# Patient Record
Sex: MALE | Race: WHITE | Employment: PART TIME | URBAN - NONMETROPOLITAN AREA
[De-identification: names, ages, dates, MRNs, and addresses within clinical notes are randomized per-mention and may not be internally consistent; named-entity substitution may affect disease eponyms.]

---

## 2017-06-28 ENCOUNTER — OFFICE VISIT (OUTPATIENT)
Dept: FAMILY MEDICINE CLINIC | Age: 52
End: 2017-06-28

## 2017-06-28 VITALS
BODY MASS INDEX: 37.76 KG/M2 | HEART RATE: 80 BPM | RESPIRATION RATE: 12 BRPM | HEIGHT: 67 IN | WEIGHT: 240.6 LBS | DIASTOLIC BLOOD PRESSURE: 81 MMHG | TEMPERATURE: 98.4 F | SYSTOLIC BLOOD PRESSURE: 127 MMHG

## 2017-06-28 DIAGNOSIS — F31.62 BIPOLAR 1 DISORDER, MIXED, MODERATE (HCC): Primary | ICD-10-CM

## 2017-06-28 DIAGNOSIS — I10 ESSENTIAL HYPERTENSION: ICD-10-CM

## 2017-06-28 DIAGNOSIS — E55.9 VITAMIN D DEFICIENCY: ICD-10-CM

## 2017-06-28 DIAGNOSIS — G89.29 CHRONIC PAIN OF RIGHT KNEE: ICD-10-CM

## 2017-06-28 DIAGNOSIS — M25.561 CHRONIC PAIN OF RIGHT KNEE: ICD-10-CM

## 2017-06-28 PROCEDURE — 99204 OFFICE O/P NEW MOD 45 MIN: CPT | Performed by: FAMILY MEDICINE

## 2017-06-28 RX ORDER — ZOLPIDEM TARTRATE 10 MG/1
TABLET ORAL NIGHTLY PRN
COMMUNITY
End: 2018-08-31 | Stop reason: SDUPTHER

## 2017-06-28 RX ORDER — RISPERIDONE 2 MG/1
2 TABLET, FILM COATED ORAL EVERY EVENING
COMMUNITY
End: 2018-04-04 | Stop reason: SDUPTHER

## 2017-06-28 ASSESSMENT — ENCOUNTER SYMPTOMS
DIARRHEA: 0
VOMITING: 0
NAUSEA: 0
SHORTNESS OF BREATH: 0
BACK PAIN: 1
COUGH: 0
BLOOD IN STOOL: 0
HEARTBURN: 0
DOUBLE VISION: 0
EYE PAIN: 0
EYE REDNESS: 0
HEMOPTYSIS: 0
ORTHOPNEA: 0
EYE DISCHARGE: 0
BLURRED VISION: 0
CONSTIPATION: 0
WHEEZING: 0
SORE THROAT: 0

## 2017-06-28 ASSESSMENT — PATIENT HEALTH QUESTIONNAIRE - PHQ9
2. FEELING DOWN, DEPRESSED OR HOPELESS: 0
1. LITTLE INTEREST OR PLEASURE IN DOING THINGS: 0
SUM OF ALL RESPONSES TO PHQ QUESTIONS 1-9: 0
SUM OF ALL RESPONSES TO PHQ9 QUESTIONS 1 & 2: 0

## 2017-07-19 ENCOUNTER — TELEPHONE (OUTPATIENT)
Dept: FAMILY MEDICINE CLINIC | Age: 52
End: 2017-07-19

## 2017-07-19 DIAGNOSIS — Z12.11 COLON CANCER SCREENING: Primary | ICD-10-CM

## 2017-07-20 ENCOUNTER — TELEPHONE (OUTPATIENT)
Dept: FAMILY MEDICINE CLINIC | Age: 52
End: 2017-07-20

## 2017-08-10 ENCOUNTER — HOSPITAL ENCOUNTER (OUTPATIENT)
Age: 52
Discharge: HOME | End: 2017-08-10
Payer: MEDICARE

## 2017-08-10 ENCOUNTER — TELEPHONE (OUTPATIENT)
Dept: FAMILY MEDICINE CLINIC | Age: 52
End: 2017-08-10

## 2017-08-10 LAB
HCT VFR BLD CALC: 42 % (ref 42–52)
HEMOGLOBIN: 13.9 GM/DL (ref 14–18)
MCH RBC QN AUTO: 30.5 PG (ref 27–31)
MCHC RBC AUTO-ENTMCNC: 33 GM/DL (ref 33–37)
MCV RBC AUTO: 92.7 FL (ref 80–94)
PDW BLD-RTO: 12.9 % (ref 11.5–14.5)
PLATELET # BLD: 199 THOU/MM3 (ref 130–400)
PMV BLD AUTO: 10.3 MCM (ref 7.4–10.4)
RBC # BLD: 4.54 MILL/MM3 (ref 4.7–6.1)
WBC # BLD: 10.3 THOU/MM3 (ref 4.8–10.8)

## 2017-08-10 PROCEDURE — 85027 COMPLETE CBC AUTOMATED: CPT

## 2017-08-10 PROCEDURE — 36415 COLL VENOUS BLD VENIPUNCTURE: CPT

## 2017-08-22 ENCOUNTER — TELEPHONE (OUTPATIENT)
Dept: FAMILY MEDICINE CLINIC | Age: 52
End: 2017-08-22

## 2017-09-27 RX ORDER — LISINOPRIL 20 MG/1
20 TABLET ORAL DAILY
Qty: 30 TABLET | Refills: 5 | Status: SHIPPED | OUTPATIENT
Start: 2017-09-27 | End: 2018-04-04 | Stop reason: SDUPTHER

## 2017-10-31 ENCOUNTER — OFFICE VISIT (OUTPATIENT)
Dept: FAMILY MEDICINE CLINIC | Age: 52
End: 2017-10-31
Payer: MEDICARE

## 2017-10-31 VITALS
HEIGHT: 67 IN | WEIGHT: 251 LBS | RESPIRATION RATE: 14 BRPM | DIASTOLIC BLOOD PRESSURE: 74 MMHG | TEMPERATURE: 97.7 F | BODY MASS INDEX: 39.39 KG/M2 | HEART RATE: 88 BPM | SYSTOLIC BLOOD PRESSURE: 128 MMHG

## 2017-10-31 DIAGNOSIS — I10 ESSENTIAL HYPERTENSION: ICD-10-CM

## 2017-10-31 DIAGNOSIS — G89.29 CHRONIC PAIN OF RIGHT KNEE: ICD-10-CM

## 2017-10-31 DIAGNOSIS — F31.62 BIPOLAR 1 DISORDER, MIXED, MODERATE (HCC): Primary | ICD-10-CM

## 2017-10-31 DIAGNOSIS — M25.561 CHRONIC PAIN OF RIGHT KNEE: ICD-10-CM

## 2017-10-31 PROCEDURE — 99214 OFFICE O/P EST MOD 30 MIN: CPT | Performed by: FAMILY MEDICINE

## 2017-10-31 RX ORDER — ACETAMINOPHEN 325 MG/1
650 TABLET ORAL EVERY 6 HOURS PRN
Status: ON HOLD | COMMUNITY
End: 2021-01-12 | Stop reason: HOSPADM

## 2017-10-31 NOTE — PROGRESS NOTES
Dionne Painter is a 47 y.o. male that presents for Other (referral  to psych ) and Discuss Medications (mobic  only takes sometimes )        HPI:    Would like new referral to psychiatry. Previously saw Dr Felix Homans. Reports that he is compliant with his current regimen. Bipolar Disorder    HPI:  Mood has been doing ok per patient. Increased Distractibility? No  Indiscretion behaviors (gambling, sexual activity)? No  Grandiosity? No  Flight of ideas? No  Increased goal directed activity? No   Decreased need for sleep? No  Talkativeness or pressured speech? Yes     Substance abuse - No   Suicidal/Homicidal Ideation? No    Compliant with meds: Yes  Med side effects: No     Sees therapist?:  Yes    States that the mobic seems to be helping. He is alternating this with ASA and Tylenol. HTN    Does patient check BP regularly at home? - No  Current Medication regimen - Lisinopril  Tolerating medications well? - yes    Shortness of breath or chest pain? No  Headache or visual complaints? No  Neurologic changes like confusion? No  Extremity edema? No    BP Readings from Last 3 Encounters:   10/31/17 128/74   06/28/17 127/81   08/09/15 123/68         Health Maintenance   Topic Date Due    Hepatitis C screen  1965    HIV screen  05/17/1979    DTaP/Tdap/Td vaccine (1 - Tdap) 05/17/1983    Lipid screen  05/17/2004    Diabetes screen  05/17/2004    Flu vaccine (1) 09/01/2017    Colon cancer screen colonoscopy  10/13/2027       Past Medical History:   Diagnosis Date    Arthritis     Bilateral posterior subcapsular polar cataract     Hypertension        Past Surgical History:   Procedure Laterality Date    HERNIA REPAIR      SALIVARY GLAND SURGERY Left 2000    TOE SURGERY         Social History   Substance Use Topics    Smoking status: Former Smoker     Quit date: 6/28/2014    Smokeless tobacco: Never Used    Alcohol use No       History reviewed. No pertinent family history.       I have reviewed prescribed medications. Barriers to medication compliance addressed. Patient given educational materials - see patient instructions. All patient questions answered. Patient voiced understanding.

## 2017-10-31 NOTE — PROGRESS NOTES
Have you changed or started any medications since your last visit including any over-the-counter medicines, vitamins, or herbal medicines? no   Are you having any side effects from any of your medications? -  no  Have you stopped taking any of your medications? Is so, why? -  no    Have you seen any other physician or provider since your last visit? No  Have you had any other diagnostic tests since your last visit? No  Have you been seen in the emergency room and/or had an admission to a hospital since we last saw you? No  Have you had your routine dental cleaning in the past 6 months? no    Have you activated your [employer] account? If not, what are your barriers? No: declined      Patient Care Team:  Nova Joyce, DO as PCP - General (Family Medicine)    Medical History Review  Past Medical, Family, and Social History     Defer to provider.

## 2018-01-16 DIAGNOSIS — F31.62 BIPOLAR 1 DISORDER, MIXED, MODERATE (HCC): Primary | ICD-10-CM

## 2018-01-16 RX ORDER — DIVALPROEX SODIUM 500 MG/1
500 TABLET, EXTENDED RELEASE ORAL 2 TIMES DAILY
Qty: 30 TABLET | OUTPATIENT
Start: 2018-01-16

## 2018-01-16 NOTE — TELEPHONE ENCOUNTER
Pt requesting a refill on Depakote, pt states he only has 7 days of medication left and he doesn't see Dr Nell Borges his new dr until 2/18/18. Pharmacy verified.

## 2018-01-22 RX ORDER — DIVALPROEX SODIUM 500 MG/1
500 TABLET, EXTENDED RELEASE ORAL 2 TIMES DAILY
Qty: 60 TABLET | Refills: 1 | Status: SHIPPED | OUTPATIENT
Start: 2018-01-22 | End: 2018-02-19

## 2018-02-19 ENCOUNTER — OFFICE VISIT (OUTPATIENT)
Dept: PSYCHIATRY | Age: 53
End: 2018-02-19
Payer: MEDICAID

## 2018-02-19 VITALS — WEIGHT: 245 LBS | BODY MASS INDEX: 38.09 KG/M2

## 2018-02-19 DIAGNOSIS — F29 PSYCHOSIS, UNSPECIFIED PSYCHOSIS TYPE (HCC): Primary | ICD-10-CM

## 2018-02-19 PROCEDURE — 90792 PSYCH DIAG EVAL W/MED SRVCS: CPT | Performed by: PSYCHIATRY & NEUROLOGY

## 2018-02-19 RX ORDER — DIVALPROEX SODIUM 500 MG/1
500 TABLET, EXTENDED RELEASE ORAL NIGHTLY
Qty: 30 TABLET | Refills: 3 | Status: SHIPPED | OUTPATIENT
Start: 2018-02-19 | End: 2018-04-04

## 2018-03-09 NOTE — TELEPHONE ENCOUNTER
Jie Vazquez called requesting a refill on the following medications:  Requested Prescriptions     Pending Prescriptions Disp Refills    meloxicam (MOBIC) 7.5 MG tablet 30 tablet      Sig: Take 2 tablets by mouth daily 1/2 to 1 daily as needed     Pharmacy verified:  [facility]      Date of last visit: 10/31/17  Date of next visit (if applicable): 8/28/0133

## 2018-03-10 RX ORDER — MELOXICAM 7.5 MG/1
15 TABLET ORAL DAILY
Qty: 30 TABLET | Refills: 5 | Status: SHIPPED | OUTPATIENT
Start: 2018-03-10 | End: 2018-03-12

## 2018-03-12 ENCOUNTER — OFFICE VISIT (OUTPATIENT)
Dept: FAMILY MEDICINE CLINIC | Age: 53
End: 2018-03-12
Payer: MEDICAID

## 2018-03-12 ENCOUNTER — HOSPITAL ENCOUNTER (OUTPATIENT)
Dept: GENERAL RADIOLOGY | Age: 53
Discharge: HOME | End: 2018-03-12
Payer: MEDICAID

## 2018-03-12 ENCOUNTER — HOSPITAL ENCOUNTER (OUTPATIENT)
Age: 53
Discharge: HOME | End: 2018-03-12
Payer: MEDICAID

## 2018-03-12 VITALS
TEMPERATURE: 98.3 F | BODY MASS INDEX: 38.61 KG/M2 | DIASTOLIC BLOOD PRESSURE: 84 MMHG | HEIGHT: 67 IN | HEART RATE: 84 BPM | WEIGHT: 246 LBS | SYSTOLIC BLOOD PRESSURE: 132 MMHG | RESPIRATION RATE: 12 BRPM

## 2018-03-12 DIAGNOSIS — M25.511 ACUTE PAIN OF RIGHT SHOULDER: ICD-10-CM

## 2018-03-12 DIAGNOSIS — M25.561 CHRONIC PAIN OF RIGHT KNEE: ICD-10-CM

## 2018-03-12 DIAGNOSIS — M25.511 ACUTE PAIN OF RIGHT SHOULDER: Primary | ICD-10-CM

## 2018-03-12 DIAGNOSIS — G89.29 CHRONIC PAIN OF RIGHT KNEE: ICD-10-CM

## 2018-03-12 PROCEDURE — 99214 OFFICE O/P EST MOD 30 MIN: CPT | Performed by: FAMILY MEDICINE

## 2018-03-12 PROCEDURE — 73030 X-RAY EXAM OF SHOULDER: CPT

## 2018-03-12 NOTE — PROGRESS NOTES
Opal Rivers is a 47 y.o. male that presents for Follow-up (R knee pain, pt states that sxs are worse since last visit, sharp pains when walking/weight bearing ) and Shoulder Injury (R shoulder pain x 1 month, injured during fall, constant achey pain with occassional sharpness, pain waking him in the middle of the night )        HPI:    Right Shoulder Pain    HPI:    Symptoms have been present for 1 month(s) in the right shoulder. The pain is constant. The patient describes the pain as aching. Inciting injury or history of trauma? Yes - patient fell backwards on an outstretched arm, pain started immediately  Pain is relieved by - unknown  Pain is aggravated by - laying on that side  Radiation of the pain? No  Paresthesias of the extremities? No  Decreased ROM? No  Treatments tried - mobic    Notes that mobic does not seem to be helping as much for his right knee pain. Health Maintenance   Topic Date Due    Potassium monitoring  1964    Creatinine monitoring  1964    Hepatitis C screen  1965    HIV screen  05/17/1979    DTaP/Tdap/Td vaccine (1 - Tdap) 05/17/1983    Lipid screen  05/17/2004    Diabetes screen  05/17/2004    Shingles Vaccine (1 of 2 - 2 Dose Series) 05/17/2014    Colon cancer screen colonoscopy  10/13/2027    Flu vaccine  Completed       Past Medical History:   Diagnosis Date    Arthritis     Bilateral posterior subcapsular polar cataract     Hypertension        Past Surgical History:   Procedure Laterality Date    HERNIA REPAIR      SALIVARY GLAND SURGERY Left 2000    TOE SURGERY         Social History   Substance Use Topics    Smoking status: Former Smoker     Quit date: 6/28/2014    Smokeless tobacco: Never Used    Alcohol use No       No family history on file.       I have reviewed the patient's past medical history, past surgical history, allergies, medications, social and family history and I have made updates where appropriate. ROS        PHYSICAL EXAM:  /84   Pulse 84   Temp 98.3 °F (36.8 °C) (Oral)   Resp 12   Ht 5' 7.24\" (1.708 m)   Wt 246 lb (111.6 kg)   BMI 38.25 kg/m²     General Appearance: well developed and well- nourished, in no acute distress  Head: normocephalic and atraumatic  ENT: external ear and ear canal normal bilaterally, nose without deformity, nasal mucosa and turbinates normal without polyps, oropharynx normal, dentition is normal for age, no lip or gum lesions noted  Neck: supple and non-tender without mass, no thyromegaly or thyroid nodules, no cervical lymphadenopathy  Pulmonary/Chest: clear to auscultation bilaterally- no wheezes, rales or rhonchi, normal air movement, no respiratory distress or retractions  Cardiovascular: normal rate, regular rhythm, normal S1 and S2, no murmurs, rubs, clicks, or gallops, distal pulses intact  Abdomen: soft, non-tender, non-distended, no rebound or guarding, no masses or hernias noted, no hepatospleenomegaly  Extremities: no cyanosis, clubbing or edema of the lower extremities  Psych:  Normal affect without evidence of depression or anxiety, insight and judgement are appropriate, memory appears intact  Skin: warm and dry, no rash or erythema    5/5 strength in the UEs  ROM is normal bilaterally. Palpitation of the clavicle, AC joint and shoulder joint revealed AC joint tenderness  The patient has a positive standard lift off test.  Neer's test is negative  Empty Can test is negative  Felix Test is negative        ASSESSMENT & PLAN  Estevan Luo was seen today for follow-up and shoulder injury. Diagnoses and all orders for this visit:    Acute pain of right shoulder  -     Ambulatory referral to Occupational Therapy  -     XR SHOULDER RIGHT (MIN 2 VIEWS); Future    Chronic pain of right knee  -     diclofenac (VOLTAREN) 50 MG EC tablet; Take 1 tablet by mouth 3 times daily as needed for Pain    -RC tendonitis vs Labrum injury as cause of shoulder pain. Will start pt. If sx persist, will refer to ortho  -Will stop mobic and try voltaren for knee and shoulder pain.  -Patient advised to call immediately or go to ER if any worsening of symptoms      Return if symptoms worsen or fail to improve. Controlled Substances Monitoring:                     Ashlie Arias received counseling on the following healthy behaviors: medication adherence  Reviewed prior labs and health maintenance. Continue current medications, diet and exercise. Discussed use, benefit, and side effects of prescribed medications. Barriers to medication compliance addressed. Patient given educational materials - see patient instructions. All patient questions answered. Patient voiced understanding.

## 2018-03-13 ENCOUNTER — TELEPHONE (OUTPATIENT)
Dept: FAMILY MEDICINE CLINIC | Age: 53
End: 2018-03-13

## 2018-04-04 ENCOUNTER — OFFICE VISIT (OUTPATIENT)
Dept: PSYCHIATRY | Age: 53
End: 2018-04-04
Payer: MEDICAID

## 2018-04-04 DIAGNOSIS — F29 PSYCHOSIS, UNSPECIFIED PSYCHOSIS TYPE (HCC): Primary | ICD-10-CM

## 2018-04-04 PROCEDURE — G8417 CALC BMI ABV UP PARAM F/U: HCPCS | Performed by: PSYCHIATRY & NEUROLOGY

## 2018-04-04 PROCEDURE — 99213 OFFICE O/P EST LOW 20 MIN: CPT | Performed by: PSYCHIATRY & NEUROLOGY

## 2018-04-04 PROCEDURE — G8428 CUR MEDS NOT DOCUMENT: HCPCS | Performed by: PSYCHIATRY & NEUROLOGY

## 2018-04-04 PROCEDURE — 1036F TOBACCO NON-USER: CPT | Performed by: PSYCHIATRY & NEUROLOGY

## 2018-04-04 PROCEDURE — 3017F COLORECTAL CA SCREEN DOC REV: CPT | Performed by: PSYCHIATRY & NEUROLOGY

## 2018-04-04 RX ORDER — LAMOTRIGINE 25 MG/1
TABLET ORAL
Qty: 42 TABLET | Refills: 0 | Status: SHIPPED | OUTPATIENT
Start: 2018-04-04 | End: 2018-10-25

## 2018-04-04 RX ORDER — RISPERIDONE 2 MG/1
2 TABLET, FILM COATED ORAL NIGHTLY
Qty: 30 TABLET | Refills: 3 | Status: SHIPPED | OUTPATIENT
Start: 2018-04-04 | End: 2018-07-25 | Stop reason: SDUPTHER

## 2018-04-04 RX ORDER — LISINOPRIL 20 MG/1
20 TABLET ORAL DAILY
Qty: 30 TABLET | Refills: 5 | Status: SHIPPED | OUTPATIENT
Start: 2018-04-04 | End: 2018-08-02 | Stop reason: SDUPTHER

## 2018-04-24 ENCOUNTER — TELEPHONE (OUTPATIENT)
Dept: FAMILY MEDICINE CLINIC | Age: 53
End: 2018-04-24

## 2018-05-04 ENCOUNTER — OFFICE VISIT (OUTPATIENT)
Dept: PSYCHIATRY | Age: 53
End: 2018-05-04
Payer: MEDICAID

## 2018-05-04 DIAGNOSIS — F29 PSYCHOSIS, UNSPECIFIED PSYCHOSIS TYPE (HCC): Primary | ICD-10-CM

## 2018-05-04 PROCEDURE — 3017F COLORECTAL CA SCREEN DOC REV: CPT | Performed by: PSYCHIATRY & NEUROLOGY

## 2018-05-04 PROCEDURE — 1036F TOBACCO NON-USER: CPT | Performed by: PSYCHIATRY & NEUROLOGY

## 2018-05-04 PROCEDURE — G8428 CUR MEDS NOT DOCUMENT: HCPCS | Performed by: PSYCHIATRY & NEUROLOGY

## 2018-05-04 PROCEDURE — G8417 CALC BMI ABV UP PARAM F/U: HCPCS | Performed by: PSYCHIATRY & NEUROLOGY

## 2018-05-04 PROCEDURE — 99213 OFFICE O/P EST LOW 20 MIN: CPT | Performed by: PSYCHIATRY & NEUROLOGY

## 2018-05-04 RX ORDER — LAMOTRIGINE 200 MG/1
TABLET ORAL
Qty: 30 TABLET | Refills: 5 | Status: SHIPPED | OUTPATIENT
Start: 2018-05-04 | End: 2018-10-25 | Stop reason: SDUPTHER

## 2018-05-09 ENCOUNTER — TELEPHONE (OUTPATIENT)
Dept: FAMILY MEDICINE CLINIC | Age: 53
End: 2018-05-09

## 2018-05-14 ENCOUNTER — TELEPHONE (OUTPATIENT)
Dept: FAMILY MEDICINE CLINIC | Age: 53
End: 2018-05-14

## 2018-05-14 ENCOUNTER — HOSPITAL ENCOUNTER (OUTPATIENT)
Age: 53
Discharge: HOME | End: 2018-05-14
Payer: MEDICAID

## 2018-05-14 DIAGNOSIS — I10 ESSENTIAL HYPERTENSION: ICD-10-CM

## 2018-05-14 DIAGNOSIS — E55.9 VITAMIN D DEFICIENCY: ICD-10-CM

## 2018-05-14 DIAGNOSIS — E55.9 VITAMIN D DEFICIENCY: Primary | ICD-10-CM

## 2018-05-14 LAB
ANION GAP SERPL CALCULATED.3IONS-SCNC: 15 MEQ/L (ref 8–16)
BUN BLDV-MCNC: 11 MG/DL (ref 7–22)
CALCIUM SERPL-MCNC: 9.6 MG/DL (ref 8.5–10.5)
CHLORIDE BLD-SCNC: 100 MEQ/L (ref 98–111)
CHOLESTEROL, TOTAL: 205 MG/DL (ref 100–199)
CO2: 25 MEQ/L (ref 23–33)
CREAT SERPL-MCNC: 0.7 MG/DL (ref 0.4–1.2)
GFR SERPL CREATININE-BSD FRML MDRD: > 90 ML/MIN/1.73M2
GLUCOSE BLD-MCNC: 100 MG/DL (ref 70–108)
HDLC SERPL-MCNC: 36 MG/DL
LDL CHOLESTEROL CALCULATED: 124 MG/DL
POTASSIUM SERPL-SCNC: 4.5 MEQ/L (ref 3.5–5.2)
SODIUM BLD-SCNC: 140 MEQ/L (ref 135–145)
TRIGL SERPL-MCNC: 227 MG/DL (ref 0–199)
VITAMIN D 25-HYDROXY: 27 NG/ML (ref 30–100)

## 2018-05-14 PROCEDURE — 82306 VITAMIN D 25 HYDROXY: CPT

## 2018-05-14 PROCEDURE — 80048 BASIC METABOLIC PNL TOTAL CA: CPT

## 2018-05-14 PROCEDURE — 36415 COLL VENOUS BLD VENIPUNCTURE: CPT

## 2018-05-14 PROCEDURE — 80061 LIPID PANEL: CPT

## 2018-05-24 ENCOUNTER — TELEPHONE (OUTPATIENT)
Dept: PSYCHIATRY | Age: 53
End: 2018-05-24

## 2018-06-06 ENCOUNTER — OFFICE VISIT (OUTPATIENT)
Dept: PSYCHIATRY | Age: 53
End: 2018-06-06
Payer: MEDICAID

## 2018-06-06 DIAGNOSIS — F29 PSYCHOSIS, UNSPECIFIED PSYCHOSIS TYPE (HCC): Primary | ICD-10-CM

## 2018-06-06 PROCEDURE — G8417 CALC BMI ABV UP PARAM F/U: HCPCS | Performed by: PSYCHIATRY & NEUROLOGY

## 2018-06-06 PROCEDURE — G8428 CUR MEDS NOT DOCUMENT: HCPCS | Performed by: PSYCHIATRY & NEUROLOGY

## 2018-06-06 PROCEDURE — 3017F COLORECTAL CA SCREEN DOC REV: CPT | Performed by: PSYCHIATRY & NEUROLOGY

## 2018-06-06 PROCEDURE — 1036F TOBACCO NON-USER: CPT | Performed by: PSYCHIATRY & NEUROLOGY

## 2018-06-06 PROCEDURE — 99212 OFFICE O/P EST SF 10 MIN: CPT | Performed by: PSYCHIATRY & NEUROLOGY

## 2018-07-17 ENCOUNTER — HOSPITAL ENCOUNTER (OUTPATIENT)
Age: 53
Discharge: HOME | End: 2018-07-17
Payer: MEDICAID

## 2018-07-17 DIAGNOSIS — E55.9 VITAMIN D DEFICIENCY: ICD-10-CM

## 2018-07-17 LAB — VITAMIN D 25-HYDROXY: 58 NG/ML (ref 30–100)

## 2018-07-17 PROCEDURE — 82306 VITAMIN D 25 HYDROXY: CPT

## 2018-07-17 PROCEDURE — 36415 COLL VENOUS BLD VENIPUNCTURE: CPT

## 2018-07-18 ENCOUNTER — TELEPHONE (OUTPATIENT)
Dept: FAMILY MEDICINE CLINIC | Age: 53
End: 2018-07-18

## 2018-07-18 NOTE — TELEPHONE ENCOUNTER
----- Message from Cam Felton DO sent at 7/17/2018  6:55 PM EDT -----  Vitamin D level is good. Recommend he take a supplement with 1200 Units of vitamin D per day.

## 2018-07-25 ENCOUNTER — OFFICE VISIT (OUTPATIENT)
Dept: PSYCHIATRY | Age: 53
End: 2018-07-25
Payer: MEDICAID

## 2018-07-25 DIAGNOSIS — F29 PSYCHOSIS, UNSPECIFIED PSYCHOSIS TYPE (HCC): Primary | ICD-10-CM

## 2018-07-25 PROCEDURE — 99212 OFFICE O/P EST SF 10 MIN: CPT | Performed by: PSYCHIATRY & NEUROLOGY

## 2018-07-25 PROCEDURE — G8428 CUR MEDS NOT DOCUMENT: HCPCS | Performed by: PSYCHIATRY & NEUROLOGY

## 2018-07-25 PROCEDURE — 3017F COLORECTAL CA SCREEN DOC REV: CPT | Performed by: PSYCHIATRY & NEUROLOGY

## 2018-07-25 PROCEDURE — 1036F TOBACCO NON-USER: CPT | Performed by: PSYCHIATRY & NEUROLOGY

## 2018-07-25 PROCEDURE — G8417 CALC BMI ABV UP PARAM F/U: HCPCS | Performed by: PSYCHIATRY & NEUROLOGY

## 2018-07-25 RX ORDER — RISPERIDONE 2 MG/1
2 TABLET, FILM COATED ORAL NIGHTLY
Qty: 30 TABLET | Refills: 5 | Status: SHIPPED | OUTPATIENT
Start: 2018-07-25 | End: 2018-10-25 | Stop reason: SDUPTHER

## 2018-07-25 NOTE — PROGRESS NOTES
Chief Complaint   Patient presents with    Follow-up     6 wks psychosis r/o bipolar     And returned after six weeks to follow-up on psychosis, rule out bipolar disorder. Once again, he denies any and all mood symptoms area did his mother was bipolar and he has had some distortions like paranoia and hallucinations, but he really has not ever and mood symptoms that I can find. That leaves me in a diagnostic quandary. I think I'm going to have to continue to diagnose an unspecified psychosis which is in remission. He did need a refill of the risperidone 2 mg. He takes one at bedtime daily. This was provided. He still has ample refills of lamotrigine. He was personable, has talked some about his experiences at work, and really is not the a functional difficulties that I can tell. Mental Status Examination    Level of consciousness:  within normal limits  Appearance:  well-appearing, street clothes, in chair, good grooming and good hygiene  Behavior/Motor:  no abnormalities noted  Attitude toward examiner:  cooperative, attentive and good eye contact  Speech:  spontaneous, normal rate, normal volume and well articulated  Mood:  euthymic  Affect:  mood congruent  Thought processes:  linear, goal directed and coherent  Thought content:  Homocidal ideation: none  Suicidal Ideation:  denies suicidal ideation  Delusions:  no evidence of delusions  Perceptual Disturbance:  denies any perceptual disturbance  Cognition:  oriented to person, place, and time  Concentration succeeded  Memory intact  Fund of knowledge:  good  Abstract thinking:  fair  Insight:  good  Judgment:  good    DIAGNOSTIC IMPRESSION  Pschosis unspecified, in remission    Plan  No changes  Current Outpatient Prescriptions   Medication Sig Dispense Refill    risperiDONE (RISPERDAL) 2 MG tablet Take 1 tablet by mouth nightly 30 tablet 5    Cholecalciferol (VITAMIN D3) 41672 units TABS Take 1 tab PO q weekly for 8 weeks.  8 tablet 0    lamoTRIgine (LAMICTAL) 200 MG tablet After completing the 25s, take 1/2 tablet daily for one week, then increase to the whole tablet daily and maintain that level 30 tablet 5    lisinopril (PRINIVIL;ZESTRIL) 20 MG tablet Take 1 tablet by mouth daily 30 tablet 5    lamoTRIgine (LAMICTAL) 25 MG tablet After two weeks off the valproate, take 1 tablet daily for two weeks, then increase to 2 tablets daily 42 tablet 0    diclofenac (VOLTAREN) 50 MG EC tablet Take 1 tablet by mouth 3 times daily as needed for Pain 60 tablet 3    acetaminophen (TYLENOL) 325 MG tablet Take 650 mg by mouth every 6 hours as needed for Pain      zolpidem (AMBIEN) 10 MG tablet Take by mouth nightly as needed for Sleep      aspirin 81 MG tablet Take 81 mg by mouth daily       No current facility-administered medications for this visit.

## 2018-08-02 ENCOUNTER — OFFICE VISIT (OUTPATIENT)
Dept: FAMILY MEDICINE CLINIC | Age: 53
End: 2018-08-02
Payer: MEDICAID

## 2018-08-02 VITALS
WEIGHT: 246 LBS | HEIGHT: 67 IN | DIASTOLIC BLOOD PRESSURE: 82 MMHG | RESPIRATION RATE: 14 BRPM | BODY MASS INDEX: 38.61 KG/M2 | HEART RATE: 88 BPM | SYSTOLIC BLOOD PRESSURE: 118 MMHG | TEMPERATURE: 99.1 F

## 2018-08-02 DIAGNOSIS — I10 ESSENTIAL HYPERTENSION: Primary | ICD-10-CM

## 2018-08-02 DIAGNOSIS — F39 MOOD DISORDER (HCC): ICD-10-CM

## 2018-08-02 DIAGNOSIS — Z11.59 NEED FOR HEPATITIS C SCREENING TEST: ICD-10-CM

## 2018-08-02 DIAGNOSIS — G89.29 CHRONIC PAIN OF RIGHT KNEE: ICD-10-CM

## 2018-08-02 DIAGNOSIS — M25.561 CHRONIC PAIN OF RIGHT KNEE: ICD-10-CM

## 2018-08-02 PROCEDURE — 3017F COLORECTAL CA SCREEN DOC REV: CPT | Performed by: FAMILY MEDICINE

## 2018-08-02 PROCEDURE — G8427 DOCREV CUR MEDS BY ELIG CLIN: HCPCS | Performed by: FAMILY MEDICINE

## 2018-08-02 PROCEDURE — 99214 OFFICE O/P EST MOD 30 MIN: CPT | Performed by: FAMILY MEDICINE

## 2018-08-02 PROCEDURE — 1036F TOBACCO NON-USER: CPT | Performed by: FAMILY MEDICINE

## 2018-08-02 PROCEDURE — G8417 CALC BMI ABV UP PARAM F/U: HCPCS | Performed by: FAMILY MEDICINE

## 2018-08-02 RX ORDER — LISINOPRIL 20 MG/1
20 TABLET ORAL DAILY
Qty: 90 TABLET | Refills: 3 | Status: SHIPPED | OUTPATIENT
Start: 2018-08-02 | End: 2019-07-22 | Stop reason: SDUPTHER

## 2018-08-02 NOTE — PROGRESS NOTES
Visit Information    Have you changed or started any medications since your last visit including any over-the-counter medicines, vitamins, or herbal medicines? no   Are you having any side effects from any of your medications? -  no  Have you stopped taking any of your medications? Is so, why? -  no    Have you seen any other physician or provider since your last visit? Yes - Records Obtained  Have you had any other diagnostic tests since your last visit? Yes - Records Obtained  Have you been seen in the emergency room and/or had an admission to a hospital since we last saw you? No  Have you had your routine dental cleaning in the past 6 months? no    Have you activated your [employer] account? If not, what are your barriers?  No: pt declined     Patient Care Team:  Kathleen Fong DO as PCP - General (Family Medicine)    Medical History Review  Past Medical, Family, and Social History reviewed and does contribute to the patient presenting condition    Health Maintenance   Topic Date Due    Hepatitis C screen  1965    HIV screen  05/17/1979    DTaP/Tdap/Td vaccine (1 - Tdap) 05/17/1983    Shingles Vaccine (1 of 2 - 2 Dose Series) 05/17/2014    Flu vaccine (1) 09/01/2018    Potassium monitoring  05/14/2019    Creatinine monitoring  05/14/2019    Lipid screen  05/14/2023    Colon cancer screen colonoscopy  10/13/2027

## 2018-08-31 ENCOUNTER — TELEPHONE (OUTPATIENT)
Dept: PSYCHIATRY | Age: 53
End: 2018-08-31

## 2018-08-31 DIAGNOSIS — F29 PSYCHOSIS, UNSPECIFIED PSYCHOSIS TYPE (HCC): Primary | ICD-10-CM

## 2018-08-31 RX ORDER — ZOLPIDEM TARTRATE 10 MG/1
10 TABLET ORAL NIGHTLY PRN
Qty: 30 TABLET | Refills: 0 | Status: SHIPPED | OUTPATIENT
Start: 2018-08-31 | End: 2018-09-30

## 2018-08-31 NOTE — TELEPHONE ENCOUNTER
Patient called asking if you start to manage his medication for sleep that Dr Alvarez Neither previously prescribed. Patient states he sometimes has trouble with sleep and last night took Ambien 10 mg however that was his last tablet from an old prescription he had from Dr Alvarez Neither.  If agreeable I will load medication for your approval. Patient was last seen 7/25/18 and scheduled to return 10/25/18

## 2018-09-11 ENCOUNTER — HOSPITAL ENCOUNTER (OUTPATIENT)
Age: 53
Discharge: HOME | End: 2018-09-11
Payer: MEDICAID

## 2018-09-11 DIAGNOSIS — Z11.59 NEED FOR HEPATITIS C SCREENING TEST: ICD-10-CM

## 2018-09-11 DIAGNOSIS — I10 ESSENTIAL HYPERTENSION: ICD-10-CM

## 2018-09-11 LAB
ANION GAP SERPL CALCULATED.3IONS-SCNC: 15 MEQ/L (ref 8–16)
BUN BLDV-MCNC: 11 MG/DL (ref 7–22)
CALCIUM SERPL-MCNC: 10.1 MG/DL (ref 8.5–10.5)
CHLORIDE BLD-SCNC: 98 MEQ/L (ref 98–111)
CO2: 25 MEQ/L (ref 23–33)
CREAT SERPL-MCNC: 0.8 MG/DL (ref 0.4–1.2)
GFR SERPL CREATININE-BSD FRML MDRD: > 90 ML/MIN/1.73M2
GLUCOSE BLD-MCNC: 82 MG/DL (ref 70–108)
HEPATITIS C ANTIBODY: NEGATIVE
POTASSIUM SERPL-SCNC: 4.5 MEQ/L (ref 3.5–5.2)
SODIUM BLD-SCNC: 138 MEQ/L (ref 135–145)

## 2018-09-11 PROCEDURE — 80048 BASIC METABOLIC PNL TOTAL CA: CPT

## 2018-09-11 PROCEDURE — 36415 COLL VENOUS BLD VENIPUNCTURE: CPT

## 2018-09-11 PROCEDURE — 86803 HEPATITIS C AB TEST: CPT

## 2018-09-12 ENCOUNTER — TELEPHONE (OUTPATIENT)
Dept: FAMILY MEDICINE CLINIC | Age: 53
End: 2018-09-12

## 2018-09-12 NOTE — TELEPHONE ENCOUNTER
----- Message from Mata Fuller DO sent at 9/11/2018  7:17 PM EDT -----  Labs look good, hep C and bmp were normal, continue current meds.

## 2018-10-25 ENCOUNTER — OFFICE VISIT (OUTPATIENT)
Dept: PSYCHIATRY | Age: 53
End: 2018-10-25
Payer: MEDICAID

## 2018-10-25 DIAGNOSIS — F29 PSYCHOSIS, UNSPECIFIED PSYCHOSIS TYPE (HCC): Primary | ICD-10-CM

## 2018-10-25 PROCEDURE — 3017F COLORECTAL CA SCREEN DOC REV: CPT | Performed by: PSYCHIATRY & NEUROLOGY

## 2018-10-25 PROCEDURE — 1036F TOBACCO NON-USER: CPT | Performed by: PSYCHIATRY & NEUROLOGY

## 2018-10-25 PROCEDURE — 99212 OFFICE O/P EST SF 10 MIN: CPT | Performed by: PSYCHIATRY & NEUROLOGY

## 2018-10-25 PROCEDURE — G8484 FLU IMMUNIZE NO ADMIN: HCPCS | Performed by: PSYCHIATRY & NEUROLOGY

## 2018-10-25 PROCEDURE — G8417 CALC BMI ABV UP PARAM F/U: HCPCS | Performed by: PSYCHIATRY & NEUROLOGY

## 2018-10-25 PROCEDURE — G8428 CUR MEDS NOT DOCUMENT: HCPCS | Performed by: PSYCHIATRY & NEUROLOGY

## 2018-10-25 RX ORDER — LAMOTRIGINE 200 MG/1
TABLET ORAL
Qty: 90 TABLET | Refills: 3 | Status: SHIPPED | OUTPATIENT
Start: 2018-10-25 | End: 2018-10-25 | Stop reason: DRUGHIGH

## 2018-10-25 RX ORDER — LAMOTRIGINE 200 MG/1
TABLET ORAL
Qty: 90 TABLET | Refills: 3 | Status: SHIPPED | OUTPATIENT
Start: 2018-10-25 | End: 2020-03-26

## 2018-10-25 RX ORDER — RISPERIDONE 2 MG/1
2 TABLET, FILM COATED ORAL NIGHTLY
Qty: 90 TABLET | Refills: 3 | Status: SHIPPED | OUTPATIENT
Start: 2018-10-25 | End: 2019-08-05 | Stop reason: ALTCHOICE

## 2018-10-25 NOTE — PROGRESS NOTES
(PRINIVIL;ZESTRIL) 20 MG tablet Take 1 tablet by mouth daily 90 tablet 3    Cholecalciferol (VITAMIN D3) 92141 units TABS Take 1 tab PO q weekly for 8 weeks. 8 tablet 0    acetaminophen (TYLENOL) 325 MG tablet Take 650 mg by mouth every 6 hours as needed for Pain      aspirin 81 MG tablet Take 81 mg by mouth daily       No current facility-administered medications for this visit.

## 2018-11-08 ENCOUNTER — TELEPHONE (OUTPATIENT)
Dept: PSYCHIATRY | Age: 53
End: 2018-11-08

## 2018-11-09 NOTE — TELEPHONE ENCOUNTER
Spoke with Urban Agarwal; he states that the rash is much better today but after thinking about it more he states that he could have gotten the rash from his 2nd vaccine from shingles. He states that his room mate had gotten an elevated temp. from it also. I informed him to monitor it and just keep us updated with any changes that might occur.

## 2019-01-31 ENCOUNTER — OFFICE VISIT (OUTPATIENT)
Dept: PSYCHIATRY | Age: 54
End: 2019-01-31
Payer: MEDICAID

## 2019-01-31 DIAGNOSIS — F29 PSYCHOSIS, UNSPECIFIED PSYCHOSIS TYPE (HCC): Primary | ICD-10-CM

## 2019-01-31 PROCEDURE — G8428 CUR MEDS NOT DOCUMENT: HCPCS | Performed by: PSYCHIATRY & NEUROLOGY

## 2019-01-31 PROCEDURE — 99212 OFFICE O/P EST SF 10 MIN: CPT | Performed by: PSYCHIATRY & NEUROLOGY

## 2019-01-31 PROCEDURE — G8417 CALC BMI ABV UP PARAM F/U: HCPCS | Performed by: PSYCHIATRY & NEUROLOGY

## 2019-01-31 PROCEDURE — 1036F TOBACCO NON-USER: CPT | Performed by: PSYCHIATRY & NEUROLOGY

## 2019-01-31 PROCEDURE — 3017F COLORECTAL CA SCREEN DOC REV: CPT | Performed by: PSYCHIATRY & NEUROLOGY

## 2019-01-31 PROCEDURE — G8484 FLU IMMUNIZE NO ADMIN: HCPCS | Performed by: PSYCHIATRY & NEUROLOGY

## 2019-04-03 ENCOUNTER — OFFICE VISIT (OUTPATIENT)
Dept: FAMILY MEDICINE CLINIC | Age: 54
End: 2019-04-03
Payer: MEDICAID

## 2019-04-03 VITALS
WEIGHT: 246 LBS | DIASTOLIC BLOOD PRESSURE: 76 MMHG | SYSTOLIC BLOOD PRESSURE: 122 MMHG | HEART RATE: 80 BPM | RESPIRATION RATE: 14 BRPM | BODY MASS INDEX: 37.28 KG/M2 | TEMPERATURE: 98.8 F | HEIGHT: 68 IN

## 2019-04-03 DIAGNOSIS — G89.29 CHRONIC PAIN OF RIGHT KNEE: ICD-10-CM

## 2019-04-03 DIAGNOSIS — B35.6 TINEA OF GROIN: Primary | ICD-10-CM

## 2019-04-03 DIAGNOSIS — M25.561 CHRONIC PAIN OF RIGHT KNEE: ICD-10-CM

## 2019-04-03 PROCEDURE — 3017F COLORECTAL CA SCREEN DOC REV: CPT | Performed by: FAMILY MEDICINE

## 2019-04-03 PROCEDURE — G8417 CALC BMI ABV UP PARAM F/U: HCPCS | Performed by: FAMILY MEDICINE

## 2019-04-03 PROCEDURE — G8427 DOCREV CUR MEDS BY ELIG CLIN: HCPCS | Performed by: FAMILY MEDICINE

## 2019-04-03 PROCEDURE — 99213 OFFICE O/P EST LOW 20 MIN: CPT | Performed by: FAMILY MEDICINE

## 2019-04-03 PROCEDURE — 1036F TOBACCO NON-USER: CPT | Performed by: FAMILY MEDICINE

## 2019-04-03 RX ORDER — TERBINAFINE HYDROCHLORIDE 250 MG/1
250 TABLET ORAL DAILY
Qty: 7 TABLET | Refills: 0 | Status: SHIPPED | OUTPATIENT
Start: 2019-04-03 | End: 2019-04-10

## 2019-04-03 NOTE — PROGRESS NOTES
Eliane Singh is a 46 y.o. male that presents for     Chief Complaint   Patient presents with    Other     jock itch  for last 2 weeks  OTC not working     Discuss Medications     Diclofenec and  B/P meds        /76   Pulse 80   Temp 98.8 °F (37.1 °C) (Oral)   Resp 14   Ht 5' 8\" (1.727 m)   Wt 246 lb (111.6 kg)   BMI 37.40 kg/m²       HPI:    Rash    HPI:    Length of time Sx have been present - 2 weeks  Rash has gotten worse since initially starting  Affected areas - right thigh/scrotum  Inciting events or exposures prior to rash starting? Yes - he is walking more recently  Pruritic? Yes  Erythematous? Yes  Weeping or drainage? No  History of Urticaria? No  Fever? No  Painful? Yes - 'light burning'    Review of Systems - General ROS: negative for - chills, fever or night sweats  Respiratory ROS: negative for - shortness of breath, stridor or wheezing      Health Maintenance   Topic Date Due    HIV screen  1979    DTaP/Tdap/Td vaccine (1 - Tdap) 1983    Shingles Vaccine (2 of 2) 10/20/2018    Potassium monitoring  2019    Creatinine monitoring  2019    Lipid screen  2023    Colon cancer screen colonoscopy  10/13/2027    Flu vaccine  Completed    Hepatitis C screen  Completed       Past Medical History:   Diagnosis Date    Arthritis     Bilateral posterior subcapsular polar cataract     Hypertension        Past Surgical History:   Procedure Laterality Date    HERNIA REPAIR      SALIVARY GLAND SURGERY Left 2000    TOE SURGERY         Social History     Tobacco Use    Smoking status: Former Smoker     Last attempt to quit: 2014     Years since quittin.7    Smokeless tobacco: Never Used   Substance Use Topics    Alcohol use: No    Drug use: No       No family history on file.       I have reviewed the patient's past medical history, past surgical history, allergies, medications, social and family history and I have made updates where appropriate. Review of Systems        PHYSICAL EXAM:  /76   Pulse 80   Temp 98.8 °F (37.1 °C) (Oral)   Resp 14   Ht 5' 8\" (1.727 m)   Wt 246 lb (111.6 kg)   BMI 37.40 kg/m²     General Appearance: well developed and well- nourished, in no acute distress  Head: normocephalic and atraumatic  Extremities: no cyanosis, clubbing or edema of the lower extremities  Psych:  Normal affect without evidence of depression oranxiety, insight and judgement are appropriate, memory appears intact  Skin: warm and dry, erythematous patch of the right lateral scrotum and thigh      ASSESSMENT & PLAN  Alfonso Yuan was seen today for other and discuss medications. Diagnoses and all orders for this visit:    Tinea of groin  -     terbinafine (LAMISIL) 250 MG tablet; Take 1 tablet by mouth daily for 7 days  -     miconazole (ZEASORB-AF) 2 % powder; Apply topically 2 times daily. Chronic pain of right knee  Roselyn Ivy MD, Orthopedic Surgery, [facility] ROXANNE AKHTAR    -Patient advised to call if any worsening of symptoms      Return if symptoms worsen or fail to improve. Controlled Substances Monitoring:                     Alfonso Yuan received counseling on the following healthy behaviors: medication adherence  Reviewed prior labs and health maintenance. Continue current medications, diet and exercise. Discussed use, benefit, and side effects of prescribed medications. Barriers to medication compliance addressed. Patient given educational materials - see patient instructions. All patient questions answered. Patient voiced understanding.

## 2019-04-03 NOTE — PROGRESS NOTES
Have you changed or started any medications since your last visit including any over-the-counter medicines, vitamins, or herbal medicines? no   Are you having any side effects from any of your medications? -  no  Have you stopped taking any of your medications? Is so, why? -  no    Have you seen any other physician or provider since your last visit? No  Have you had any other diagnostic tests since your last visit? No  Have you been seen in the emergency room and/or had an admission to a hospital since we last saw you? No  Have you had your routine dental cleaning in the past 6 months? no    Have you activated your [employer] account? If not, what are your barriers? No:      Patient Care Team:  Carlos Lind DO as PCP - General (Family Medicine)    Medical History Review  Past Medical, Family, and Social History     Defer to provider.

## 2019-04-26 ENCOUNTER — TELEPHONE (OUTPATIENT)
Dept: FAMILY MEDICINE CLINIC | Age: 54
End: 2019-04-26

## 2019-04-26 DIAGNOSIS — G89.29 CHRONIC PAIN OF RIGHT KNEE: Primary | ICD-10-CM

## 2019-04-26 DIAGNOSIS — M67.80 TENDONOSIS: ICD-10-CM

## 2019-04-26 DIAGNOSIS — M25.561 CHRONIC PAIN OF RIGHT KNEE: Primary | ICD-10-CM

## 2019-04-26 NOTE — TELEPHONE ENCOUNTER
AMB EXTERNAL REFERRAL TO ORTHOPEDIC SURGERY-Left a detailed message letting patient know that we need to hear from him as to the provider name and number of where this referral is to be placed. Reminded him that he informed us [facility] is not in his insurance network, and he was going to check with his insurance. We are almost to the 30 day end of the referral.  If he wants to cancel the referral, we will need to know this as well.

## 2019-04-29 NOTE — TELEPHONE ENCOUNTER
Insurance still hasn't sent information so Rowdy Lewis went on line. Pauline in [facility]. Called and did verify that they accept his insurance. They need referral and MRI from 2017 faxed. Dr Rome Crawford will need to sign MRI before faxing.     Fax # - [phone]  # - [phone]

## 2019-04-29 NOTE — TELEPHONE ENCOUNTER
What is the referral for? I see he had an MRI of his knee in 2017, is it for this, he had recent shoulder x-rays?  Thanks

## 2019-04-30 ENCOUNTER — OFFICE VISIT (OUTPATIENT)
Dept: PSYCHIATRY | Age: 54
End: 2019-04-30
Payer: MEDICAID

## 2019-04-30 DIAGNOSIS — F29 PSYCHOSIS, UNSPECIFIED PSYCHOSIS TYPE (HCC): Primary | ICD-10-CM

## 2019-04-30 PROCEDURE — 3017F COLORECTAL CA SCREEN DOC REV: CPT | Performed by: PSYCHIATRY & NEUROLOGY

## 2019-04-30 PROCEDURE — 99213 OFFICE O/P EST LOW 20 MIN: CPT | Performed by: PSYCHIATRY & NEUROLOGY

## 2019-04-30 PROCEDURE — G8417 CALC BMI ABV UP PARAM F/U: HCPCS | Performed by: PSYCHIATRY & NEUROLOGY

## 2019-04-30 PROCEDURE — 1036F TOBACCO NON-USER: CPT | Performed by: PSYCHIATRY & NEUROLOGY

## 2019-04-30 PROCEDURE — G8428 CUR MEDS NOT DOCUMENT: HCPCS | Performed by: PSYCHIATRY & NEUROLOGY

## 2019-04-30 NOTE — PROGRESS NOTES
good    DIAGNOSTIC IMPRESSION  Psychotic disorder unspecified    Plan  Reduce risperidone to 1 mg nightly (1/2 tablet)  Current Outpatient Medications   Medication Sig Dispense Refill    miconazole (ZEASORB-AF) 2 % powder Apply topically 2 times daily. 45 g 1    risperiDONE (RISPERDAL) 2 MG tablet Take 1 tablet by mouth nightly 90 tablet 3    lamoTRIgine (LAMICTAL) 200 MG tablet Take 1 tablet by mouth daily 90 tablet 3    diclofenac (VOLTAREN) 50 MG EC tablet Take 1 tablet by mouth 3 times daily as needed for Pain 180 tablet 3    lisinopril (PRINIVIL;ZESTRIL) 20 MG tablet Take 1 tablet by mouth daily 90 tablet 3    Cholecalciferol (VITAMIN D3) 55144 units TABS Take 1 tab PO q weekly for 8 weeks. 8 tablet 0    acetaminophen (TYLENOL) 325 MG tablet Take 650 mg by mouth every 6 hours as needed for Pain      aspirin 81 MG tablet Take 81 mg by mouth daily       No current facility-administered medications for this visit.

## 2019-08-05 ENCOUNTER — OFFICE VISIT (OUTPATIENT)
Dept: FAMILY MEDICINE CLINIC | Age: 54
End: 2019-08-05
Payer: MEDICAID

## 2019-08-05 VITALS
HEART RATE: 80 BPM | TEMPERATURE: 98.6 F | WEIGHT: 250 LBS | HEIGHT: 68 IN | RESPIRATION RATE: 14 BRPM | DIASTOLIC BLOOD PRESSURE: 64 MMHG | SYSTOLIC BLOOD PRESSURE: 118 MMHG | BODY MASS INDEX: 37.89 KG/M2

## 2019-08-05 DIAGNOSIS — Z11.4 SCREENING FOR HIV (HUMAN IMMUNODEFICIENCY VIRUS): ICD-10-CM

## 2019-08-05 DIAGNOSIS — I10 ESSENTIAL HYPERTENSION: ICD-10-CM

## 2019-08-05 DIAGNOSIS — M25.561 CHRONIC PAIN OF RIGHT KNEE: ICD-10-CM

## 2019-08-05 DIAGNOSIS — H93.13 TINNITUS OF BOTH EARS: Primary | ICD-10-CM

## 2019-08-05 DIAGNOSIS — G89.29 CHRONIC PAIN OF RIGHT KNEE: ICD-10-CM

## 2019-08-05 PROCEDURE — G8417 CALC BMI ABV UP PARAM F/U: HCPCS | Performed by: FAMILY MEDICINE

## 2019-08-05 PROCEDURE — 99214 OFFICE O/P EST MOD 30 MIN: CPT | Performed by: FAMILY MEDICINE

## 2019-08-05 PROCEDURE — 3017F COLORECTAL CA SCREEN DOC REV: CPT | Performed by: FAMILY MEDICINE

## 2019-08-05 PROCEDURE — G8427 DOCREV CUR MEDS BY ELIG CLIN: HCPCS | Performed by: FAMILY MEDICINE

## 2019-08-05 PROCEDURE — 1036F TOBACCO NON-USER: CPT | Performed by: FAMILY MEDICINE

## 2019-08-05 RX ORDER — RISPERIDONE 1 MG/1
1 TABLET, FILM COATED ORAL NIGHTLY
COMMUNITY
End: 2019-11-25 | Stop reason: SDUPTHER

## 2019-08-07 LAB
ANION GAP SERPL CALCULATED.3IONS-SCNC: 11 MMOL/L (ref 4–12)
BUN BLDV-MCNC: 15 MG/DL (ref 5–23)
CALCIUM SERPL-MCNC: 9.5 MG/DL (ref 8.5–10.5)
CHLORIDE BLD-SCNC: 100 MMOL/L (ref 98–109)
CO2: 27 MMOL/L (ref 22–32)
CREAT SERPL-MCNC: 0.86 MG/DL (ref 0.6–1.3)
EGFR AFRICAN AMERICAN: >60 ML/MIN/1.73SQ.M
EGFR IF NONAFRICAN AMERICAN: >60 ML/MIN/1.73SQ.M
GLUCOSE: 91 MG/DL (ref 65–99)
HIV 1,2 COMBO ANTIGEN/ANTIBODY: NORMAL
POTASSIUM SERPL-SCNC: 4.4 MMOL/L (ref 3.5–5)
SODIUM BLD-SCNC: 138 MMOL/L (ref 134–146)

## 2019-08-08 ENCOUNTER — TELEPHONE (OUTPATIENT)
Dept: FAMILY MEDICINE CLINIC | Age: 54
End: 2019-08-08

## 2019-08-15 ENCOUNTER — OFFICE VISIT (OUTPATIENT)
Dept: PSYCHIATRY | Age: 54
End: 2019-08-15
Payer: MEDICAID

## 2019-08-15 DIAGNOSIS — F23 BRIEF PSYCHOTIC DISORDER (HCC): Primary | ICD-10-CM

## 2019-08-15 PROCEDURE — G8417 CALC BMI ABV UP PARAM F/U: HCPCS | Performed by: PSYCHIATRY & NEUROLOGY

## 2019-08-15 PROCEDURE — 3017F COLORECTAL CA SCREEN DOC REV: CPT | Performed by: PSYCHIATRY & NEUROLOGY

## 2019-08-15 PROCEDURE — G8428 CUR MEDS NOT DOCUMENT: HCPCS | Performed by: PSYCHIATRY & NEUROLOGY

## 2019-08-15 PROCEDURE — 99213 OFFICE O/P EST LOW 20 MIN: CPT | Performed by: PSYCHIATRY & NEUROLOGY

## 2019-08-15 PROCEDURE — 1036F TOBACCO NON-USER: CPT | Performed by: PSYCHIATRY & NEUROLOGY

## 2019-08-15 NOTE — PROGRESS NOTES
Chief Complaint   Patient presents with    Follow-up     4 mos Psychosis     Fermin Mattson returned after 4 months to follow-up on psychotic disorder unspecified. Last time we reduced his risperidone to 1/2 tablet or 1 mg nightly. He says he is doing quite well on this. His extraneous movements have subsided. He is not reporting any hallucinations or delusions. He is comfortable on the current dose and does not want to change it, so we will not make any changes today. In the future I may wish to lower it more. He has a new symptom of tinnitus. He will be seeing ENT. He asked me questions about what could cause that I had to look on up-to-date. There is a whole list of medicines that might cause it most, amongst which were ACE inhibitors. He is on lisinopril. He will ask the ENT about it because I told him I simply was not knowledgeable in that area. He told me he had not used any Ambien or Tylenol PM since I last saw him 4 months ago. He is reporting improvement in his right knee. He is nearly bone-on-bone in that joint and had a steroid injection several months ago. That has been quite helpful to him. He was also off work for 3 months but has since gone back to work. 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 I made no changes today. He still has a large supply of the 2 mg risperidone which he is cutting in half, so he has nearly 180 days of that. I will wait to order anything else until that runs down.     Mental Status Examination    Level of consciousness:  within normal limits  Appearance:  well-appearing, street clothes, in chair, good grooming and good hygiene  Behavior/Motor:  no abnormalities noted  Attitude toward examiner:  cooperative, attentive and good eye contact  Speech:  spontaneous, normal rate, normal volume and well articulated  Mood:  euthymic  Affect:  mood congruent  Thought processes:  linear, goal directed and coherent  Thought content:  Homocidal ideation: none  Suicidal

## 2019-08-22 ENCOUNTER — HOSPITAL ENCOUNTER (OUTPATIENT)
Dept: AUDIOLOGY | Age: 54
Discharge: HOME | End: 2019-08-22
Payer: MEDICAID

## 2019-08-22 PROCEDURE — 92567 TYMPANOMETRY: CPT | Performed by: AUDIOLOGIST

## 2019-08-22 PROCEDURE — 92557 COMPREHENSIVE HEARING TEST: CPT | Performed by: AUDIOLOGIST

## 2019-08-22 NOTE — PROGRESS NOTES
ACCOUNT #: [de-identified]                                                AUDIOLOGICAL EVALUATION    REASON FOR TESTING:  The patient noted sudden onset bilateral buzzing tinnitus about 3 weeks ago. It was very severe for 1 day and has since improved but not entirely subsided. He denies hearing loss, otalgia, and dizziness. OTOSCOPY: WNL for both ears. AUDIOGRAM      Reliability: Good  Audiometer Used:  GSI-61    PURE TONES     RE    LE     [x]   [x] WNL        []   [] Mild    []   [] Moderate       []   [] Mod-Severe   []   [] Severe    []   [] Profound    TYPE     RE    LE    []   [] SNHL    []   []  Conductive HL    []   [] Mixed HL      CONFIGURATION    RE    LE    []   [] Essentially Flat     []   []  Sloping  []   [] Steeply Sloping  []   []  Rising  []   [] Cookie Bite    SPEECH AUDIOMETRY   Right Left Sound Field Aided   PTA 11 dB 11 dB     SRT 5 dB 5 dB     SAT       MASKING       % WRS   QUIET 96% 88%      40 dBSL 40 dBSL     %WRS   NOISE              MCL       UCL            Live Voice  [x]     Recorded  []     List   []     WORD RECOGNITION   RE    LE  [x]   [x]  Excellent    []   []  Good  []   [] Fair  []   [] Poor  []   [] Very Poor    TYMPANOGRAMS  RE    LE  [x]   [x]  Normal compliance    []   []  Reduced mobility  []   [] Hyper mobility  [x]   [x] Normal middle ear pressure  []   [] Negative middle ear pressure  []   [] Positive middle ear pressure  []   [] Flat w/normal ECV  []   [] Flat w/large ECV  []   [] Patent PE tube  []   [] Non-Patent PE tube  []   [] Could Not Test    DISTORTION PRODUCT OTOACOUSTIC EMISSIONS SCREENING    Right Ear     [x] Passed     [] Refer     [] Did Not Test  Left Ear        [x] Passed     [] Refer     [x] Did Not Test      COMMENTS: Normal hearing sensitivity, normal cochlear function and normal middle ear function for both ears. RECOMMENDATION(S):   1- A referral to an ENT provider could be considered due to the sudden onset tinnitus.   2- Repeat audiogram

## 2019-11-25 RX ORDER — RISPERIDONE 1 MG/1
1 TABLET, FILM COATED ORAL NIGHTLY
Qty: 30 TABLET | Refills: 0 | Status: SHIPPED | OUTPATIENT
Start: 2019-11-25 | End: 2019-12-16 | Stop reason: SDUPTHER

## 2019-12-16 ENCOUNTER — OFFICE VISIT (OUTPATIENT)
Dept: PSYCHIATRY | Age: 54
End: 2019-12-16
Payer: MEDICAID

## 2019-12-16 DIAGNOSIS — F23 BRIEF PSYCHOTIC DISORDER (HCC): Primary | ICD-10-CM

## 2019-12-16 PROCEDURE — 99213 OFFICE O/P EST LOW 20 MIN: CPT | Performed by: PSYCHIATRY & NEUROLOGY

## 2019-12-16 PROCEDURE — 1036F TOBACCO NON-USER: CPT | Performed by: PSYCHIATRY & NEUROLOGY

## 2019-12-16 PROCEDURE — G8417 CALC BMI ABV UP PARAM F/U: HCPCS | Performed by: PSYCHIATRY & NEUROLOGY

## 2019-12-16 PROCEDURE — G8428 CUR MEDS NOT DOCUMENT: HCPCS | Performed by: PSYCHIATRY & NEUROLOGY

## 2019-12-16 PROCEDURE — 3017F COLORECTAL CA SCREEN DOC REV: CPT | Performed by: PSYCHIATRY & NEUROLOGY

## 2019-12-16 PROCEDURE — G8484 FLU IMMUNIZE NO ADMIN: HCPCS | Performed by: PSYCHIATRY & NEUROLOGY

## 2019-12-16 RX ORDER — RISPERIDONE 1 MG/1
1 TABLET, FILM COATED ORAL NIGHTLY
Qty: 90 TABLET | Refills: 3 | Status: SHIPPED | OUTPATIENT
Start: 2019-12-16 | End: 2020-11-02

## 2020-01-02 ENCOUNTER — TELEPHONE (OUTPATIENT)
Dept: FAMILY MEDICINE CLINIC | Age: 55
End: 2020-01-02

## 2020-01-02 ENCOUNTER — NURSE TRIAGE (OUTPATIENT)
Dept: OTHER | Facility: CLINIC | Age: 55
End: 2020-01-02

## 2020-01-02 ENCOUNTER — OFFICE VISIT (OUTPATIENT)
Dept: FAMILY MEDICINE CLINIC | Age: 55
End: 2020-01-02
Payer: MEDICAID

## 2020-01-02 VITALS
RESPIRATION RATE: 16 BRPM | TEMPERATURE: 98.1 F | HEIGHT: 69 IN | BODY MASS INDEX: 36.29 KG/M2 | SYSTOLIC BLOOD PRESSURE: 155 MMHG | HEART RATE: 89 BPM | WEIGHT: 245 LBS | DIASTOLIC BLOOD PRESSURE: 94 MMHG

## 2020-01-02 PROCEDURE — 1036F TOBACCO NON-USER: CPT | Performed by: FAMILY MEDICINE

## 2020-01-02 PROCEDURE — G8427 DOCREV CUR MEDS BY ELIG CLIN: HCPCS | Performed by: FAMILY MEDICINE

## 2020-01-02 PROCEDURE — 3017F COLORECTAL CA SCREEN DOC REV: CPT | Performed by: FAMILY MEDICINE

## 2020-01-02 PROCEDURE — 99213 OFFICE O/P EST LOW 20 MIN: CPT | Performed by: FAMILY MEDICINE

## 2020-01-02 PROCEDURE — G8417 CALC BMI ABV UP PARAM F/U: HCPCS | Performed by: FAMILY MEDICINE

## 2020-01-02 PROCEDURE — G8484 FLU IMMUNIZE NO ADMIN: HCPCS | Performed by: FAMILY MEDICINE

## 2020-01-02 RX ORDER — TIZANIDINE 2 MG/1
2 TABLET ORAL EVERY 8 HOURS PRN
Qty: 30 TABLET | Refills: 0 | Status: SHIPPED | OUTPATIENT
Start: 2020-01-02 | End: 2020-01-31 | Stop reason: SDUPTHER

## 2020-01-02 NOTE — TELEPHONE ENCOUNTER
SCHEDULING US  ABD 1/06//20 @ [facility] @ 8:00 am   Arrive  @ 7:45 am  Main entrance  Desk across from gift shop    NPO  8 Hrs

## 2020-01-02 NOTE — PROGRESS NOTES
LIMITED; Future  -     tiZANidine (ZANAFLEX) 2 MG tablet; Take 1 tablet by mouth every 8 hours as needed (abd pain)        Return if symptoms worsen or fail to improve.    -No definitive mass, suspect that the structure in the LLQ is the inguinal canal, will obtain US to further evaluate. Suspect this is a muscle tear, will start zanaflex, cont NSAIDs.  -Patient advised to call immediately or go to ER if any worsening of symptoms        PATIENT COUNSELING      [facility] received counseling on the following healthy behaviors: medication adherence    Patient given educational materials on: See Attached    Discussed use, benefit, and side effects of prescribed medications. Barriers to medication compliance addressed. All patient questions answered. Pt voiced understanding.

## 2020-01-02 NOTE — TELEPHONE ENCOUNTER
Reason for Disposition   MILD pain that comes and goes (cramps) lasts > 24 hours    Protocols used: ABDOMINAL PAIN - MALE-ADULT-OH    Received call from [facility]. Pt calling c/o left side abdominal pain that has been intermittent for 2 weeks. He states about 2 weeks ago he stretched while sitting in his chair and felt something pull. Since then he has had left lower pain, can feel it when he is sitting or standing. IT is more a soreness, rates the pain a 2/10. At times the pain radiates up just a little. No vomiting, no diarrhea, no constipation, no urinary issues. No chest pain, no fever. He takes Tylenol daily. Recommend pt be seen in office today, call back if any new or worsening symptoms. Call soft transferred to [address] to schedule appointment.

## 2020-01-06 ENCOUNTER — TELEPHONE (OUTPATIENT)
Dept: FAMILY MEDICINE CLINIC | Age: 55
End: 2020-01-06

## 2020-01-06 NOTE — TELEPHONE ENCOUNTER
----- Message from Adam Lance DO sent at 1/6/2020  9:07 AM EST -----  The US revealed that he has a small hernia in the lower abdomen where he is having pain. I would like for him to see a surgeon to further evaluate this.   I will place the referral.

## 2020-01-08 ENCOUNTER — TELEPHONE (OUTPATIENT)
Dept: FAMILY MEDICINE CLINIC | Age: 55
End: 2020-01-08

## 2020-01-22 ENCOUNTER — TELEPHONE (OUTPATIENT)
Dept: PSYCHIATRY | Age: 55
End: 2020-01-22

## 2020-01-22 NOTE — TELEPHONE ENCOUNTER
Nancy Kramer called the office to report that he did a trial of \"discontinuing risperdal 1mg from date range 12/16-1/17. He states that by 1/19, he felt the need to resume the medication. He has refills of this medication on file at the pharmacy. He attended an appointment in the office 12/16 and is scheduled to return 4/16.

## 2020-01-31 RX ORDER — TIZANIDINE 2 MG/1
2 TABLET ORAL EVERY 8 HOURS PRN
Qty: 30 TABLET | Refills: 0 | Status: ON HOLD | OUTPATIENT
Start: 2020-01-31 | End: 2020-12-31

## 2020-02-02 ENCOUNTER — TELEPHONE (OUTPATIENT)
Dept: FAMILY MEDICINE CLINIC | Age: 55
End: 2020-02-02

## 2020-02-14 ENCOUNTER — OFFICE VISIT (OUTPATIENT)
Dept: FAMILY MEDICINE CLINIC | Age: 55
End: 2020-02-14
Payer: MEDICAID

## 2020-02-14 VITALS
HEART RATE: 74 BPM | SYSTOLIC BLOOD PRESSURE: 137 MMHG | RESPIRATION RATE: 12 BRPM | BODY MASS INDEX: 36.22 KG/M2 | WEIGHT: 239 LBS | TEMPERATURE: 98.4 F | DIASTOLIC BLOOD PRESSURE: 84 MMHG | HEIGHT: 68 IN

## 2020-02-14 PROCEDURE — G8484 FLU IMMUNIZE NO ADMIN: HCPCS | Performed by: FAMILY MEDICINE

## 2020-02-14 PROCEDURE — 3017F COLORECTAL CA SCREEN DOC REV: CPT | Performed by: FAMILY MEDICINE

## 2020-02-14 PROCEDURE — G0438 PPPS, INITIAL VISIT: HCPCS | Performed by: FAMILY MEDICINE

## 2020-02-14 ASSESSMENT — LIFESTYLE VARIABLES: HOW OFTEN DO YOU HAVE A DRINK CONTAINING ALCOHOL: 0

## 2020-02-14 ASSESSMENT — PATIENT HEALTH QUESTIONNAIRE - PHQ9
SUM OF ALL RESPONSES TO PHQ QUESTIONS 1-9: 0
SUM OF ALL RESPONSES TO PHQ QUESTIONS 1-9: 0

## 2020-02-14 NOTE — PROGRESS NOTES
past 3 months?: No  Do you eat fewer than 2 meals per day?: No  Have you seen a dentist within the past year?: Yes  Body mass index is 36.34 kg/m². Health Habits/Nutrition Interventions:  · Inadequate physical activity:  patient agrees to exercise for at least 150 minutes/week, limited right now due to weather and the hernia    Safety:  Safety  Do you have working smoke detectors?: Yes  Have all throw rugs been removed or fastened?: (!) No  Do you have non-slip mats or surfaces in all bathtubs/showers?: Yes  Do all of your stairways have a railing or banister?: (!) No  Are your doorways, halls and stairs free of clutter?: Yes  Do you always fasten your seatbelt when you are in a car?: Yes  Safety Interventions:  · Home safety tips provided    ADL:  ADLs  In the past 7 days, did you need help from others to perform any of the following everyday activities? Eating, dressing, grooming, bathing, toileting, or walking/balance?: None  In the past 7 days, did you need help from others to take care of any of the following? Laundry, housekeeping, banking/finances, shopping, telephone use, food preparation, transportation, or taking medications?: [employer], Housekeeping, Banking/Finances, Shopping, Food Preparation  ADL Interventions:  · Patient declines any further evaluation/treatment for this issue, relates this to his hernia, he is scheduled for repair in 5 days.     Personalized Preventive Plan   Current Health Maintenance Status  Immunization History   Administered Date(s) Administered    Influenza Virus Vaccine 11/01/2017, 09/29/2018    Zoster Recombinant (Shingrix) 08/25/2018        Health Maintenance   Topic Date Due    Annual Wellness Visit (AWV)  05/29/2019    Potassium monitoring  02/01/2021    Creatinine monitoring  02/01/2021    Lipid screen  05/14/2023    DTaP/Tdap/Td vaccine (2 - Td) 08/19/2026    Colon cancer screen colonoscopy  10/13/2027    Flu vaccine  Completed    Shingles Vaccine  Completed  Hepatitis C screen  Completed    HIV screen  Completed    Hepatitis A vaccine  Aged Out    Hepatitis B vaccine  Aged Out    Hib vaccine  Aged Out    Meningococcal (ACWY) vaccine  Aged Out    Pneumococcal 0-64 years Vaccine  Aged Out     Recommendations for [facility] Due: see orders and patient instructions/AVS.  . Recommended screening schedule for the next 5-10 years is provided to the patient in written form: see Patient Instructions/AVS.    Amarilis Rudolph was seen today for medicare aw.     Diagnoses and all orders for this visit:    Routine general medical examination at a health care facility

## 2020-03-13 ENCOUNTER — TELEPHONE (OUTPATIENT)
Dept: PSYCHIATRY | Age: 55
End: 2020-03-13

## 2020-03-26 ENCOUNTER — OFFICE VISIT (OUTPATIENT)
Dept: PSYCHIATRY | Age: 55
End: 2020-03-26
Payer: MEDICAID

## 2020-03-26 PROBLEM — F23 BRIEF PSYCHOTIC DISORDER (HCC): Status: ACTIVE | Noted: 2018-04-04

## 2020-03-26 PROCEDURE — 99213 OFFICE O/P EST LOW 20 MIN: CPT | Performed by: PSYCHIATRY & NEUROLOGY

## 2020-03-26 PROCEDURE — 1036F TOBACCO NON-USER: CPT | Performed by: PSYCHIATRY & NEUROLOGY

## 2020-03-26 PROCEDURE — G8428 CUR MEDS NOT DOCUMENT: HCPCS | Performed by: PSYCHIATRY & NEUROLOGY

## 2020-03-26 PROCEDURE — G8484 FLU IMMUNIZE NO ADMIN: HCPCS | Performed by: PSYCHIATRY & NEUROLOGY

## 2020-03-26 PROCEDURE — G8417 CALC BMI ABV UP PARAM F/U: HCPCS | Performed by: PSYCHIATRY & NEUROLOGY

## 2020-03-26 PROCEDURE — 3017F COLORECTAL CA SCREEN DOC REV: CPT | Performed by: PSYCHIATRY & NEUROLOGY

## 2020-03-26 RX ORDER — ZOLPIDEM TARTRATE 10 MG/1
10 TABLET ORAL NIGHTLY PRN
Qty: 30 TABLET | Refills: 2 | Status: SHIPPED | OUTPATIENT
Start: 2020-03-26 | End: 2020-08-27

## 2020-03-26 NOTE — PROGRESS NOTES
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 He says that he feels he is doing quite well and is not experiencing any symptoms. However I thought that his speech was a little bit more disorganized and he was a bit circumstantial bordering on tangential.  There also was a paranoid flavor to much of what he said, without being frankly paranoid. He has been using trazodone lately. However he said his sleep doctors told him to get off the trazodone (from an old leftover prescription) and back on Ambien for sleep. That is highly unusual for a sleep doctor, but I did go along with it. I have given it to him in the past and he hardly used it, so we discontinued it. I did offer Remeron but he declined. He has adequate supplies of both lamotrigine and Risperdal 1 mg. I do not think that should be lowered any further, given what I was seeing today. In the end I made no changes in the regimen aside from giving him Ambien.     Mental Status Examination    Level of consciousness:  within normal limits  Appearance:  well-appearing, street clothes, in chair, fgood grooming and good hygiene  Behavior/Motor:  no abnormalities noted  Attitude toward examiner:  cooperative, attentive and good eye contact  Speech:  spontaneous, normal rate, normal volume, well articulated and circumstantial  Mood:  euthymic  Affect:  mood congruent  Thought processes:  linear, goal directed and coherent  Thought content:  Homocidal ideation: none  Suicidal Ideation:  denies suicidal ideation  Delusions:  no evidence of delusions  Perceptual Disturbance:  denies any perceptual disturbance  Cognition:  oriented to person, place, and time  Concentration succeeded  Memory intact  Fund of knowledge:  good  Abstract thinking:  good  Insight:  good  Judgment:  good  Anxiety:   Generalized: No  Excessive Worry: No  Panic Attacks: No  Frequency:  Housebound: No   Obsession: No   Compulsion: No  Flashbacks:No  Nightmares No  Hyperarousal No    DIAGNOSTIC IMPRESSION  Brief psychotic disorder      Plan  Ambien prn for sleep  Current Outpatient Medications   Medication Sig Dispense Refill    zolpidem (AMBIEN) 10 MG tablet Take 1 tablet by mouth nightly as needed for Sleep for up to 90 days. 30 tablet 2    tiZANidine (ZANAFLEX) 2 MG tablet Take 1 tablet by mouth every 8 hours as needed (abd pain) 30 tablet 0    risperiDONE (RISPERDAL) 1 MG tablet Take 1 tablet by mouth nightly 90 tablet 3    lamoTRIgine (LAMICTAL) 200 MG tablet Take 1 tablet by mouth daily 90 tablet 3    lisinopril (PRINIVIL;ZESTRIL) 20 MG tablet TAKE 1 TABLET BY MOUTH DAILY 90 tablet 3    miconazole (ZEASORB-AF) 2 % powder Apply topically 2 times daily. 45 g 1    Cholecalciferol (VITAMIN D3) 56981 units TABS Take 1 tab PO q weekly for 8 weeks. 8 tablet 0    acetaminophen (TYLENOL) 325 MG tablet Take 650 mg by mouth every 6 hours as needed for Pain      aspirin 81 MG tablet Take 81 mg by mouth daily       No current facility-administered medications for this visit.

## 2020-04-16 ENCOUNTER — VIRTUAL VISIT (OUTPATIENT)
Dept: PSYCHIATRY | Age: 55
End: 2020-04-16
Payer: MEDICAID

## 2020-04-16 PROCEDURE — 1036F TOBACCO NON-USER: CPT | Performed by: PSYCHIATRY & NEUROLOGY

## 2020-04-16 PROCEDURE — 3017F COLORECTAL CA SCREEN DOC REV: CPT | Performed by: PSYCHIATRY & NEUROLOGY

## 2020-04-16 PROCEDURE — G8428 CUR MEDS NOT DOCUMENT: HCPCS | Performed by: PSYCHIATRY & NEUROLOGY

## 2020-04-16 PROCEDURE — G8417 CALC BMI ABV UP PARAM F/U: HCPCS | Performed by: PSYCHIATRY & NEUROLOGY

## 2020-04-16 PROCEDURE — 99441 PR PHYS/QHP TELEPHONE EVALUATION 5-10 MIN: CPT | Performed by: PSYCHIATRY & NEUROLOGY

## 2020-04-27 ENCOUNTER — TELEPHONE (OUTPATIENT)
Dept: FAMILY MEDICINE CLINIC | Age: 55
End: 2020-04-27

## 2020-04-27 NOTE — TELEPHONE ENCOUNTER
4/27/20 patient requesting Vitamin D level toxicity labs as he shows last was June 2018, unless Darrold Hudson shows something different. Please advise.    [facility]       Fredi/blm  Dolv: 2/14/20  Donv: 8/13/20

## 2020-04-27 NOTE — TELEPHONE ENCOUNTER
Malena Rao is calling back in response to Dr Alphonso Solorio, I told him he didn't call in a Rx, but didn't tell him what to take OTC.

## 2020-05-05 ENCOUNTER — TELEPHONE (OUTPATIENT)
Dept: FAMILY MEDICINE CLINIC | Age: 55
End: 2020-05-05

## 2020-05-11 RX ORDER — LISINOPRIL 20 MG/1
20 TABLET ORAL DAILY
Qty: 90 TABLET | Refills: 3 | Status: SHIPPED | OUTPATIENT
Start: 2020-05-11

## 2020-06-05 ENCOUNTER — TELEPHONE (OUTPATIENT)
Dept: FAMILY MEDICINE CLINIC | Age: 55
End: 2020-06-05

## 2020-06-05 ENCOUNTER — APPOINTMENT (OUTPATIENT)
Dept: GENERAL RADIOLOGY | Age: 55
End: 2020-06-05
Payer: MEDICAID

## 2020-06-05 ENCOUNTER — HOSPITAL ENCOUNTER (EMERGENCY)
Age: 55
Discharge: HOME | End: 2020-06-05
Attending: EMERGENCY MEDICINE
Payer: MEDICAID

## 2020-06-05 VITALS
HEIGHT: 69 IN | SYSTOLIC BLOOD PRESSURE: 115 MMHG | WEIGHT: 254 LBS | OXYGEN SATURATION: 96 % | RESPIRATION RATE: 16 BRPM | DIASTOLIC BLOOD PRESSURE: 73 MMHG | TEMPERATURE: 98.9 F | BODY MASS INDEX: 37.62 KG/M2 | HEART RATE: 76 BPM

## 2020-06-05 LAB
ALBUMIN SERPL-MCNC: 4.1 G/DL (ref 3.5–5.1)
ALP BLD-CCNC: 71 U/L (ref 38–126)
ALT SERPL-CCNC: 25 U/L (ref 11–66)
ANION GAP SERPL CALCULATED.3IONS-SCNC: 10 MEQ/L (ref 8–16)
AST SERPL-CCNC: 21 U/L (ref 5–40)
BASOPHILS # BLD: 0.3 %
BASOPHILS ABSOLUTE: 0 THOU/MM3 (ref 0–0.1)
BILIRUB SERPL-MCNC: 0.2 MG/DL (ref 0.3–1.2)
BUN BLDV-MCNC: 16 MG/DL (ref 7–22)
CALCIUM SERPL-MCNC: 9.8 MG/DL (ref 8.5–10.5)
CHLORIDE BLD-SCNC: 99 MEQ/L (ref 98–111)
CO2: 25 MEQ/L (ref 23–33)
CREAT SERPL-MCNC: 0.7 MG/DL (ref 0.4–1.2)
EKG ATRIAL RATE: 73 BPM
EKG P AXIS: 1 DEGREES
EKG P-R INTERVAL: 166 MS
EKG Q-T INTERVAL: 376 MS
EKG QRS DURATION: 90 MS
EKG QTC CALCULATION (BAZETT): 414 MS
EKG R AXIS: -12 DEGREES
EKG T AXIS: 35 DEGREES
EKG VENTRICULAR RATE: 73 BPM
EOSINOPHIL # BLD: 1 %
EOSINOPHILS ABSOLUTE: 0.1 THOU/MM3 (ref 0–0.4)
ERYTHROCYTE [DISTWIDTH] IN BLOOD BY AUTOMATED COUNT: 13.1 % (ref 11.5–14.5)
ERYTHROCYTE [DISTWIDTH] IN BLOOD BY AUTOMATED COUNT: 44.4 FL (ref 35–45)
GFR SERPL CREATININE-BSD FRML MDRD: > 90 ML/MIN/1.73M2
GLUCOSE BLD-MCNC: 112 MG/DL (ref 70–108)
HCT VFR BLD CALC: 43.4 % (ref 42–52)
HEMOGLOBIN: 14.1 GM/DL (ref 14–18)
IMMATURE GRANS (ABS): 0.06 THOU/MM3 (ref 0–0.07)
IMMATURE GRANULOCYTES: 0.5 %
LYMPHOCYTES # BLD: 21.8 %
LYMPHOCYTES ABSOLUTE: 2.5 THOU/MM3 (ref 1–4.8)
MCH RBC QN AUTO: 30.1 PG (ref 26–33)
MCHC RBC AUTO-ENTMCNC: 32.5 GM/DL (ref 32.2–35.5)
MCV RBC AUTO: 92.7 FL (ref 80–94)
MONOCYTES # BLD: 9.5 %
MONOCYTES ABSOLUTE: 1.1 THOU/MM3 (ref 0.4–1.3)
NUCLEATED RED BLOOD CELLS: 0 /100 WBC
OSMOLALITY CALCULATION: 270.2 MOSMOL/KG (ref 275–300)
PLATELET # BLD: 211 THOU/MM3 (ref 130–400)
PMV BLD AUTO: 10.2 FL (ref 9.4–12.4)
POTASSIUM REFLEX MAGNESIUM: 5.2 MEQ/L (ref 3.5–5.2)
RBC # BLD: 4.68 MILL/MM3 (ref 4.7–6.1)
SEG NEUTROPHILS: 66.9 %
SEGMENTED NEUTROPHILS ABSOLUTE COUNT: 7.6 THOU/MM3 (ref 1.8–7.7)
SODIUM BLD-SCNC: 134 MEQ/L (ref 135–145)
TOTAL PROTEIN: 6.8 G/DL (ref 6.1–8)
TROPONIN T: < 0.01 NG/ML
WBC # BLD: 11.4 THOU/MM3 (ref 4.8–10.8)

## 2020-06-05 PROCEDURE — 93005 ELECTROCARDIOGRAM TRACING: CPT | Performed by: EMERGENCY MEDICINE

## 2020-06-05 PROCEDURE — 72100 X-RAY EXAM L-S SPINE 2/3 VWS: CPT

## 2020-06-05 PROCEDURE — 99284 EMERGENCY DEPT VISIT MOD MDM: CPT

## 2020-06-05 PROCEDURE — 80053 COMPREHEN METABOLIC PANEL: CPT

## 2020-06-05 PROCEDURE — 80175 DRUG SCREEN QUAN LAMOTRIGINE: CPT

## 2020-06-05 PROCEDURE — 36415 COLL VENOUS BLD VENIPUNCTURE: CPT

## 2020-06-05 PROCEDURE — 84484 ASSAY OF TROPONIN QUANT: CPT

## 2020-06-05 PROCEDURE — 85025 COMPLETE CBC W/AUTO DIFF WBC: CPT

## 2020-06-05 ASSESSMENT — ENCOUNTER SYMPTOMS
VOMITING: 0
DIARRHEA: 0
ABDOMINAL PAIN: 0
TROUBLE SWALLOWING: 0
CHEST TIGHTNESS: 0
SHORTNESS OF BREATH: 0
BLOOD IN STOOL: 0
COUGH: 0
BACK PAIN: 1
VOICE CHANGE: 0
NAUSEA: 0

## 2020-06-05 ASSESSMENT — PAIN SCALES - GENERAL: PAINLEVEL_OUTOF10: 2

## 2020-06-05 ASSESSMENT — PAIN DESCRIPTION - DESCRIPTORS: DESCRIPTORS: DULL;PRESSURE;ACHING

## 2020-06-05 ASSESSMENT — PAIN DESCRIPTION - ONSET: ONSET: SUDDEN

## 2020-06-05 ASSESSMENT — PAIN DESCRIPTION - PAIN TYPE: TYPE: ACUTE PAIN

## 2020-06-05 ASSESSMENT — PAIN DESCRIPTION - LOCATION: LOCATION: BACK;NECK;SHOULDER;HEAD

## 2020-06-05 NOTE — TELEPHONE ENCOUNTER
[6/5/2020 8:21 AM]     [phone] called stating by a paper he has from [facility] he has to be seen today bridgette he fell. Zenobia Ye is his pcp not in, artemio 100%, naresh not in, patrica has one, should i have scheduled him with patrica? at the end of the call he says at 8:04 you are telling me there is no openings. yes, as long as you say there are none then i have to go to er which costs more money so on monday when the state finds out i had to go to er, you've said no openings. he was very business, to the point, and precise. you have any input? Office ok'd to put with patrica. Began calling at 8:26, calling land line, finally left a message at 8:31 to call as there is an opening. Cell phone rings busy.

## 2020-06-05 NOTE — ED PROVIDER NOTES
REVIEW OF SYSTEMS     Review of Systems   Constitutional: Negative for chills, diaphoresis and fever. HENT: Negative for trouble swallowing and voice change. Eyes: Negative for visual disturbance. Respiratory: Negative for cough, chest tightness and shortness of breath. Cardiovascular: Negative for chest pain and leg swelling. Gastrointestinal: Negative for abdominal pain, blood in stool, diarrhea, nausea and vomiting. Genitourinary: Negative for dysuria, frequency and hematuria. Musculoskeletal: Positive for back pain. Negative for neck pain. Skin: Negative for rash and wound. Neurological: Negative for speech difficulty, weakness, numbness and headaches. Psychiatric/Behavioral: Negative for confusion. Except as noted above the remainder of the review of systems was reviewed and is. PHYSICAL EXAM    (up to 7 for level 4, 8 or more for level 5)     ED Triage Vitals [06/05/20 0902]   BP Temp Temp Source Pulse Resp SpO2 Height Weight   115/85 98.9 °F (37.2 °C) Oral 86 16 96 % 5' 9\" (1.753 m) 254 lb (115.2 kg)       Physical Exam  Vitals signs and nursing note reviewed. Constitutional:       General: He is not in acute distress. Appearance: He is well-developed. He is not ill-appearing, toxic-appearing or diaphoretic. HENT:      Head: Normocephalic and atraumatic. Eyes:      General: No scleral icterus. Conjunctiva/sclera: Conjunctivae normal.      Right eye: Right conjunctiva is not injected. Left eye: Left conjunctiva is not injected. Pupils: Pupils are equal, round, and reactive to light. Neck:      Musculoskeletal: Normal range of motion and neck supple. Thyroid: No thyromegaly. Trachea: No tracheal deviation. Cardiovascular:      Rate and Rhythm: Normal rate and regular rhythm. Heart sounds: Normal heart sounds. No murmur. No friction rub. No gallop. Pulmonary:      Effort: Pulmonary effort is normal. No respiratory distress. dextro scoliosis. 2. Moderate disc space narrowing and degenerative disc disease L4-5. Moderate degenerative vertebral body spondylosis at this level anteriorly and minimal spurring at this level posteriorly. 3. Otherwise normal lumbar spine. No fracture. Sacroiliac joints unremarkable. **This report has been created using voice recognition software. It may contain minor errors which are inherent in voice recognition technology. **      Final report electronically signed by Dr. Lee Zavala on 6/5/2020 10:23 AM          LABS:  Labs Reviewed   CBC WITH AUTO DIFFERENTIAL - Abnormal; Notable for the following components:       Result Value    WBC 11.4 (*)     RBC 4.68 (*)     All other components within normal limits   COMPREHENSIVE METABOLIC PANEL W/ REFLEX TO MG FOR LOW K - Abnormal; Notable for the following components:    Glucose 112 (*)     Sodium 134 (*)     Total Bilirubin 0.2 (*)     All other components within normal limits   OSMOLALITY - Abnormal; Notable for the following components:    Osmolality Calc 270.2 (*)     All other components within normal limits   TROPONIN   ANION GAP   GLOMERULAR FILTRATION RATE, ESTIMATED   LAMOTRIGINE LEVEL       All other labs were within normal range or not returned as of this dictation. Please note, any cultures that may have been sent were not resulted at the time of this patient visit. EMERGENCY DEPARTMENT COURSE andMedical Decision Making:     MDM/   Patient was monitored in the emergency department several hours. During that time, he is remained hemodynamically stable. Denies any complaints at this time. Imaging shows no evidence of a lumbar spine injury at this time. Clinically has no hematuria or anything to suggest a renal injury, also no evidence of pulmonary contusion or head injury. I do not have an exact etiology for it may have caused this fall, whether this was truly syncope versus more mechanical nature.   We did fairly comprehensive work-up to rule out significant cardiac causes and other causes of significant syncope. None is apparent at this time. The patient feels comfortable being discharged, he strict return precautions have been discussed with him in follow-up instructions have as well. ED Medications administered this visit:  Medications - No data to display      Procedures: (None if blank)       CLINICAL       1. Contusion of multiple sites    2. Multiple abrasions    3.  Lumbar contusion, initial encounter          DISPOSITION/PLAN   DISPOSITION Decision To Discharge 06/05/2020 11:08:02 AM      PATIENT REFERRED TO:  Marion Cody DO  [address] [place] [address] Dr DORANTESMACK [facility] [address]  [phone]    In 3 days        DISCHARGE MEDICATIONS:  New Prescriptions    No medications on file              (Please note that portions of this note were completed with a voice recognition program.  Efforts were made to edit the dictations but occasionallywords are mis-transcribed.)      Nargis Zepeda DO,PETER (electronically signed)  Attending Physician, Emergency [address], DO  06/05/20 9907

## 2020-06-05 NOTE — TELEPHONE ENCOUNTER
Patient called back, it wasn't very clear what he was wanting to do. He mentioned the paper that he needed to be seen in 3 days from today 06/05/2020. I offered him appointments with Dr. Lisa Cavazos, but he stated that he does not have transportation and he will have to call back once he has found some.

## 2020-06-06 LAB — LAMOTRIGINE LEVEL: 2.1 UG/ML (ref 2.5–15)

## 2020-06-09 NOTE — TELEPHONE ENCOUNTER
Future Appointments   Date Time Provider Terrell Gaytan   6/30/2020 12:45 PM Lisa Brain, DO [facility] [address]   8/13/2020  1:30 PM [facility]   9/24/2020 10:00 AM MD Sandeep Raygoza

## 2020-08-28 ENCOUNTER — TELEPHONE (OUTPATIENT)
Dept: PSYCHIATRY | Age: 55
End: 2020-08-28

## 2020-08-31 NOTE — TELEPHONE ENCOUNTER
Henri Guerrero called back, Henri Guerrero is scheduled to a return visit on 9/24, he declined to make a earlier appointment, prompt patient , he felt that he was fine and did not need the earlier. Very pleasant but dismissive on phone.

## 2020-08-31 NOTE — TELEPHONE ENCOUNTER
----- Message from Isac Rodriguez sent at 8/31/2020  7:28 AM EDT -----    ----- Message -----  From: Sumeet Andres MD  Sent: 8/28/2020   3:44 PM EDT  To: David Reyna, [address]    Sounds like he has decompensated and needs to be seen, can one of you call him and see if he will come in early? I dont know when his follow up is scheduled.

## 2020-10-01 ENCOUNTER — TELEPHONE (OUTPATIENT)
Dept: FAMILY MEDICINE CLINIC | Age: 55
End: 2020-10-01

## 2020-10-01 NOTE — TELEPHONE ENCOUNTER
LVM for pt in regards to his VM left on 10/01/2020  pt did not leave reason as to what he needed on his VM

## 2020-11-05 ENCOUNTER — TELEPHONE (OUTPATIENT)
Dept: FAMILY MEDICINE CLINIC | Age: 55
End: 2020-11-05

## 2020-11-10 ENCOUNTER — TELEPHONE (OUTPATIENT)
Dept: PSYCHIATRY | Age: 55
End: 2020-11-10

## 2020-11-10 NOTE — TELEPHONE ENCOUNTER
Omari Hall called in seeking advice, he rambeled most of the conversation, he had heard that abilify could be used in place of risperidone, he is having issues with job and family,, Omari Hall is scheduled for a vv on 12/13 to discuss issues and concerns

## 2020-11-11 NOTE — TELEPHONE ENCOUNTER
----- Message from Agustin Christiansen MD sent at 11/10/2020  4:37 PM EST -----  There are a number of drugs that could substitute for risperidone, Abilify is one. It can cause weight gain and he may not want it for that reason. Two that generally don't cause weight issues are [place] and Geodon. I would prefer to talk to him about it before making changes.

## 2020-11-30 ENCOUNTER — TELEPHONE (OUTPATIENT)
Dept: PHYSICAL MEDICINE AND REHAB | Age: 55
End: 2020-11-30
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
## 2020-12-14 ENCOUNTER — VIRTUAL VISIT (OUTPATIENT)
Dept: PSYCHIATRY | Age: 55
End: 2020-12-14
Payer: MEDICAID

## 2020-12-14 PROCEDURE — G8417 CALC BMI ABV UP PARAM F/U: HCPCS | Performed by: PSYCHIATRY & NEUROLOGY

## 2020-12-14 PROCEDURE — G8427 DOCREV CUR MEDS BY ELIG CLIN: HCPCS | Performed by: PSYCHIATRY & NEUROLOGY

## 2020-12-14 PROCEDURE — 3017F COLORECTAL CA SCREEN DOC REV: CPT | Performed by: PSYCHIATRY & NEUROLOGY

## 2020-12-14 PROCEDURE — 99213 OFFICE O/P EST LOW 20 MIN: CPT | Performed by: PSYCHIATRY & NEUROLOGY

## 2020-12-14 PROCEDURE — 1036F TOBACCO NON-USER: CPT | Performed by: PSYCHIATRY & NEUROLOGY

## 2020-12-14 PROCEDURE — G8484 FLU IMMUNIZE NO ADMIN: HCPCS | Performed by: PSYCHIATRY & NEUROLOGY

## 2020-12-14 NOTE — PROGRESS NOTES
He is taking lamotrigine 200, and 1 mg of risperidone. It may be that the risperidone will need to be increased to a more effective dose. I did not do anything today because I really was uncertain while I was talking to him. He wants to be referred to someone else but needs to check to see whether they take [facility] [employer] insurance first.  Therefore he did not want to make any definitive statement about the future today. .    That at least was a reasonable thought from him. Mental Status Examination    Level of consciousness:  within normal limits  Appearance:  well-appearing, street clothes, in chair, good grooming and good hygiene  Behavior/Motor:  hyperactive  Attitude toward examiner:  cooperative, attentive, good eye contact and playful  Speech:  spontaneous, well articulated, rapid, hyperverbal and pressured  Mood:  euphoric? ? Affect:  mood congruent  Thought processes:  rapid and flight of ideas  Thought content:  Homocidal ideation: none  Suicidal Ideation:  denies suicidal ideation  Delusions:  no evidence of delusions  Perceptual Disturbance:  denies any perceptual disturbance  Cognition:  oriented to person, place, and time  Concentration succeeded  Memory intact  Fund of knowledge:  good  Abstract thinking:  good  Insight:  good  Judgment:  good  Anxiety:   Generalized: No  Excessive Worry: No  Panic Attacks: No  Frequency:  Housebound: No   Obsession: No   Compulsion: No  Flashbacks:No  Nightmares No  Hyperarousal No     DIAGNOSTIC IMPRESSION  Brief psyhotic disorder, r/o manic episode    Plan  ? referral  Increase risperidone?   Current Outpatient Medications   Medication Sig Dispense Refill    lamoTRIgine (LAMICTAL) 200 MG tablet TAKE 1 TABLET BY MOUTH DAILY 30 tablet 3    risperiDONE (RISPERDAL) 1 MG tablet TAKE 1 TABLET BY MOUTH EVERY NIGHT 30 tablet 5    diclofenac sodium (VOLTAREN) 1 % GEL Apply 2 g topically 4 times daily as needed for Pain 1 Tube 0

## 2020-12-16 ENCOUNTER — TELEPHONE (OUTPATIENT)
Dept: PSYCHIATRY | Age: 55
End: 2020-12-16

## 2020-12-16 NOTE — TELEPHONE ENCOUNTER
Barbara Button called the office today. He was very difficult to follow. He stated that he was under the impression that he was to be taking Abilify and that it was to be sent to his pharmacy and loosely referenced it as being covered by [facility] plus for $3.  Barbara Shaw was informed that Abilify was not previously prescribed and asked if it was to be replacing something else. He was quite irritable and stated, \"he knows, he can check the time stamped documentation. I expect it to be done in two hours or I will know how to make it happen. \"  He continued to ramble about calling nuns and stating that we should be doing our jobs. Patient attended an appointment on 12/14.

## 2020-12-17 NOTE — TELEPHONE ENCOUNTER
Dr. Reji Cash response:    I guess nothing else can be done. Does he have family that is not on the LIZ? In an emergency situation, it may be necessary to violate HIPPA to take care of the patient.

## 2020-12-17 NOTE — TELEPHONE ENCOUNTER
This writer's response:  He does not have a current LIZ on file (it  2020). Would it be appropriate to ask for a wellness check to be done since he is not voicing any SI/HI?        Dr. Eleazar Mace response:    yes

## 2020-12-17 NOTE — TELEPHONE ENCOUNTER
Dr. Zi Verma response:    MD Yaw Lucero Fears             No he was not making sense the other day when I talked to him, and he kept going on about Abilify. Maybe somebody else gave him some or he's remembering another time, but I don't think its even in his medication history. Does he have family who can take him to the ED? I think he's pretty disorganized.
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
## 2020-12-21 NOTE — TELEPHONE ENCOUNTER
Dr. Lance Saldaña response:    I wonder if we should fly this past Dr WATTS to see if we should be doing something else.

## 2020-12-21 NOTE — TELEPHONE ENCOUNTER
Dr. Chidi Khan was contacted and case reviewed. Dr. Chidi Khan said that in the event of an emergency LIZ would not be an issue. He recommended a safety check. This writer informed him that one was done and no action was taken. He stated that roommate, sister or physician can go to probate with concerns to mandate patient receive care. If granted, law enforcement would then have permission to force entry to apprehend patient. He said that if you wanted to contact the  or  and needed help, the  on 4E would be able to provide direction. **Of note-patient, roommate and sister have not called yet today.

## 2020-12-22 ENCOUNTER — TELEPHONE (OUTPATIENT)
Dept: PSYCHIATRY | Age: 55
End: 2020-12-22

## 2020-12-22 NOTE — TELEPHONE ENCOUNTER
Harinder`s sister contacted office needing  Directions, she had contacted probate court to obtain emergency guardianship, they informed her that his Dr. Rakesh Brown to fill out a statement of expert evaluation please advice.

## 2020-12-23 NOTE — TELEPHONE ENCOUNTER
----- Message from Jay Alanis MD sent at 12/22/2020  5:16 PM EST -----  Those are legal papers to take to court. I will do what I can but I have not seen him since 12/14/20, and that was on video. Not sure if that will suffice for court.  If you can get the papers to me a I will try

## 2020-12-24 NOTE — TELEPHONE ENCOUNTER
----- Message from Jay Alanis MD sent at 12/23/2020  4:46 PM EST -----  I filled out forms, will try and get them to you by tomorrow

## 2020-12-24 NOTE — TELEPHONE ENCOUNTER
Paperwork has been completed by provider; scanned into . A message for Harinder's sister was also left by my coworker with this information and that it is available for .

## 2020-12-28 ENCOUNTER — TELEPHONE (OUTPATIENT)
Dept: FAMILY MEDICINE CLINIC | Age: 55
End: 2020-12-28

## 2020-12-28 NOTE — TELEPHONE ENCOUNTER
2nd attempt to contact the pt re:overdue labs Dr Samantha Sanderson ordered on 4/27/20. No current HIPAA form on file so orders could not be mailed.

## 2020-12-30 ENCOUNTER — HOSPITAL ENCOUNTER (EMERGENCY)
Age: 55
Discharge: PSYCHIATRIC OR SUBSTANCE USE FACILITY | End: 2020-12-31
Attending: EMERGENCY MEDICINE
Payer: MEDICARE

## 2020-12-30 DIAGNOSIS — Z72.89 ABNORMAL SEXUAL BEHAVIOR: Primary | ICD-10-CM

## 2020-12-30 LAB
ALBUMIN SERPL-MCNC: 4.1 G/DL (ref 3.5–5.1)
ALP BLD-CCNC: 71 U/L (ref 38–126)
ALT SERPL-CCNC: 38 U/L (ref 11–66)
AMPHETAMINE+METHAMPHETAMINE URINE SCREEN: NEGATIVE
ANION GAP SERPL CALCULATED.3IONS-SCNC: 11 MEQ/L (ref 8–16)
AST SERPL-CCNC: 28 U/L (ref 5–40)
BACTERIA: ABNORMAL /HPF
BARBITURATE QUANTITATIVE URINE: NEGATIVE
BASOPHILS # BLD: 0.4 %
BASOPHILS ABSOLUTE: 0 THOU/MM3 (ref 0–0.1)
BENZODIAZEPINE QUANTITATIVE URINE: NEGATIVE
BILIRUB SERPL-MCNC: < 0.2 MG/DL (ref 0.3–1.2)
BILIRUBIN URINE: NEGATIVE
BLOOD, URINE: NEGATIVE
BUN BLDV-MCNC: 14 MG/DL (ref 7–22)
CALCIUM SERPL-MCNC: 10 MG/DL (ref 8.5–10.5)
CANNABINOID QUANTITATIVE URINE: NEGATIVE
CASTS 2: ABNORMAL /LPF
CASTS UA: ABNORMAL /LPF
CHARACTER, URINE: CLEAR
CHLORIDE BLD-SCNC: 99 MEQ/L (ref 98–111)
CO2: 23 MEQ/L (ref 23–33)
COCAINE METABOLITE QUANTITATIVE URINE: NEGATIVE
COLOR: YELLOW
CREAT SERPL-MCNC: 0.7 MG/DL (ref 0.4–1.2)
CRYSTALS, UA: ABNORMAL
EOSINOPHIL # BLD: 0.9 %
EOSINOPHILS ABSOLUTE: 0.1 THOU/MM3 (ref 0–0.4)
EPITHELIAL CELLS, UA: ABNORMAL /HPF
ERYTHROCYTE [DISTWIDTH] IN BLOOD BY AUTOMATED COUNT: 13.1 % (ref 11.5–14.5)
ERYTHROCYTE [DISTWIDTH] IN BLOOD BY AUTOMATED COUNT: 44.4 FL (ref 35–45)
ETHYL ALCOHOL, SERUM: < 0.01 %
GFR SERPL CREATININE-BSD FRML MDRD: > 90 ML/MIN/1.73M2
GLUCOSE BLD-MCNC: 164 MG/DL (ref 70–108)
GLUCOSE URINE: NEGATIVE MG/DL
HCT VFR BLD CALC: 43.4 % (ref 42–52)
HEMOGLOBIN: 14 GM/DL (ref 14–18)
IMMATURE GRANS (ABS): 0.05 THOU/MM3 (ref 0–0.07)
IMMATURE GRANULOCYTES: 0.5 %
KETONES, URINE: NEGATIVE
LEUKOCYTE ESTERASE, URINE: NEGATIVE
LYMPHOCYTES # BLD: 27.3 %
LYMPHOCYTES ABSOLUTE: 3 THOU/MM3 (ref 1–4.8)
MCH RBC QN AUTO: 30.2 PG (ref 26–33)
MCHC RBC AUTO-ENTMCNC: 32.3 GM/DL (ref 32.2–35.5)
MCV RBC AUTO: 93.7 FL (ref 80–94)
MISCELLANEOUS 2: ABNORMAL
MONOCYTES # BLD: 11.2 %
MONOCYTES ABSOLUTE: 1.2 THOU/MM3 (ref 0.4–1.3)
NITRITE, URINE: NEGATIVE
NUCLEATED RED BLOOD CELLS: 0 /100 WBC
OPIATES, URINE: NEGATIVE
OSMOLALITY CALCULATION: 270.5 MOSMOL/KG (ref 275–300)
OXYCODONE: NEGATIVE
PH UA: 6.5 (ref 5–9)
PHENCYCLIDINE QUANTITATIVE URINE: NEGATIVE
PLATELET # BLD: 215 THOU/MM3 (ref 130–400)
PMV BLD AUTO: 10.1 FL (ref 9.4–12.4)
POTASSIUM REFLEX MAGNESIUM: 4 MEQ/L (ref 3.5–5.2)
PROTEIN UA: ABNORMAL
RBC # BLD: 4.63 MILL/MM3 (ref 4.7–6.1)
RBC URINE: ABNORMAL /HPF
RENAL EPITHELIAL, UA: ABNORMAL
SARS-COV-2, NAAT: NOT DETECTED
SEG NEUTROPHILS: 59.7 %
SEGMENTED NEUTROPHILS ABSOLUTE COUNT: 6.6 THOU/MM3 (ref 1.8–7.7)
SODIUM BLD-SCNC: 133 MEQ/L (ref 135–145)
SPECIFIC GRAVITY, URINE: 1.01 (ref 1–1.03)
TOTAL PROTEIN: 7 G/DL (ref 6.1–8)
UROBILINOGEN, URINE: 0.2 EU/DL (ref 0–1)
WBC # BLD: 11.1 THOU/MM3 (ref 4.8–10.8)
WBC UA: ABNORMAL /HPF
YEAST: ABNORMAL

## 2020-12-30 PROCEDURE — 36415 COLL VENOUS BLD VENIPUNCTURE: CPT

## 2020-12-30 PROCEDURE — 81001 URINALYSIS AUTO W/SCOPE: CPT

## 2020-12-30 PROCEDURE — G0480 DRUG TEST DEF 1-7 CLASSES: HCPCS

## 2020-12-30 PROCEDURE — 99285 EMERGENCY DEPT VISIT HI MDM: CPT

## 2020-12-30 PROCEDURE — 6370000000 HC RX 637 (ALT 250 FOR IP): Performed by: EMERGENCY MEDICINE

## 2020-12-30 PROCEDURE — U0002 COVID-19 LAB TEST NON-CDC: HCPCS

## 2020-12-30 PROCEDURE — 80307 DRUG TEST PRSMV CHEM ANLYZR: CPT

## 2020-12-30 PROCEDURE — 80053 COMPREHEN METABOLIC PANEL: CPT

## 2020-12-30 PROCEDURE — 85025 COMPLETE CBC W/AUTO DIFF WBC: CPT

## 2020-12-30 RX ORDER — ACETAMINOPHEN 500 MG
1000 TABLET ORAL ONCE
Status: COMPLETED | OUTPATIENT
Start: 2020-12-30 | End: 2020-12-30

## 2020-12-30 RX ADMIN — ACETAMINOPHEN 1000 MG: 500 TABLET ORAL at 20:40

## 2020-12-30 ASSESSMENT — PAIN SCALES - GENERAL: PAINLEVEL_OUTOF10: 5

## 2020-12-30 NOTE — ED NOTES
Patient placed in safe room that is ligature resistant with continuous monitoring in place. Provider notified, requested an assessment by behavioral health . Patient belongings secured in a locked lockers outside of the room. Explained suicide prevention precautions to the patient including constant observer.         James Solitariout  15/82/85 6521

## 2020-12-30 NOTE — ED NOTES
Patient presents to the ed with Herminio Naylor under probate orders. Patient has flight of ideas. Cooperative at this time. Urine sample collected and sent to lab.       Hilton Mchugh [place]  81/85/41 7309

## 2020-12-30 NOTE — TELEPHONE ENCOUNTER
Carl Salvador (sister) called into the office stating that she did not receive the expert evaluation in the mail yet; she had spoke with Linda Lopez previously; I informed her that the mail service is delayed and that the mail does not go out everyday here at [address] [place] [address]. She said that on her end; she has completed guardianship paperwork and Harinder's roommate (albert) is meeting with an  at [facility] [employer] court to file the expert evaluation to  Prudy Opitz. The paperwork has also been faxed to Va Lopez at fax Number: [phone].

## 2020-12-30 NOTE — ED PROVIDER NOTES
[facility] [address] [address] EMERGENCY DEPT    EMERGENCY MEDICINE     Pt Name: Deyvi Gaston  MRN: [id number]  [place] 1965  Date of evaluation: 12/30/2020  Provider: Reilly Hsu MD,    CHIEF COMPLAINT       Chief Complaint   Patient presents with    Psychiatric Evaluation       HISTORY OF PRESENT ILLNESS    Deyvi Gaston is a pleasant 63 y.o. male who presents to the emergency department from home under probate for psychiatric evaluation. He recently had a brief psychotic disorder and during a follow-up evaluation a few weeks ago with his primary care physician he appeared to be very disorganized in his thought process. There is multiple telephone encounters listed in his chart in which his sister and his roommate are very concerned about his behavior and what they can do to obtain an emergency guardianship for the patient. The patient himself states that he is not suicidal, not homicidal, and not aware of why he is in the emergency department. He is tangential in his thought process and often manipulates the conversation to come back onto me and my history. Triage notes and Nursing notes were reviewed by myself. Any discrepancies are addressed above.     PAST MEDICAL HISTORY     Past Medical History:   Diagnosis Date    Arthritis     Bilateral posterior subcapsular polar cataract     Hypertension        SURGICAL HISTORY       Past Surgical History:   Procedure Laterality Date    HERNIA REPAIR      SALIVARY GLAND SURGERY Left 2000    TOE SURGERY         CURRENT MEDICATIONS       Discharge Medication List as of 12/31/2020  9:34 AM      CONTINUE these medications which have NOT CHANGED    Details   zolpidem (AMBIEN) 10 MG tablet TAKE 1 TABLET BY MOUTH EVERY NIGHT AS NEEDED FOR SLEEP, Disp-30 tablet, R-0Normal      lamoTRIgine (LAMICTAL) 200 MG tablet TAKE 1 TABLET BY MOUTH DAILY, Disp-30 tablet, R-3Normal      risperiDONE (RISPERDAL) 1 MG tablet TAKE 1 TABLET BY MOUTH EVERY NIGHT, Disp-30 tablet, R-5Normal diclofenac sodium (VOLTAREN) 1 % GEL Apply 2 g topically 4 times daily as needed for Pain, Topical, 4 TIMES DAILY PRN Starting 2020, Disp-1 Tube, R-0, Normal      lisinopril (PRINIVIL;ZESTRIL) 20 MG tablet Take 1 tablet by mouth daily, Disp-90 tablet, R-3Normal      tiZANidine (ZANAFLEX) 2 MG tablet Take 1 tablet by mouth every 8 hours as needed (abd pain), Disp-30 tablet, R-0Normal      miconazole (ZEASORB-AF) 2 % powder Apply topically 2 times daily. , Disp-45 g, R-1, Normal      Cholecalciferol (VITAMIN D3) 74384 units TABS Take 1 tab PO q weekly for 8 weeks. , Disp-8 tablet, R-0Normal      acetaminophen (TYLENOL) 325 MG tablet Take 650 mg by mouth every 6 hours as needed for PainHistorical Med      aspirin 81 MG tablet Take 81 mg by mouth daily             ALLERGIES     Codeine    FAMILY HISTORY     No family history on file.      SOCIAL HISTORY       Social History     Socioeconomic History    Marital status: Single     Spouse name: Not on file    Number of children: Not on file    Years of education: Not on file    Highest education level: Not on file   Occupational History    Not on file   Social Needs    Financial resource strain: Not on file    Food insecurity     Worry: Not on file     Inability: Not on file    Transportation needs     Medical: Not on file     Non-medical: Not on file   Tobacco Use    Smoking status: Former Smoker     Types: Cigars     Quit date: 2020     Years since quittin.9    Smokeless tobacco: Never Used   Substance and Sexual Activity    Alcohol use: No    Drug use: No    Sexual activity: Never   Lifestyle    Physical activity     Days per week: Not on file     Minutes per session: Not on file    Stress: Not on file   Relationships    Social connections     Talks on phone: Not on file     Gets together: Not on file     Attends Church service: Not on file     Active member of club or organization: Not on file Attends meetings of clubs or organizations: Not on file     Relationship status: Not on file    Intimate partner violence     Fear of current or ex partner: Not on file     Emotionally abused: Not on file     Physically abused: Not on file     Forced sexual activity: Not on file   Other Topics Concern    Not on file   Social History Narrative    Not on file       REVIEW OF SYSTEMS     Review of Systems   Unable to perform ROS: Psychiatric disorder       Except as noted above the remainder of the review of systems was reviewed and is. PHYSICAL EXAM    (up to 7 for level 4, 8 or more for level 5)     ED Triage Vitals [12/30/20 1755]   BP Temp Temp Source Pulse Resp SpO2 Height Weight   (!) 154/93 98.4 °F (36.9 °C) Oral 102 20 98 % -- --       Physical Exam  Vitals signs and nursing note reviewed. Constitutional:       General: He is not in acute distress. Appearance: He is normal weight. He is not ill-appearing. HENT:      Head: Normocephalic and atraumatic. Mouth/Throat:      Mouth: Mucous membranes are moist.      Pharynx: Oropharynx is clear. Eyes:      Extraocular Movements: Extraocular movements intact. Pupils: Pupils are equal, round, and reactive to light. Neck:      Musculoskeletal: Neck supple. Cardiovascular:      Rate and Rhythm: Normal rate and regular rhythm. Heart sounds: No murmur. Pulmonary:      Effort: Pulmonary effort is normal. No respiratory distress. Breath sounds: Normal breath sounds. No decreased breath sounds. Abdominal:      General: Bowel sounds are normal.      Palpations: Abdomen is soft. Tenderness: There is no abdominal tenderness. There is no guarding or rebound. Musculoskeletal:      Right lower leg: No edema. Left lower leg: No edema. Skin:     General: Skin is warm and dry. Capillary Refill: Capillary refill takes less than 2 seconds. Neurological:      General: No focal deficit present. Mental Status: He is alert. Psychiatric:         Attention and Perception: Attention normal.         Mood and Affect: Affect is labile and inappropriate. Speech: Speech is tangential.         Behavior: Behavior is agitated and aggressive. Thought Content: Thought content is delusional.         Judgment: Judgment is impulsive and inappropriate. DIAGNOSTIC RESULTS     EKG:(none if blank)  All EKG's are [facility] Phys[facility] who either signs or Co-signs this chart in the absence of a cardiologist.        RADIOLOGY: (none if blank)   Interpretation per the Radiologistbelow, if available at the time of this note:    No orders to display       LABS:  Labs Reviewed   CBC WITH AUTO DIFFERENTIAL - Abnormal; Notable for the following components:       Result Value    WBC 11.1 (*)     RBC 4.63 (*)     All other components within normal limits   COMPREHENSIVE METABOLIC PANEL W/ REFLEX TO MG FOR LOW K - Abnormal; Notable for the following components:    Glucose 164 (*)     Sodium 133 (*)     Total Bilirubin <0.2 (*)     All other components within normal limits   URINE WITH REFLEXED MICRO - Abnormal; Notable for the following components:    Protein, UA TRACE (*)     All other components within normal limits   OSMOLALITY - Abnormal; Notable for the following components:    Osmolality Calc 270.5 (*)     All other components within normal limits   ETHANOL   URINE DRUG SCREEN   ANION GAP   GLOMERULAR FILTRATION RATE, ESTIMATED   COVID-19       All other labs were within normal range or not returned as of this dictation. Please note, any cultures that may have been sent were not resulted at the time of this patient visit.     EMERGENCY DEPARTMENT COURSE andMedical Decision Making:     MDM  Number of Diagnoses or Management Options Diagnosis management comments: Patient is under a probate for psychiatric evaluation. Will medically clear with CBC, CMP, UA, urine drug screen, alcohol level.  /       The patient was evaluated during the global COVID-19 pandemic, and that diagnosis was considered upon their initial presentation. Their evaluation, treatment and testing was consistent with current guidelines for patients who present with complaints or symptoms that may be related to COVID-19. Strict returnprecautions and follow up instructions were discussed with the patient with which the patient agrees        ED Medications administered this visit:    Medications   acetaminophen (TYLENOL) tablet 1,000 mg (1,000 mg Oral Given 12/30/20 2040)         Procedures: (None if blank)       CLINICAL       1. Abnormal sexual behavior          DISPOSITION/PLAN   DISPOSITION Decision To Transfer 12/31/2020 09:19:25 AM      PATIENT REFERRED TO:  No follow-up provider specified.     DISCHARGE MEDICATIONS:  Discharge Medication List as of 12/31/2020  9:34 AM                 (Please note that portions of this note were completed with a voice recognition program.  Efforts were made to edit the dictations but occasionallywords are mis-transcribed.)      Electronically signed by Samantha Dalal MD on 12/30/20 at 6:32 PM EST    Attending Physician, Emergency Department       Mica Parikh MD  12/31/20 4859

## 2020-12-30 NOTE — TELEPHONE ENCOUNTER
Harinder's sister (michelle) stopped into the office stating that she had received the expert evaluation from this provider and on page 1; the box marked on question 1 is for an emergency issue in which everything according to probate court has to be done within 72 hours and they are just trying to file a regular guardianship because Shubham Jovel is supposed to be picked up today to get evaluated here at [address] [place] [address]. His sister is wanting to know if she is able to change the checked box from question 1 to A and not C for the regular guardianship from emergency? Please advise.      I have attached the page for your resource:

## 2020-12-30 NOTE — PROGRESS NOTES
Patient arrives under Probate status. Contacted Kaitlin at the [facility] and states she is not aware of pt status as a probate. Went to safe room, confirmed with deputy that pt is a probate. Contacted Jocelynn at [facility]. States they discovered paperwork for pt as a probate. Nia Smith will be seeing pt. She is not sure what order Arlen Barrios will be seeing pt's due to a pt at [facility]35 needing seen and two at [facility].

## 2020-12-31 ENCOUNTER — HOSPITAL ENCOUNTER (INPATIENT)
Age: 55
LOS: 18 days | Discharge: HOME | DRG: 885 | End: 2021-01-18
Attending: PSYCHIATRY & NEUROLOGY | Admitting: PSYCHIATRY & NEUROLOGY
Payer: MEDICARE

## 2020-12-31 VITALS
HEART RATE: 78 BPM | SYSTOLIC BLOOD PRESSURE: 139 MMHG | OXYGEN SATURATION: 98 % | DIASTOLIC BLOOD PRESSURE: 97 MMHG | TEMPERATURE: 98.4 F | RESPIRATION RATE: 20 BRPM

## 2020-12-31 DIAGNOSIS — F51.04 INSOMNIA, PSYCHOPHYSIOLOGICAL: Primary | ICD-10-CM

## 2020-12-31 PROBLEM — F30.9 MANIA (HCC): Status: ACTIVE | Noted: 2020-12-31

## 2020-12-31 PROCEDURE — 6370000000 HC RX 637 (ALT 250 FOR IP): Performed by: PSYCHIATRY & NEUROLOGY

## 2020-12-31 PROCEDURE — 99223 1ST HOSP IP/OBS HIGH 75: CPT | Performed by: PSYCHIATRY & NEUROLOGY

## 2020-12-31 PROCEDURE — 1240000000 HC EMOTIONAL WELLNESS R&B

## 2020-12-31 RX ORDER — HYDROXYZINE 50 MG/1
50 TABLET, FILM COATED ORAL 3 TIMES DAILY PRN
Status: DISCONTINUED | OUTPATIENT
Start: 2020-12-31 | End: 2021-01-18 | Stop reason: HOSPADM

## 2020-12-31 RX ORDER — IBUPROFEN 400 MG/1
400 TABLET ORAL EVERY 6 HOURS PRN
Status: DISCONTINUED | OUTPATIENT
Start: 2020-12-31 | End: 2021-01-06

## 2020-12-31 RX ORDER — MAGNESIUM HYDROXIDE/ALUMINUM HYDROXICE/SIMETHICONE 120; 1200; 1200 MG/30ML; MG/30ML; MG/30ML
30 SUSPENSION ORAL EVERY 6 HOURS PRN
Status: DISCONTINUED | OUTPATIENT
Start: 2020-12-31 | End: 2021-01-18 | Stop reason: HOSPADM

## 2020-12-31 RX ORDER — LORAZEPAM 1 MG/1
1 TABLET ORAL EVERY 4 HOURS PRN
Status: DISCONTINUED | OUTPATIENT
Start: 2020-12-31 | End: 2021-01-18 | Stop reason: HOSPADM

## 2020-12-31 RX ORDER — RISPERIDONE 1 MG/1
1 TABLET, FILM COATED ORAL 2 TIMES DAILY
Status: DISCONTINUED | OUTPATIENT
Start: 2020-12-31 | End: 2021-01-01

## 2020-12-31 RX ORDER — HALOPERIDOL 5 MG/ML
5 INJECTION INTRAMUSCULAR EVERY 4 HOURS PRN
Status: DISCONTINUED | OUTPATIENT
Start: 2020-12-31 | End: 2021-01-18 | Stop reason: HOSPADM

## 2020-12-31 RX ORDER — TRAZODONE HYDROCHLORIDE 50 MG/1
50 TABLET ORAL NIGHTLY PRN
Status: DISCONTINUED | OUTPATIENT
Start: 2020-12-31 | End: 2021-01-03

## 2020-12-31 RX ORDER — LAMOTRIGINE 25 MG/1
50 TABLET ORAL DAILY
Status: DISCONTINUED | OUTPATIENT
Start: 2021-01-01 | End: 2021-01-01

## 2020-12-31 RX ORDER — ACETAMINOPHEN 325 MG/1
650 TABLET ORAL EVERY 4 HOURS PRN
Status: DISCONTINUED | OUTPATIENT
Start: 2020-12-31 | End: 2021-01-18 | Stop reason: HOSPADM

## 2020-12-31 RX ORDER — LORAZEPAM 2 MG/ML
1 INJECTION INTRAMUSCULAR EVERY 4 HOURS PRN
Status: DISCONTINUED | OUTPATIENT
Start: 2020-12-31 | End: 2021-01-18 | Stop reason: HOSPADM

## 2020-12-31 RX ORDER — POLYETHYLENE GLYCOL 3350 17 G/17G
17 POWDER, FOR SOLUTION ORAL DAILY PRN
Status: DISCONTINUED | OUTPATIENT
Start: 2020-12-31 | End: 2021-01-18 | Stop reason: HOSPADM

## 2020-12-31 RX ORDER — LISINOPRIL 20 MG/1
20 TABLET ORAL DAILY
Status: DISCONTINUED | OUTPATIENT
Start: 2020-12-31 | End: 2021-01-18 | Stop reason: HOSPADM

## 2020-12-31 RX ORDER — HALOPERIDOL 5 MG
5 TABLET ORAL EVERY 4 HOURS PRN
Status: DISCONTINUED | OUTPATIENT
Start: 2020-12-31 | End: 2021-01-18 | Stop reason: HOSPADM

## 2020-12-31 RX ADMIN — ACETAMINOPHEN 650 MG: 325 TABLET, FILM COATED ORAL at 20:41

## 2020-12-31 RX ADMIN — LISINOPRIL 20 MG: 20 TABLET ORAL at 11:41

## 2020-12-31 RX ADMIN — ACETAMINOPHEN 650 MG: 325 TABLET, FILM COATED ORAL at 11:41

## 2020-12-31 RX ADMIN — LORAZEPAM 1 MG: 1 TABLET ORAL at 11:59

## 2020-12-31 RX ADMIN — HALOPERIDOL 5 MG: 5 TABLET ORAL at 11:59

## 2020-12-31 RX ADMIN — RISPERIDONE 1 MG: 1 TABLET ORAL at 20:41

## 2020-12-31 ASSESSMENT — PAIN SCALES - GENERAL
PAINLEVEL_OUTOF10: 8
PAINLEVEL_OUTOF10: 0
PAINLEVEL_OUTOF10: 4
PAINLEVEL_OUTOF10: 8

## 2020-12-31 ASSESSMENT — PAIN DESCRIPTION - PAIN TYPE
TYPE: CHRONIC PAIN
TYPE: CHRONIC PAIN

## 2020-12-31 ASSESSMENT — PAIN DESCRIPTION - LOCATION: LOCATION: GENERALIZED

## 2020-12-31 NOTE — TELEPHONE ENCOUNTER
Per Dr. Doni Parker;    I cannot read that document, it is in microprint and I couldn't increase it, but it is OK with me to change it, but she had better ask the court, they may not allow it. Where is he to be evaluated today? --I will reach out to Harinder's sister and inform her that the form can be changed.

## 2020-12-31 NOTE — ED NOTES
This RN went in to round on pt. Pt states to this RN \"You are a terrible nurse\". Pt requesting water. PT was given water. Vital signs updated. Pt under constant observation. Pt respirations easy and unlabored.       Jason Mora RN  12/31/20 4961

## 2020-12-31 NOTE — BH NOTE
Patient is uncooperative with staff, will not answer assessment questions, is focused on his Lisinopril, and knee pain.

## 2020-12-31 NOTE — ED NOTES
Pt laying on cot with respirations easy and unlabored. Pt under constant observation. Pt respirations easy and unlabored. Call light within reach.       Scot Curtis RN  12/30/20 1313

## 2020-12-31 NOTE — ED NOTES
Pt resting on bed rambling to himself. Pt denies needs at this time. Will continue to monitor.       Gerhard Soliz  12/30/20 2111

## 2020-12-31 NOTE — ED NOTES
Pt states \"are you going to admit me, cause I sure hope you do cause I have some people I need to see\". Pt also talking about how he doesn't care if jamarcus gets covid, he will still love him. Pt respirations easy and unlabored. Pt denies any further needs at this time.       Maryanne Vargas RN  12/30/20 3535

## 2020-12-31 NOTE — ED NOTES
Dr. Porfirio [facility]  accepting. Nurse to nurse line [phone].   H[address]ctor #[id number]     Jad Mcknight RN  12/31/20 843 [address] [place] [address], RN  12/31/20 3066

## 2020-12-31 NOTE — BH NOTE
`[facility]  Admission Note     Admission Type:   Admission Type: Involuntary    Reason for admission:  Reason for Admission: Patient with manic, inapprpriate behavior. Patient responding to internal stimuli, flight of ideas, was at [facility] found naked and masturbating. PATIENT STRENGTHS:  Strengths: (ZANA)    Patient Strengths and Limitations:  Limitations: (ZANA)    Addictive Behavior:   Addictive Behavior  In the past 3 months, have you felt or has someone told you that you have a problem with:  : (ZANA)  Do you have a history of Chemical Use?: (ZANA)  Do you have a history of Alcohol Use?: (ZANA)  Do you have a history of Street Drug Abuse?: (ZANA)  Histroy of Prescripton Drug Abuse?: (ZANA)    Medical Problems:   Past Medical History:   Diagnosis Date    Arthritis     Bilateral posterior subcapsular polar cataract     Hypertension        Status EXAM:  Status and Exam  Normal: No  Facial Expression: Avoids Gaze, Exaggerated, Hostile  Affect: Unstable, Inappropriate, Blunt  Level of Consciousness: Confused  Mood:Normal: No  Mood: Anxious, Labile, Suspicious, Irritable  Motor Activity:Normal: No  Motor Activity: Agitated, Increased, Repetitive Acts  Interview Behavior: Irritable, Uncooperative/Withdrawn  Preception: [place] to Person, [place] to Time, [place] to Place, [place] to Situation  Attention:Normal: No  Attention: Hyperalert, Unable to Concentrate, Distractible  Thought Processes: Flt.of Ideas, Loose Assoc. Thought Content:Normal: No  Thought Content: Delusions, Preoccupations  Hallucinations:  Auditory (Comment)  Delusions: Yes  Memory:Normal: No  Memory: Confabulation  Insight and Judgment: No  Insight and Judgment: Poor Judgment, Poor Insight, Unmotivated, Unrealistic  Present Suicidal Ideation: (ZANA)  Present Homicidal Ideation: (ZANA)    Tobacco Screening:  Practical Counseling, on admission, lina X, if applicable and completed (first 3 are required if patient doesn't refuse):            ( )  Recognizing danger situations (included triggers and roadblocks)                    ( )  Coping skills (new ways to manage stress, exercise, relaxation techniques, changing routine, distraction)                                                           ( )  Basic information about quitting (benefits of quitting, techniques in how to quit, available resources  ( ) Referral for counseling faxed to [facility]                                           ( x) Patient refused counseling  ( ) Patient has not smoked in the last 30 days    Metabolic Screening:    No results found for: LABA1C    Lab Results   Component Value Date    CHOL 205 (H) 05/14/2018     Lab Results   Component Value Date    TRIG 227 (H) 05/14/2018     Lab Results   Component Value Date    HDL 36 05/14/2018     No components found for: LDLCAL  No results found for: LABVLDL      There is no height or weight on file to calculate BMI. BP Readings from Last 2 Encounters:   12/31/20 (!) 168/104   12/31/20 (!) 139/97           Pt admitted with followings belongings:  Dentures: None  Vision - Corrective Lenses: Glasses  Hearing Aid: None  Jewelry: Necklace(2 colored silver necklace )  Body Piercings Removed: N/A  Clothing: Footwear, Pants, Shirt, Socks, Undergarments (Comment)  Were All Patient Medications Collected?: Not Applicable  Other Valuables: None     Valuables sent home with NA. Valuables placed in safe in security envelope, number:  [id number], [id number]. Patient's home medications were verified and reviewed with Psychiatrist.  Patient oriented to surroundings and program expectations and copy of patient rights given. Received admission packet:  NA.  Consents reviewed, signed NO CONSENTS. Refused ALL CONSENTS. Patient verbalize understanding:  ZANA. Patient education on precautions: ZANA    Pt admitted to unit A [facility] at 21  RM # 114 per provider order. Pt scanned with metal detector. Nourishment provided. Patient admitted from [address] Angel papers in chart  patient with manic, inapprpriate behavior. Patient responding to internal stimuli, flight of ideas, was at [facility] found naked and masturbating. On admission, patient refused to sign in voluntary, did not sign any paperwork/consents. Patient refused to talk to Aure Crowder. Patient manic, responding to internal stimuli, flight of ideas, hyper verbal. PRN oral meds given. Medications verified and ordered. Provider paged for orders. Will monitor pt for safety and behavior.                     Monika Weems RN No

## 2020-12-31 NOTE — ED NOTES
Registration went into room 25 to register patient. Pt became agitated and began screaming at the . Pt has been excessively rambling to himself and periodically screaming in his room.       Rupert Ibarra  12/30/20 2038

## 2020-12-31 NOTE — PROGRESS NOTES
Spoke with CSU. Patient accepted to [facility] by Dr. Shaji Joy. Room assignment chevier number [id number]. Nurse to nurse number [phone]. Patient will need to transported by [facility] department. Informed patients nurse.

## 2020-12-31 NOTE — ED NOTES
Pt arguing and yelling in room once deputy arrived to transport. Pt yelling at this RN, saying he was going to gris everyone and this hospital. States he knows all the right things to say to get his way and make things bad for us. Yelling at the deputy about previous encounters with [employer].       Jose Penn RN  12/31/20 5350

## 2020-12-31 NOTE — ED NOTES
Pt laying on cot and appears to be rambling at this time. Pt respirations easy and unlabored. PT call light within reach. Pt remains in a ligature resistant room.       Scot Curtis RN  12/31/20 0061

## 2020-12-31 NOTE — GROUP NOTE
Group Therapy Note    Date: 12/31/2020    Group Start Time: 1330  Group End Time: 9250  Group Topic: Recreational    [address], [facility]        Group Therapy Note    Attendees: 3/12    patient refused to attend recreation and leisure group at [phone] after encouragement from staff. 1:1 talk time provided as alternative to group session.             Signature:  John Soto, [address]

## 2020-12-31 NOTE — BH NOTE
Writer verified patient's home medications, spoke with Juliane Escalante from [facility] [phone].  Medications updated in home medication list.

## 2020-12-31 NOTE — ED NOTES
Pt laying on cot with lights dimmed for comfort. Pt respirations easy and unlabored. Pt denies any further needs at this time. Will monitor.       Alina Romero RN  12/31/20 4934

## 2020-12-31 NOTE — ED NOTES
Report called to [facility]. ETA of 0800.       Missy Campbell, MOLLY  12/31/20 2037

## 2020-12-31 NOTE — ED PROVIDER NOTES
Transfer of Care Note:   Physician Signing out: Malika Shanell   Receiving Physician: Chaz Colon M.D. Sign out time: 19:00      Brief history:  Here appropriate for abnormal behavior, patient found masturbating and running around naked at [facility]. Per report patient seems to be manipulative and tries to bring the conversations away from him to avoid answering questions. Items pending that need to be checked:  Rapid Covid test, ANTWAN arrangements for transfer. Tentative Impression of patient:  1. Abnormal behavior    Expected disposition of patient:  Pending results, transferred. Additional Assessment and results: The patient's initial evaluation and plan have been discussed with the prior physician who initially evaluated the patient. Nursing Notes, Past Medical Hx, Past Surgical Hx, Social Hx, Allergies, vital signs and Family Hx were all reviewed.       Vitals:    12/30/20 1755   BP: (!) 154/93   Pulse: 102   Resp: 20   Temp: 98.4 °F (36.9 °C)   SpO2: 98%         Labs Reviewed   CBC WITH AUTO DIFFERENTIAL - Abnormal; Notable for the following components:       Result Value    WBC 11.1 (*)     RBC 4.63 (*)     All other components within normal limits   COMPREHENSIVE METABOLIC PANEL W/ REFLEX TO MG FOR LOW K - Abnormal; Notable for the following components:    Glucose 164 (*)     Sodium 133 (*)     Total Bilirubin <0.2 (*)     All other components within normal limits   URINE WITH REFLEXED MICRO - Abnormal; Notable for the following components:    Protein, UA TRACE (*)     All other components within normal limits   OSMOLALITY - Abnormal; Notable for the following components:    Osmolality Calc 270.5 (*)     All other components within normal limits   ETHANOL   URINE DRUG SCREEN   ANION GAP   GLOMERULAR FILTRATION RATE, ESTIMATED   COVID-19         Medications   acetaminophen (TYLENOL) tablet 1,000 mg (1,000 mg Oral Given 12/30/20 2040)         No orders to display Further MDM and disposition:   Assessment: Abnormal behavior. Plan: Will await for ANTWAN arrangements for psychiatric transfer. Final diagnoses:   Abnormal sexual behavior     New Prescriptions    No medications on file         Condition: condition: fair  Dispo: Transfer of care to Dr. Jose M Mccord at 22:00, pending Covid swab result and ANTWAN arrangements for transfer. This transcription was electronically signed. It was dictated by use of voice recognition software and electronically transcribed. The transcription may contain errors not detected in proofreading.         Clarke Canavan, MD  12/30/20 6056

## 2020-12-31 NOTE — ED PROVIDER NOTES
Patient signed out to me by my evening colleague, patient is here for inappropriate behavior at the [facility]. Apparently he was found running around naked and masturbating. Patient had remained stable throughout course. Requiring no medical intervention by myself. Patient signed out to my morning colleague in stable condition.      Ebenezer Gong DO  12/31/20 7380

## 2020-12-31 NOTE — BH NOTE
Patient refused to sign any admission paperwork including admission voluntary form.  Patient has papers in chart regarding probate court from [facility]

## 2020-12-31 NOTE — H&P
Department of Psychiatry  Attending Physician Psychiatric Assessment     Reason for Admission to Psychiatric Unit:      A mental disorder causing major disability in social, interpersonal, occupational, and/or educational functioning that is leading to dangerous or life-threatening functioning, and that can only be addressed in an acute inpatient setting     Acute disordered/bizarre behavior or psychomotor agitation or retardation that interferes with activities of daily living    Concerns about patient's safety in the community      CHIEF COMPLAINT: Acute psychosis    History obtained from:  patient, electronic medical record and family members    HISTORY OF PRESENT ILLNESS:    Evelia Looney is a 63 y.o. male with significant past medical history of bipolar disorder who was admitted from [address] [place] [address] and has prob[address] court with [facility] upcoming. Patient is manic with inappropriate and bizarre behaviors. Is reported that at the [facility] center he was found naked and masturbating. He is rights oriented and refuses to provide information without \"documentation\". Patient is grandiose in his thought process and disorganized. He is making persecutory statements. During assessment patient presents manic. He is irritable, distractible, has decreased judgment, with flight of ideas, and pressured speech. 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 He then verbalizes that he has had 3 successful Supreme Court rulings in his favor, but will not disclose what the cases were about and states that they are \"personal\". Patient is a poor historian, evasive to questions, and tangential.  Difficult to get an accurate social medical history. Per review of patient's chart, it appears that his sister, Debbie Villalta, is attempting to obtain emergency guardianship. On December 16 patient called [employer] police office threatening them.   His roommate Alix Lincoln provided information about his bizarre behavior. He was admitted to [address] [place] [address]. [facility] at that time. He has been seeing his psychiatrist Dr. Kermit Rico for many years, and states he has had a history of psychiatric illnesses since the age of 20. He will not endorse or deny perceptual disturbances, thoughts of self-harm or history of suicide attempts. He is focused on starting the medication Abilify, but per chart review has never historically taken it. He believes the medication is cheaper than his current antipsychotic Risperdal.  On admission to the unit patient was accepting of Haldol 5 mg and Ativan 1 mg p.o. He has been cooperative on the unit and no events have occurred at time of note. PSYCHIATRIC HISTORY: Yes  Currently follows with Dr. Garth Lopez lifetime suicide attempts  Multiple psychiatric hospital admissions    Past psychiatric medications includes: Lamictal 200 mg daily, Risperdal nightly, Ambien 10 mg nightly, which appears to be discontinued    Adverse reactions from psychotropic medications: Denies    Lifetime Psychiatric Review of Systems       Depression: Unable to assess, does not currently present as depressed     Kalie or Hypomania: Current manic episode     Panic Attacks: denies      Phobias: denies     Obsessions and Compulsions:denies     Body or Vocal Tics:  denies     Hallucinations: Unable to assess, does not endorse or deny     Delusions: Grandiose    Past Medical History:        Diagnosis Date    Arthritis     Bilateral posterior subcapsular polar cataract     Hypertension        Past Surgical History:        Procedure Laterality Date    HERNIA REPAIR      SALIVARY GLAND SURGERY Left 2000    TOE SURGERY         Allergies:  Codeine    Social History:     Patient [place] in [place] He states his highest level of education is an associates degree in engineering. 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 He verbalizes that he lives with a roommate.   It appears that his sister is attempting to gain guardianship. He denies being  or having any children. He then states that the social history questions are \"too personal\". This fiordaliza actually try to correct the laugh at him by    DRUG USE HISTORY  Social History     Tobacco Use   Smoking Status Former Smoker    Types: Cigars    Quit date: 2020    Years since quittin.9   Smokeless Tobacco Never Used     Social History     Substance and Sexual Activity   Alcohol Use No     Social History     Substance and Sexual Activity   Drug Use No     Denies alcohol and substance use    LEGAL HISTORY:   HISTORY OF INCARCERATION: Denies     Family History:   No family history on file. Psychiatric Family History  No pertinent psychiatric family history    PHYSICAL EXAM:  Vitals:  BP (!) 168/104   Pulse 69   Temp 97.9 °F (36.6 °C) (Oral)   Resp 18        Physical Exam:      Constitutional:  Appears stated age, well-developed and well-nourished, no acute distress  Neurological: cranial nerves II-XII grossly in tact, normal gait and station         Mental Status Examination:    Level of consciousness:  Alert  Appearance:  Hospital attire, sitting in chair, disheveled  Behavior/Motor: no abnormalities noted  Attitude toward examiner: Persecutory, paranoid, evasive   Speech: normal rate and volume  Mood:  irritable  Affect:  Labile  Thought processes:  Tangential, Flight of Ideas, Loose Associations and Evasive  Thought content: Denies suicidal ideations              denies homicidal ideations               Denies hallucinations              Grandiose delusions  Cognition:  Oriented to self, location, time, situation  Concentration scattered/tangential  Memory: recent and remote memory intact, evasive in providing responses to remote history.   Insight &Judgment: Absent    DSM-5 Diagnosis  Bipolar disorder, manic episode    Psychosocial and Contextual factors:  Financial  Occupational  Relationship  Legal   Living situation  Educational     Past Medical History:   Diagnosis Date    Arthritis     Bilateral posterior subcapsular polar cataract     Hypertension         TREATMENT PLAN    Risk Management:  close watch per standard protocol      Psychotherapy:  participation in milieu and group and individual sessions with Attending Physician,  and Physician Assistant/CNP      Estimated length of stay:  2-14 days      GENERAL PATIENT/FAMILY EDUCATION  Patient will understand basic signs and symptoms, Patient will understand benefits/risks and potential side effects from proposed meds and Patient will understand their role in recovery. Family is  active in patient's care. Patient assets that may be helpful during treatment include: Intent to participate and engage in treatment, sufficient fund of knowledge and intellect to understand and utilize treatments. Goals:    Remission of suicidal ideation  Improvement of delusional thoughts and thought disorganization  Stability of symptoms x2 to 3 days prior to discharge  Encouraged patient to engage in groups, milieu, and individual therapies offered as part of programing. Behavioral Services  Medicare Certification     Admission Day 1  I certify that this patient's inpatient psychiatric hospital admission is medically necessary for:    x (1) treatment which could reasonably be expected to improve this patient's condition, or    x (2) diagnostic study or its equivalent. Time Spent: 1 hour      Physicians Signature:  Electronically signed by BETTY Santiago CNP on 12/31/20 at 2:53 PM EST    Discussed plan with Dr. Nai Villa  I independently saw and evaluated the patient. I reviewed the midlevel provider's documentation above. Any additional comments or changes to the midlevel provider's documentation are stated below otherwise agree with assessment. Noted the circumstances of the patient's admission. He appears to be in a manic phase.   He has been sexually disinhibited and hyperactive    The patient was seen at bedside. He was somnolent. He woke up and wanted me to ask my questions in writing so he could have a record of them. The patient reports a history of bipolar disorder since the age of 20. He has been treated with a number of medications it was not clear when the patient has last taken his lamotrigine. We will hold it at this time    The patient was discharged focused. He was unable to give a good account of the circumstances of his admission    PLAN  Start risperidone 100 twice daily  Attempt to develop insight  Psycho-education conducted. Supportive Therapy conducted.   Probable discharge is indeterminate  Follow-up daily while on inpatient unit    Electronically signed by Debbie Lockett MD on 12/31/20 at 4:36 PM EST

## 2020-12-31 NOTE — ED NOTES
This RN received report from [facility]. Pt laying on cot with respirations easy and unlabored. Pt denies any further needs at this time. Pt updated on POC. Pt under ligature resistant room.       Ana Varghese RN  12/30/20 5566

## 2020-12-31 NOTE — ED NOTES
Pt laying on cot with respirations easy and unlabored. Pt is rambling about [place], [facility] and Lisinopril. Pt call light within reach. Pt denies any further needs at this time.       Lluvia Hernandez RN  12/31/20 0534

## 2020-12-31 NOTE — ED NOTES
ED nurse-to-nurse bedside report    Chief Complaint   Patient presents with    Psychiatric Evaluation      LOC: alert and orientated to name, place, date  Vital signs   Vitals:    12/30/20 1755 12/30/20 2239 12/31/20 0304   BP: (!) 154/93 (!) 144/85 (!) 139/97   Pulse: 102 85 78   Resp: 20 20 20   Temp: 98.4 °F (36.9 °C)     TempSrc: Oral     SpO2: 98% 96% 98%      Pain:    Pain Interventions: N/A  Pain Goal: N/A  Oxygen: No    Current needs required Transport   Telemetry: No  LDAs:    Continuous Infusions:   Mobility: Independent  [place] Fall Risk Score:    Fall Risk 2/14/2020   2 or more falls in past year? no   Fall with injury in past year? no     Fall Interventions: Siderails up, call light within reach   Report given to: Denis Barry RN  12/31/20 3738

## 2020-12-31 NOTE — ED NOTES
Pt laying on cot with respirations easy and unlabored. Pt denies any further needs at this time. Will continue to monitor.         Gisela Aviles RN  12/31/20 6449

## 2020-12-31 NOTE — ED NOTES
Pt medicated per order. Pt also given food per verbal order from Dr. Reji Barrios. Pt appears to be rambling at this time, and not making much sense. Michelle Chan professional staff at bedside.       Sejal Hoover RN  12/30/20 7034

## 2020-12-31 NOTE — PROGRESS NOTES
0800  Call from [facility] who are working on getting pt transported to [facility].

## 2021-01-01 LAB
ALBUMIN SERPL-MCNC: 4.5 G/DL (ref 3.5–5.2)
ALBUMIN/GLOBULIN RATIO: ABNORMAL (ref 1–2.5)
ALP BLD-CCNC: 67 U/L (ref 40–129)
ALT SERPL-CCNC: 39 U/L (ref 5–41)
ANION GAP SERPL CALCULATED.3IONS-SCNC: 12 MMOL/L (ref 9–17)
AST SERPL-CCNC: 23 U/L
BILIRUB SERPL-MCNC: 0.35 MG/DL (ref 0.3–1.2)
BUN BLDV-MCNC: 15 MG/DL (ref 6–20)
BUN/CREAT BLD: ABNORMAL (ref 9–20)
CALCIUM SERPL-MCNC: 10.5 MG/DL (ref 8.6–10.4)
CHLORIDE BLD-SCNC: 97 MMOL/L (ref 98–107)
CO2: 27 MMOL/L (ref 20–31)
CREAT SERPL-MCNC: 0.84 MG/DL (ref 0.7–1.2)
GFR AFRICAN AMERICAN: >60 ML/MIN
GFR NON-AFRICAN AMERICAN: >60 ML/MIN
GFR SERPL CREATININE-BSD FRML MDRD: ABNORMAL ML/MIN/{1.73_M2}
GFR SERPL CREATININE-BSD FRML MDRD: ABNORMAL ML/MIN/{1.73_M2}
GLUCOSE BLD-MCNC: 133 MG/DL (ref 70–99)
HCT VFR BLD CALC: 44.4 % (ref 41–53)
HEMOGLOBIN: 14.6 G/DL (ref 13.5–17.5)
MCH RBC QN AUTO: 29.7 PG (ref 26–34)
MCHC RBC AUTO-ENTMCNC: 33 G/DL (ref 31–37)
MCV RBC AUTO: 90 FL (ref 80–100)
NRBC AUTOMATED: NORMAL PER 100 WBC
PDW BLD-RTO: 13.1 % (ref 11.5–14.9)
PLATELET # BLD: 234 K/UL (ref 150–450)
PMV BLD AUTO: 8.4 FL (ref 6–12)
POTASSIUM SERPL-SCNC: 4.4 MMOL/L (ref 3.7–5.3)
RBC # BLD: 4.93 M/UL (ref 4.5–5.9)
SODIUM BLD-SCNC: 136 MMOL/L (ref 135–144)
TOTAL PROTEIN: 7.4 G/DL (ref 6.4–8.3)
TSH SERPL DL<=0.05 MIU/L-ACNC: 1.06 MIU/L (ref 0.3–5)
WBC # BLD: 10 K/UL (ref 3.5–11)

## 2021-01-01 PROCEDURE — 6370000000 HC RX 637 (ALT 250 FOR IP): Performed by: PSYCHIATRY & NEUROLOGY

## 2021-01-01 PROCEDURE — 6370000000 HC RX 637 (ALT 250 FOR IP): Performed by: NURSE PRACTITIONER

## 2021-01-01 PROCEDURE — 85027 COMPLETE CBC AUTOMATED: CPT

## 2021-01-01 PROCEDURE — 80053 COMPREHEN METABOLIC PANEL: CPT

## 2021-01-01 PROCEDURE — 99232 SBSQ HOSP IP/OBS MODERATE 35: CPT | Performed by: PSYCHIATRY & NEUROLOGY

## 2021-01-01 PROCEDURE — 1240000000 HC EMOTIONAL WELLNESS R&B

## 2021-01-01 PROCEDURE — 36415 COLL VENOUS BLD VENIPUNCTURE: CPT

## 2021-01-01 PROCEDURE — 84443 ASSAY THYROID STIM HORMONE: CPT

## 2021-01-01 RX ORDER — RISPERIDONE 2 MG/1
2 TABLET, FILM COATED ORAL 2 TIMES DAILY
Status: DISCONTINUED | OUTPATIENT
Start: 2021-01-01 | End: 2021-01-05

## 2021-01-01 RX ORDER — LAMOTRIGINE 100 MG/1
200 TABLET ORAL DAILY
Status: DISCONTINUED | OUTPATIENT
Start: 2021-01-02 | End: 2021-01-18 | Stop reason: HOSPADM

## 2021-01-01 RX ADMIN — HYDROXYZINE HYDROCHLORIDE 50 MG: 50 TABLET, FILM COATED ORAL at 22:14

## 2021-01-01 RX ADMIN — RISPERIDONE 2 MG: 2 TABLET ORAL at 21:51

## 2021-01-01 RX ADMIN — LISINOPRIL 20 MG: 20 TABLET ORAL at 08:25

## 2021-01-01 RX ADMIN — ACETAMINOPHEN 650 MG: 325 TABLET, FILM COATED ORAL at 12:28

## 2021-01-01 RX ADMIN — RISPERIDONE 1 MG: 1 TABLET ORAL at 08:25

## 2021-01-01 RX ADMIN — TRAZODONE HYDROCHLORIDE 50 MG: 50 TABLET ORAL at 21:51

## 2021-01-01 RX ADMIN — ACETAMINOPHEN 650 MG: 325 TABLET, FILM COATED ORAL at 08:25

## 2021-01-01 RX ADMIN — LAMOTRIGINE 50 MG: 25 TABLET ORAL at 08:25

## 2021-01-01 ASSESSMENT — SLEEP AND FATIGUE QUESTIONNAIRES
DO YOU USE A SLEEP AID: NO
DO YOU HAVE DIFFICULTY SLEEPING: NO

## 2021-01-01 ASSESSMENT — PAIN SCALES - GENERAL
PAINLEVEL_OUTOF10: 6
PAINLEVEL_OUTOF10: 2

## 2021-01-01 NOTE — PLAN OF CARE
Treatment Goals: reviewed group plans and strategies for care    Method:group therapy, medication compliance, individualized assessments and care planning    Outcome: needs reinforcement    PATIENT GOALS: to be discussed with patient within 72 hours    PLAN/TREATMENT RECOMMENDATIONS:     continue group therapy , medications compliance, goal setting, individualized assessments and care, continue to monitor pt on unit      SHORT-TERM GOALS:   Time frame for Short-Term Goals: 5-7 days    LONG-TERM GOALS:  Time frame for Long-Term Goals: 6 months  Members Present in Team Meeting: See Signature [address]

## 2021-01-01 NOTE — PLAN OF CARE
Problem: Pain:  Goal: Pain level will decrease  Description: Pain level will decrease  1/1/2021 1051 by Emelia Felix LPN  Outcome: Ongoing   Pain to knee is lessened with the use of Tylenol  Problem: Altered Mood, Deterioration in Function:  Goal: Able to verbalize reality based thinking  Description: Able to verbalize reality based thinking  1/1/2021 1051 by Wanda Miguel LPN  Outcome: Ongoing   Cindy San is seen in the dayroom,  hyper verbal, bizarre , tangential.  Took scheduled medications, Denies any issues with sleep or appetite. Hygiene is good, social with select peers.  Denies any suicidal ideation, 15 minute safety checks continue

## 2021-01-01 NOTE — BH NOTE
BHI Biopsychosocial Assessment - Unable to assess due to impact of psychiatric symptoms. Documentation includes ED notes at time of admission. Additional attempts to meet with PT will continue once stabilized on medications. Current Level of Psychosocial Functioning      Independent   Dependent  X  Minimal Assist      Comments:  Client has a principle diagnosis of Bipolar 1 Disorder current thalia, which is the is the condition established to be chiefly responsible for the admission of the client on this date.        Psychosocial High-Risk Factors (check all that apply)     Unable to obtain meds   Chronic illness/pain    Not taking medications  Substance abuse   Lack of Family Support   Addictive Behaviors  Financial stress   Isolation  Inadequate Community Resources  Suicide attempt(s)   Homicidal ideations  Self-mutilation  Victim of crime   Legal issues  Developmental Delay  Unable to manage personal needs  X  Age 72 or older   Homeless  No transportation   Readmission within 30 days   Unemployment  Traumatic Event X    Psychiatric Advanced Directives:   Nothing reported     Family to Involve in Treatment:  Client's sister is involved in treatment and is seeking guardianship; emergency contact is Mesha Magdaleno [phone] (no LIZ)     Sexual Orientation:   Single     Patient Strengths:  [facility] Medicare/[place] Medicaid; safe housing; employed part-time     Patient Barriers:   Manic behaviors     Opiate/AOD Referral and/or Education Provided:   No substance abuse     CMHC/mental health history:   [address], Dr[address] Galeano Books at [facility] of Care:  Medication management, group/individual therapies, family meetings, psychoeducation, 1:1 counseling, treatment team meetings to assist with stabilization     Safety Plan:  Writer initiates safety plan at time of assessment and will be reviewed again in a group setting      Initial Discharge Plan:  Stabilize mood and medications; explore social support systems within community to increase socialization; provide 24/7 local and national hotline numbers for additional support; safety plan to be completed; disclose/discuss suicidal ideas will improve, decrease depressive symptoms, absence of self-harm; return to address on face-sheet; follow-up with Rylan Campo     Clinical Summary:  Unable to meet with client on this date. Notes from ED. Client is oriented to self, location, time, situation. Client denies SI/HI; no legal issues; denies paranoia; but does present with delusions of grandeur. Nolvia Stuart is a 62-year old male who was at [facility] and started acting bizarre as well as walking around the unit naked and being sexually inappropriate. 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 Writer will continue to meet with client and encourage participation in groups and other BHI programming.

## 2021-01-01 NOTE — PLAN OF CARE
Problem: Altered Mood, Deterioration in Function:  Goal: Able to verbalize reality based thinking  Description: Able to verbalize reality based thinking  Outcome: Ongoing     Problem: Altered Mood, Psychotic Behavior:  Goal: Able to verbalize decrease in frequency and intensity of hallucinations  Description: Able to verbalize decrease in frequency and intensity of hallucinations  Outcome: Ongoing   Patient denies suicidal ideas. No sign of self harm noted. Patient seen responding to internal stimuli with flight of ideas. Patient refused further assessment with writer. Patient encouraged to seek staff if hallucination intensified. Patient remained isolative in room, out for needs only. Will continue to monitor for safety every 15 minutes and provide support as needed.

## 2021-01-01 NOTE — GROUP NOTE
Group Therapy Note    Date: 1/1/2021    Group Start Time: 0900  Group End Time: 0920  Group Topic: Community Meeting    [address] [place] [id number] A    Jose Turk    Patient declined to attend community meeting group at 0900 despite encouragement from staff. 1:1 talk time offered by staff as alternative to group session.         Signature:  Jose Turk

## 2021-01-01 NOTE — GROUP NOTE
Group Therapy Note    Date: 1/1/2021    Group Start Time: 1600  Group End Time: 6151  Group Topic: Healthy Living/Wellness    [facility]    Vicente Venegas RN        Group Therapy Note    Attendees: 8         Patient's Goal:  Expression: 2020/202    Notes: All patients were able to express thoughts/feelings appropriately    Status After Intervention:  Improved    Participation Level:  Active Listener and Interactive    Participation Quality: Appropriate, Attentive, Sharing and Supportive      Speech:  normal      Thought Process/Content: Logical      Affective Functioning: Congruent      Mood: Calm      Level of consciousness:  Oriented x4      Response to Learning: Able to verbalize current knowledge/experience      Endings: None Reported    Modes of Intervention: Education, Support, Socialization and Exploration      Discipline Responsible: Registered Nurse      Signature:  Vicente Venegas RN

## 2021-01-01 NOTE — GROUP NOTE
Group Therapy Note    Date: 1/1/2021    Group Start Time: 1000  Group End Time: 1350  Group Topic: Psychoeducation    [facility] SUMEET Dean LSW        Group Therapy Note    Attendees: 6/13         Patient was offered group therapy today but declined to participate. 1:1 was offered.     Signature:  SUMEET Varghese, KRISTY

## 2021-01-01 NOTE — GROUP NOTE
Group Therapy Note    Date: 1/1/2021    Group Start Time: 1330  Group End Time: 8987  Group Topic: Cognitive Skills    [facility] GALICIA    Dev Marion        Group Therapy Note    Attendees: 8/12         Patient's Goal:  To demonstrate improved interpersonal skills     Notes:  Patient was pleasant and appropriate, patient speech was pressured but he was able to follow redirection     Status After Intervention:  Improved    Participation Level:  Active Listener and Interactive    Participation Quality: Appropriate and Attentive      Speech:  pressured      Thought Process/Content: Logical      Affective Functioning: Congruent      Mood: euthymic      Level of consciousness:  Alert, Oriented x4 and Attentive      Response to Learning: Able to verbalize current knowledge/experience, Able to verbalize/acknowledge new learning and Progressing to goal      Endings: None Reported    Modes of Intervention: Education, Support, Socialization, Exploration, Clarifying and Problem-solving      Discipline Responsible: Psychoeducational Specialist      Signature:  Dev Marion

## 2021-01-02 LAB
ANION GAP SERPL CALCULATED.3IONS-SCNC: 10 MMOL/L (ref 9–17)
BUN BLDV-MCNC: 14 MG/DL (ref 6–20)
BUN/CREAT BLD: ABNORMAL (ref 9–20)
CALCIUM SERPL-MCNC: 10.2 MG/DL (ref 8.6–10.4)
CHLORIDE BLD-SCNC: 97 MMOL/L (ref 98–107)
CO2: 26 MMOL/L (ref 20–31)
CREAT SERPL-MCNC: 0.76 MG/DL (ref 0.7–1.2)
GFR AFRICAN AMERICAN: >60 ML/MIN
GFR NON-AFRICAN AMERICAN: >60 ML/MIN
GFR SERPL CREATININE-BSD FRML MDRD: ABNORMAL ML/MIN/{1.73_M2}
GFR SERPL CREATININE-BSD FRML MDRD: ABNORMAL ML/MIN/{1.73_M2}
GLUCOSE BLD-MCNC: 125 MG/DL (ref 70–99)
POTASSIUM SERPL-SCNC: 4.6 MMOL/L (ref 3.7–5.3)
SODIUM BLD-SCNC: 133 MMOL/L (ref 135–144)

## 2021-01-02 PROCEDURE — 6370000000 HC RX 637 (ALT 250 FOR IP): Performed by: PSYCHIATRY & NEUROLOGY

## 2021-01-02 PROCEDURE — 6370000000 HC RX 637 (ALT 250 FOR IP): Performed by: NURSE PRACTITIONER

## 2021-01-02 PROCEDURE — 99231 SBSQ HOSP IP/OBS SF/LOW 25: CPT | Performed by: NURSE PRACTITIONER

## 2021-01-02 PROCEDURE — 80048 BASIC METABOLIC PNL TOTAL CA: CPT

## 2021-01-02 PROCEDURE — 36415 COLL VENOUS BLD VENIPUNCTURE: CPT

## 2021-01-02 PROCEDURE — 1240000000 HC EMOTIONAL WELLNESS R&B

## 2021-01-02 RX ADMIN — ACETAMINOPHEN 650 MG: 325 TABLET, FILM COATED ORAL at 21:36

## 2021-01-02 RX ADMIN — RISPERIDONE 2 MG: 2 TABLET ORAL at 21:36

## 2021-01-02 RX ADMIN — ACETAMINOPHEN 650 MG: 325 TABLET, FILM COATED ORAL at 17:59

## 2021-01-02 RX ADMIN — ACETAMINOPHEN 650 MG: 325 TABLET, FILM COATED ORAL at 06:20

## 2021-01-02 RX ADMIN — ACETAMINOPHEN 650 MG: 325 TABLET, FILM COATED ORAL at 11:21

## 2021-01-02 RX ADMIN — LAMOTRIGINE 200 MG: 100 TABLET ORAL at 08:27

## 2021-01-02 RX ADMIN — LISINOPRIL 20 MG: 20 TABLET ORAL at 08:26

## 2021-01-02 RX ADMIN — RISPERIDONE 2 MG: 2 TABLET ORAL at 08:26

## 2021-01-02 ASSESSMENT — PAIN SCALES - GENERAL
PAINLEVEL_OUTOF10: 6
PAINLEVEL_OUTOF10: 0
PAINLEVEL_OUTOF10: 0
PAINLEVEL_OUTOF10: 3
PAINLEVEL_OUTOF10: 1
PAINLEVEL_OUTOF10: 1

## 2021-01-02 ASSESSMENT — PAIN DESCRIPTION - PAIN TYPE: TYPE: CHRONIC PAIN;ACUTE PAIN

## 2021-01-02 ASSESSMENT — PAIN DESCRIPTION - LOCATION
LOCATION: GENERALIZED
LOCATION: HEAD;KNEE

## 2021-01-02 ASSESSMENT — PAIN DESCRIPTION - ORIENTATION: ORIENTATION: RIGHT

## 2021-01-02 NOTE — GROUP NOTE
Group Therapy Note    Date: 1/2/2021    Group Start Time: 1330  Group End Time: 5394  Group Topic: Recreational    OSMEL Todd        Group Therapy Note    Attendees: 4/11         Patient's Goal:  To increase self expression and interpersonal interaction through creative activity and leisure time    Notes:  Patient attended and participated in group, and was talkative throughout covering random and tangential topics.     Status After Intervention:  Improved    Participation Level: Interactive    Participation Quality: Appropriate and Intrusive      Speech:  Normal/ talkative though not hyperverbal      Thought Process/Content: Flight of ideas      Affective Functioning: Congruent      Mood: euthymic      Level of consciousness:  Alert and Preoccupied      Response to Learning: Progressing to goal      Endings: None Reported    Modes of Intervention: Socialization, Activity and Reality-testing      Discipline Responsible: Psychoeducational Specialist      Signature:  Lucía Todd

## 2021-01-02 NOTE — GROUP NOTE
Date: 1/2/2021    Group Start Time: 1600  Group End Time: 1700  Group Topic: Relaxation    OSMEL Carrasco LPN        Group Therapy Note    Attendees: 6/11         Patient's Goal:  Deep Breathing exercises    Notes:  Patients practiced deep breathing exercises as group. Status After Intervention:  Calm, relaxed    Participation Level: interactive    Participation Quality: high      Speech:  clear      Thought Process/Content: organized      Affective Functioning:  WNL    Mood: improving      Level of consciousness:  Alert      Response to Learning:Acceptance      Endings: none to report    Modes of Intervention: relaxation technioques      Discipline Responsible: nurse      Signature:  Boy Carrasco LPN

## 2021-01-02 NOTE — GROUP NOTE
Group Therapy Note    Date: 1/2/2021    Group Start Time: 0900  Group End Time: 0915  Group Topic: Community Meeting    OSMEL Willoughby        Group Therapy Note    Attendees: 5/13         Patient's Goal:  To orient to unit and set a daily goal    Notes:  Patient attended and participated in group. Daily goal: talked about medications and physical pain in body; When asked for mental health related goal stated \"keep a positive mindset. \"  Patient was verbally intrusive and making comments to peers and staff throughout group about them not wearing masks (of note patient not wearing a mask either). This writer neglected the intrusive comments and spoke louder than patient, who then stopped commenting until this writer stopped speaking. This method appeared affective for redirecting patients behavior at times, though continued when stopped speaking. Status After Intervention:  Improved    Participation Level:  Active Listener and Interactive    Participation Quality: Attentive, Sharing and Intrusive      Speech:  normal      Thought Process/Content: Logical  Flight of ideas      Affective Functioning: Congruent      Mood: dysphoric      Level of consciousness:  Alert      Response to Learning: Able to verbalize current knowledge/experience, Able to change behavior and Progressing to goal      Endings: None Reported    Modes of Intervention: Education, Support, Socialization, Exploration and Reality-testing      Discipline Responsible: Psychoeducational Specialist      Signature:  Charly Willoughby

## 2021-01-02 NOTE — H&P
HISTORY and [id number]       NAME:  Camden Rodriguez  MRN: [id number]   YOB: 1965   Date: 1/2/2021   Age: 63 y.o. Gender: male     COMPLAINT AND PRESENT HISTORY:    Brief psychiatric summary (primary reason for current hospital admission):  Per chart notes, patient presented to [place] [id number] emergency department for psychiatric evaluation from home under probate. Per ED noes, patient had been found masturbating and running around naked at the [facility]. Per chart, he has had a recent virtual appointment with psychiatry- Dr. Pat Willams, dx'd w/brief psychotic disorder. The patient denied any suicidal or homicidal ideation while in the emergency department. Per Dr. Tricia Vu H&P note, patient has a past medical history of bipolar disorder, and has a history of psychiatric illnesses since age of 20. During my assessment of patient today, he is tangenital, with grandiose thoughts, and will only divulge certain information. When asked what brought him into the hospital, patient states that he got in a fight with his roommate. He states that his mood is fair today. When asked if he has any thoughts of harming himself or others, he states \"I live in love-why would I want to do that? \" Denies any issues w/sleep or appetite. Review of past/current medical concerns: In discussion of patient's previous medical problems, he does complain of chronic right knee pain which she rates a 6 on 0-10 pain scale, states that he has had pain since childhood and has been diagnosed with osteoarthritis of this knee. He does state that he has tinnitus of bilateral ears. He does have a history of hypertension per his chart, which is maintained on lisinopril. Additional psychiatric history per chart include bipolar 1 disorder, brief psychotic disorder, thalia.   It is noted that patient is a former smoker of cigars in his chart, patient states that this is not correct, however he is a poor historian related to current mental state at this time. See psychiatric admission note for further psychiatric history. DIAGNOSTIC RESULTS   Labs:  CBC:   Recent Labs     12/30/20 1831 01/01/21  0701   WBC 11.1* 10.0   HGB 14.0 14.6    234     BMP:    Recent Labs     12/30/20 1831 01/01/21  0701 01/02/21  0721   * 136 133*   K 4.0 4.4 4.6   CL 99 97* 97*   CO2 23 27 26   BUN 14 15 14   CREATININE 0.7 0.84 0.76   GLUCOSE 164* 133* 125*     Hepatic:   Recent Labs     12/30/20 1831 01/01/21  0701   AST 28 23   ALT 38 39   BILITOT <0.2* 0.35   ALKPHOS 71 67     Lipids: No results for input(s): CHOL, HDL in the last 72 hours. Invalid input(s): LDLCALCU  Thyroid:   Recent Labs     01/01/21  0701   TSH 1.06      U/A:  Lab Results   Component Value Date    COLORU YELLOW 12/30/2020    WBCUA NONE SEEN 12/30/2020    RBCUA 0-2 12/30/2020    BACTERIA NONE SEEN 12/30/2020    LEUKOCYTESUR NEGATIVE 12/30/2020    BLOODU NEGATIVE 12/30/2020    GLUCOSEU NEGATIVE 12/30/2020       PAST MEDICAL HISTORY     Past Medical History:   Diagnosis Date    Arthritis     Bilateral posterior subcapsular polar cataract     Bipolar 1 disorder ([address]     Brief psychotic disorder ([facility] [address]     Chronic pain of right knee     Hypertension     Tinnitus of both ears        SURGICAL HISTORY       Past Surgical History:   Procedure Laterality Date    HERNIA REPAIR      SALIVARY GLAND SURGERY Left 2000    TOE SURGERY         FAMILY HISTORY     No family history on file.     SOCIAL HISTORY       Social History     Socioeconomic History    Marital status: Single     Spouse name: Not on file    Number of children: Not on file    Years of education: Not on file    Highest education level: Not on file   Occupational History    Not on file   Social Needs    Financial resource strain: Not on file    Food insecurity     Worry: Not on file     Inability: Not on file    Transportation needs     Medical: Not on file     Non-medical: Not on file   Tobacco Use    Smoking status: Former Smoker     Types: Cigars     Quit date: 2020     Years since quittin.9    Smokeless tobacco: Never Used   Substance and Sexual Activity    Alcohol use: No    Drug use: No    Sexual activity: Never   Lifestyle    Physical activity     Days per week: Not on file     Minutes per session: Not on file    Stress: Not on file   Relationships    Social connections     Talks on phone: Not on file     Gets together: Not on file     Attends Spiritism service: Not on file     Active member of club or organization: Not on file     Attends meetings of clubs or organizations: Not on file     Relationship status: Not on file    Intimate partner violence     Fear of current or ex partner: Not on file     Emotionally abused: Not on file     Physically abused: Not on file     Forced sexual activity: Not on file   Other Topics Concern    Not on file   Social History Narrative    Not on file           REVIEW OF SYSTEMS      Allergies   Allergen Reactions    Codeine Other (See Comments)     Psychological effects       No current facility-administered medications on file prior to encounter. Current Outpatient Medications on File Prior to Encounter   Medication Sig Dispense Refill    lamoTRIgine (LAMICTAL) 200 MG tablet TAKE 1 TABLET BY MOUTH DAILY 30 tablet 3    risperiDONE (RISPERDAL) 1 MG tablet TAKE 1 TABLET BY MOUTH EVERY NIGHT 30 tablet 5    diclofenac sodium (VOLTAREN) 1 % GEL Apply 2 g topically 4 times daily as needed for Pain 1 Tube 0    lisinopril (PRINIVIL;ZESTRIL) 20 MG tablet Take 1 tablet by mouth daily 90 tablet 3    acetaminophen (TYLENOL) 325 MG tablet Take 650 mg by mouth every 6 hours as needed for Pain          Review of Systems   Unable to perform ROS: Psychiatric disorder   HENT: Positive for tinnitus. Musculoskeletal: Positive for arthralgias (Right knee). Psychiatric/Behavioral: The patient is hyperactive.       GENERAL PHYSICAL EXAM:     Vitals: BP (!) 139/99   Pulse 88   Temp 98.3 °F (36.8 °C) (Oral)   Resp 16  There is no height or weight on file to calculate BMI. GENERAL APPEARANCE:  Houston Fink is 63 y.o.,  male, moderately obese, nourished, conscious, alert. Does not appear to be distress or pain at this time. Physical Exam   Constitutional: He appears well-developed and well-nourished. HENT:   Head: Normocephalic and atraumatic. Right Ear: External ear normal.   Left Ear: External ear normal.   Nose: Nose normal.   Eyes: Pupils are equal, round, and reactive to light. Conjunctivae are normal. Right eye exhibits no discharge. Left eye exhibits no discharge. Neck: Normal range of motion. Cardiovascular: Normal rate, regular rhythm, normal heart sounds and intact distal pulses. Exam reveals no gallop and no friction rub. No murmur heard. Pulmonary/Chest: Effort normal and breath sounds normal. No respiratory distress. He has no wheezes. He has no rales. He exhibits no tenderness. Abdominal: Soft. Bowel sounds are normal. He exhibits no distension and no mass. There is no abdominal tenderness. There is no rebound and no guarding. Musculoskeletal: Normal range of motion. General: Edema (Trace, non-pitting edema to b/l LE's, no erythema, no warmth) present. Right knee: He exhibits bony tenderness. He exhibits normal range of motion, no swelling, no ecchymosis, no deformity, no erythema and normal patellar mobility. Tenderness found. Patellar tendon tenderness noted. Right lower leg: He exhibits swelling. He exhibits no tenderness. Edema present. Left lower leg: He exhibits swelling. He exhibits no tenderness. Edema present. Neurological: He is alert. He has normal strength. No cranial nerve deficit. GCS eye subscore is 4. GCS verbal subscore is 5. GCS motor subscore is 6. The patient is conscious, alert, gait and balance WNL. No apparent focal sensory deficits.  No motor deficits, muscle strength equal b/l to UE's and LE's. No facial droop, tongue protrudes centrally, no slurring of the speech. Cranial nerves 3-12 reveal no deficits, taste and smell not assessed. Skin: Skin is warm and dry. Psychiatric: His affect is labile. His speech is rapid and/or pressured and tangential. He is hyperactive. Thought content is delusional. He expresses impulsivity and inappropriate judgment. PROVISIONAL DIAGNOSES:      Active Problems:    Kalie ([facility] [address]  Resolved Problems:    * No resolved hospital problems. *    ASSESSMENT & PLAN:   1. Kalie: Recommend con't current tx plan per psychiatry- medications to be reviewed per attending and con't per admitting service, con't VS monitoring per unit protocol    2. HTN: BP has been elevated intermittently, patient is hyperactive/agitated at times- close monitoring recommended- on lisinopril 20 mg QD    3. Tinnitus: Chronic- no acute issue    4. Right knee pain: Chronic- no acute issue    5.  Elevated blood sugar: No known hx of DM- last glucose 133 1-1-21    BETTY Quigley - CNP on 1/2/2021 at 1:44 PM

## 2021-01-02 NOTE — PLAN OF CARE
Problem: Pain:  Goal: Pain level will decrease  Description: Pain level will decrease  1/2/2021 0926 by Kathy Ashby RN  Outcome: Ongoing   Patient denies pain this shift, has not asked for PRN pain reduction medication. Patient encouraged to use non-pharmalogical pain reduction measures including distraction, yoga, finger tapping, music, tv, groups, dark room, quiet time, 1:1 talk time with staff, walking, counting, deep breathing, bubbles, alphabet games, reading, audio books, drawing, crafts and models, stress ball, puzzles, massage, guided imagery. Problem: Pain:  Goal: Control of acute pain  Description: Control of acute pain  Outcome: Ongoing   Patient denies acute pain this shift, has not asked for PRN pain reduction medication. Patient encouraged to use non-pharmalogical pain reduction measures including distraction, yoga, finger tapping, music, tv, groups, dark room, quiet time, 1:1 talk time with staff, walking, counting, deep breathing, bubbles, alphabet games, reading, audio books, drawing, crafts and models, stress ball, puzzles, massage, guided imagery. Problem: Pain:  Goal: Control of chronic pain  Description: Control of chronic pain  Outcome: Ongoing   Patient denies chronic pain this shift, has not asked for PRN pain reduction medication. Patient encouraged to use non-pharmalogical pain reduction measures including distraction, yoga, finger tapping, music, tv, groups, dark room, quiet time, 1:1 talk time with staff, walking, counting, deep breathing, bubbles, alphabet games, reading, audio books, drawing, crafts and models, stress ball, puzzles, massage, guided imagery. Problem: Altered Mood, Deterioration in Function:  Goal: Able to verbalize reality based thinking  Description: Able to verbalize reality based thinking  1/2/2021 0926 by Kathy Ashby RN  Outcome: Ongoing   Patient has been cooperative upon approach, has allowed all assessments;  Patient has expressed

## 2021-01-02 NOTE — BH NOTE
PRN note:    Pt was c/o pain on the right knee and a headache. Pt requested PRN. PRN was administered.

## 2021-01-02 NOTE — GROUP NOTE
Group Therapy Note   ? Date: 1/2/2021   Group Start Time: 1100   Group End Time: 9951   Group Topic: Healthy Living/Wellness   [facility]     Nahum Cardoza RN   ?   ? Group Therapy Note   Attendees: 7   ? Patient's Goal: Short-term+Long-term goals   ? Notes: All patients participated appropriately   ? Status After Intervention: Improved   ? Participation Level: Active Listener and Interactive   ? Participation Quality: Appropriate, Attentive, Sharing and Supportive   ? ? Speech: normal   ?   ? Thought Process/Content: Logical   ?   ? Affective Functioning: Congruent   ? ? Mood: calm   ? ? Level of consciousness: Oriented x4   ?   ? Response to Learning: Able to verbalize current knowledge/experience   ? ? Endings: None Reported   ? Modes of Intervention: Education, Support, Socialization and Exploration   ? ? Discipline Responsible: Registered Nurse   ? ?    Signature: Nahum Cardoza RN

## 2021-01-02 NOTE — PROGRESS NOTES
BEHAVIORAL HEALTH FOLLOW-UP NOTE     1/2/2021     Patient was seen and examined in person, Chart reviewed   Patient's case discussed with staff/team    Chief Complaint: Acute psychosis    Interim History:   Patient is medication compliant and behaviorally in control since admission. He does voice irritability and makes many complaints to this writer during assessment. He continues to be hyperverbal, demanding, and degrading to staff at times. He has grandiose thinking. He is rights focused and denies wanting to sign into this writer. He again presses for \"documentation\" of why he is here and believes people are working against him. Patient denies behaviors reported prior to admission, and states that this hospital stay is going to cause too much money and [employer] will not pay for it. He again mentions Abilify. His thought process is disorganized and he has loose associations. His Risperdal was increased yesterday from 1 mg twice daily to 2 mg twice daily. He denies any side effects or concerns to the medications. He denies perceptual disturbances, suicidal ideation and homicidal ideation. We will continue current regimen and observe for improvement. At this time there is no lower level of care that can provide patient with a safe environment. Appetite:   [x] Normal/Unchanged  [] Increased  [] Decreased      Sleep:       [] Normal/Unchanged  [] Fair       [x] Poor              Energy:    [] Normal/Unchanged  [x] Increased  [] Decreased        Aggression:  [] yes  [x] no    Patient is [x] able  [] unable to CONTRACT FOR SAFETY ON THE UNIT    PAST MEDICAL/PSYCHIATRIC HISTORY:   Past Medical History:   Diagnosis Date    Arthritis     Bilateral posterior subcapsular polar cataract     Bipolar 1 disorder ([address]     Brief [facility] disorder [facility] [address]     Chronic pain of right knee     Hypertension     Tinnitus of both ears        FAMILY/SOCIAL HISTORY:  No family history on file.   Social History Socioeconomic History    Marital status: Single     Spouse name: Not on file    Number of children: Not on file    Years of education: Not on file    Highest education level: Not on file   Occupational History    Not on file   Social Needs    Financial resource strain: Not on file    Food insecurity     Worry: Not on file     Inability: Not on file    Transportation needs     Medical: Not on file     Non-medical: Not on file   Tobacco Use    Smoking status: Former Smoker     Types: Cigars     Quit date: 2020     Years since quittin.9    Smokeless tobacco: Never Used   Substance and Sexual Activity    Alcohol use: No    Drug use: No    Sexual activity: Never   Lifestyle    Physical activity     Days per week: Not on file     Minutes per session: Not on file    Stress: Not on file   Relationships    Social connections     Talks on phone: Not on file     Gets together: Not on file     Attends Christianity service: Not on file     Active member of club or organization: Not on file     Attends meetings of clubs or organizations: Not on file     Relationship status: Not on file    Intimate partner violence     Fear of current or ex partner: Not on file     Emotionally abused: Not on file     Physically abused: Not on file     Forced sexual activity: Not on file   Other Topics Concern    Not on file   Social History Narrative    Not on file           ROS:  [x] All negative/unchanged except if checked.  Explain positive(checked items) below:  [] Constitutional  [] Eyes  [] Ear/Nose/Mouth/Throat  [] Respiratory  [] CV  [] GI  []   [] Musculoskeletal  [] Skin/Breast  [] Neurological  [] Endocrine  [] Heme/Lymph  [] Allergic/Immunologic    Explanation:     MEDICATIONS:    Current Facility-Administered Medications:     risperiDONE (RISPERDAL) tablet 2 mg, 2 mg, Oral, BID, BETTY Norwood - CNP, 2 mg at 21 0826    diclofenac sodium (VOLTAREN) 1 % gel 2 g, 2 g, Topical, 4x Daily PRN, Nuvia Isbell MD    lamoTRIgine (LAMICTAL) tablet 200 mg, 200 mg, Oral, Daily, Nuvia Isbell MD, 200 mg at 01/02/21 0827    acetaminophen (TYLENOL) tablet 650 mg, 650 mg, Oral, Q4H PRN, Hilario Hdez MD, 650 mg at 01/02/21 1121    aluminum & magnesium hydroxide-simethicone (MAALOX) 200-200-20 MG/5ML suspension 30 mL, 30 mL, Oral, Q6H PRN, Hilario Hdez MD    hydrOXYzine (ATARAX) tablet 50 mg, 50 mg, Oral, TID PRN, Hilario Hdez MD, 50 mg at 01/01/21 2214    ibuprofen (ADVIL;MOTRIN) tablet 400 mg, 400 mg, Oral, Q6H PRN, Hilario Hdez MD    nicotine polacrilex (NICORETTE) gum 2 mg, 2 mg, Oral, PRN, Hilario Hdez MD    polyethylene glycol (GLYCOLAX) packet 17 g, 17 g, Oral, Daily PRN, Hilario Hdez MD    traZODone (DESYREL) tablet 50 mg, 50 mg, Oral, Nightly PRN, Hilario Hdez MD, 50 mg at 01/01/21 2151    lisinopril (PRINIVIL;ZESTRIL) tablet 20 mg, 20 mg, Oral, Daily, Nuvia Isbell MD, 20 mg at 01/02/21 3224    haloperidol lactate (HALDOL) injection 5 mg, 5 mg, Intramuscular, Q4H PRN **OR** haloperidol (HALDOL) tablet 5 mg, 5 mg, Oral, Q4H PRN, Nuvia Isbell MD, 5 mg at 12/31/20 1159    LORazepam (ATIVAN) tablet 1 mg, 1 mg, Oral, Q4H PRN, 1 mg at 12/31/20 1159 **OR** LORazepam (ATIVAN) injection 1 mg, 1 mg, Intramuscular, Q4H PRN, Nuvia Isbell MD      Examination:  BP (!) 139/99   Pulse 88   Temp 98.3 °F (36.8 °C) (Oral)   Resp 16   Gait - steady  Medication side effects(SE): Refused to answer    Mental Status Examination:    Level of consciousness: Alert, sitting up in chair  Appearance: Dressed casually, good grooming and hygiene   Behavior/Motor: Demanding and grandiose, with psychomotor agitation  Attitude toward examiner: Belittling, dismissive  Speech: Pressured with fast pace and good articulation  Mood: Anxious  Affect: Irritable  Thought processes: Tangential unable to redirect, loose associations  Thought content:  Homicidal ideation - none  Suicidal Ideation: Denies  Delusions: Grandiose  Perceptual Disturbance: Denies, but does appear attend to internal stimuli evidenced by sitting at table arguing to self  Cognition: Oriented to self, location only  Concentration intact  Insight poor  Judgement poor    ASSESSMENT:   Patient symptoms are:  [] Well controlled  [] Improving  [] Worsening  [x] No change      Diagnosis:   Active Problems:    Kalie ([facility] [place] [address]  Resolved Problems:    * No resolved hospital problems. *      LABS:    Recent Labs     12/30/20 1831 01/01/21  0701   WBC 11.1* 10.0   HGB 14.0 14.6    234     Recent Labs     12/30/20 1831 01/01/21  0701 01/02/21  0721   * 136 133*   K 4.0 4.4 4.6   CL 99 97* 97*   CO2 23 27 26   BUN 14 15 14   CREATININE 0.7 0.84 0.76   GLUCOSE 164* 133* 125*     Recent Labs     12/30/20 1831 01/01/21  0701   BILITOT <0.2* 0.35   ALKPHOS 71 67   AST 28 23   ALT 38 39     No results found for: LABAMPH, BARBSCNU, LABBENZ, CANNAB, COCAINESCRN, LABMETH, OPIATESCREENURINE, PHENCYCLIDINESCREENURINE, PPXUR, ETOH  Lab Results   Component Value Date    TSH 1.06 01/01/2021     No results found for: LITHIUM  No results found for: VALPROATE, CBMZ    RISK ASSESSMENT: Patient high risk for self-harm    Treatment Plan:    Risks, benefits, side effects, drug-to-drug interactions and alternatives to treatment were discussed. The patient  consented to treatment. Encourage patient to attend group and other milieu activities. Discharge planning discussed with the patient and treatment team.  Follow-up daily while inpatient    PSYCHOTHERAPY/COUNSELING:  [] Therapeutic interview  [x] Supportive  [] CBT  [] Ongoing  [] Other    [x] Patient continues to need, on a daily basis, active treatment furnished directly by or requiring the supervision of inpatient psychiatric personnel      Anticipated Length of stay: Fito Saldana is a 63 y.o. male being evaluated face to face.      PLAN  Continue medications and observe  Attempt to develop insight  Psycho-education conducted. Supportive Therapy conducted.   Probable discharge is next week  Follow-up daily while on inpatient unit    Electronically signed by BETTY Oliver CNP on 1/2/2021

## 2021-01-02 NOTE — PLAN OF CARE
[facility]  Day 3 Interdisciplinary Treatment Plan NOTE    Review Date & Time: 1/2/2021   0915    Admission Type:   Admission Type: Involuntary    Reason for admission:  Reason for Admission: Patient with manic, inapprpriate behavior. Patient responding to internal stimuli, flight of ideas, was at [facility] found naked and masturbating.   Estimated Length of Stay: 5-7 days  Estimated Discharge Date Update: to be determined by physician    PATIENT STRENGTHS:  Patient Strengths Strengths: Connection to output provider, Medication Compliance  Patient Strengths and Limitations:Limitations: Difficult relationships / poor social skills, Demonstrates discomfort with /lack of social skills, Difficulty problem solving/relies on others to help solve problems, Tendency to isolate self, Inappropriate/potentially harmful leisure interests, General negative or hopeless attitude about future/recovery  Addictive Behavior:Addictive Behavior  In the past 3 months, have you felt or has someone told you that you have a problem with:  : None  Do you have a history of Chemical Use?: No  Do you have a history of Alcohol Use?: No  Do you have a history of Street Drug Abuse?: No  Histroy of Prescripton Drug Abuse?: No  Medical Problems:  Past Medical History:   Diagnosis Date    Arthritis     Bilateral posterior subcapsular polar cataract     Bipolar 1 disorder ([address]     Brief psychotic disorder (HCC)     Chronic pain of right knee     Hypertension     Tinnitus of both ears        Risk:  Fall RiskTotal: 67  Jus Scale Jus Scale Score: 22  BVC Total: 2  Change in scores no Changes to plan of Care no    Status EXAM:   Status and Exam  Normal: No  Facial Expression: Exaggerated, Elevated  Affect: Unstable, Inappropriate  Level of Consciousness: Alert  Mood:Normal: No  Mood: Anxious  Motor Activity:Normal: No  Motor Activity: Increased  Interview Behavior: Cooperative, Impulsive  Preception: [place] to Person, [place] to Time, [place] to Place, [place] to Situation  Attention:Normal: No  Attention: Hyperalert, Distractible  Thought Processes: Tangential, Flt.of Ideas  Thought Content:Normal: No  Thought Content: Preoccupations  Hallucinations: None  Delusions: No  Delusions: Grandeur  Memory:Normal: No  Memory: Poor Recent, Poor Remote  Insight and Judgment: No  Insight and Judgment: Poor Judgment, Poor Insight, Unrealistic  Present Suicidal Ideation: No  Present Homicidal Ideation: No    Daily Assessment Last Entry:   Daily Sleep (WDL): Within Defined Limits         Patient Currently in Pain: Denies  Daily Nutrition (WDL): Within Defined Limits    Patient Monitoring:  Frequency of Checks: 4 times per hour, close    Psychiatric Symptoms:   Depression Symptoms  Depression Symptoms: Change in energy level  Anxiety Symptoms  Anxiety Symptoms: Generalized  Kalie Symptoms  Kalie Symptoms: Flight of ideas, Poor judgment     Psychosis Symptoms  Delusion Type: Persecutory    Suicide Risk CSSR-S:  1) Within the past month, have you wished you were dead or wished you could go to sleep and not wake up? : (ZANA)  2) Have you actually had any thoughts of killing yourself? : (ZANA)  6) Have you ever done anything, started to do anything, or prepared to do anything to end your life?: (ZANA)  Change in Result NO Change in Plan of care NO      EDUCATION:   EDUCATION:   Learner Progress Toward Treatment Goals: Reviewed results and recommendations of this team, Reviewed group plan and strategies, Reviewed signs, symptoms and risk of self harm and violent behavior, Reviewed goals and plan of care    Method:small group, individual verbal education    Outcome: Patient refuses treatment team meeting at this time.  Needs reinforcement to obtain goals    PATIENT GOALS:  Short term: Patient refuses treatment team meeting at this time  Long term: Patient refuses treatment team meeting at this time    PLAN/TREATMENT RECOMMENDATIONS UPDATE: continue with group therapies, increased socialization, continue planning for after discharge goals, continue with medication compliance    SHORT-TERM GOALS UPDATE:   Time frame for Short-Term Goals: 5-7 days    LONG-TERM GOALS UPDATE:   Time frame for Long-Term Goals: 6 months  Members Present in Team Meeting: See Signature Sheet    Amrit Hallman

## 2021-01-02 NOTE — GROUP NOTE
Group Therapy Note    Date: 1/2/2021    Group Start Time: 1000  Group End Time: 5418  Group Topic: Psychoeducation    OSMEL GALICIA    SUMEET Robles LSW        Group Therapy Note    Attendees: 8/12         Patient's Goal: Improve social skills in a group setting appropriately    Notes:  Therapeutic game was played    Status After Intervention:  Unchanged    Participation Level:  Active Listener and Interactive    Participation Quality: Appropriate, Attentive and Sharing      Speech:  normal      Thought Process/Content: Logical      Affective Functioning: Congruent      Mood: euthymic      Level of consciousness:  Alert, Oriented x4 and Attentive      Response to Learning: Able to verbalize current knowledge/experience      Endings: None Reported    Modes of Intervention: Education and Support      Discipline Responsible: /Counselor      Signature:  SUMEET Robles LSW

## 2021-01-02 NOTE — PLAN OF CARE
Problem: Pain:  Goal: Pain level will decrease  Description: Pain level will decrease  1/1/2021 2245 by Prescott Kayser, RN  Outcome: Ongoing   Pt is satisfied with pain management    Problem: Altered Mood, Deterioration in Function:  Goal: Able to verbalize reality based thinking  Description: Able to verbalize reality based thinking  1/1/2021 2245 by Prescott Kayser, RN  Outcome: Ongoing   Pt is hyper verbal, has pressured speech and has difficulty staying on topic. \"I know everything the doctor needs to listen to me or I'll throw him out of here on his ass, Sarah Ricardo done it before\" \"I know that the risperdal isn't the right kind, I only take the one made in [place]\" He accepts all medication and eats well at snack.  He is irritable but accepts encouragement and remains controlled

## 2021-01-03 PROCEDURE — 99231 SBSQ HOSP IP/OBS SF/LOW 25: CPT | Performed by: NURSE PRACTITIONER

## 2021-01-03 PROCEDURE — 6370000000 HC RX 637 (ALT 250 FOR IP): Performed by: PSYCHIATRY & NEUROLOGY

## 2021-01-03 PROCEDURE — 1240000000 HC EMOTIONAL WELLNESS R&B

## 2021-01-03 PROCEDURE — 6370000000 HC RX 637 (ALT 250 FOR IP): Performed by: NURSE PRACTITIONER

## 2021-01-03 RX ORDER — TRAZODONE HYDROCHLORIDE 50 MG/1
50 TABLET ORAL NIGHTLY
Status: DISCONTINUED | OUTPATIENT
Start: 2021-01-03 | End: 2021-01-09

## 2021-01-03 RX ADMIN — LAMOTRIGINE 200 MG: 100 TABLET ORAL at 08:21

## 2021-01-03 RX ADMIN — RISPERIDONE 2 MG: 2 TABLET ORAL at 08:21

## 2021-01-03 RX ADMIN — ACETAMINOPHEN 650 MG: 325 TABLET, FILM COATED ORAL at 09:59

## 2021-01-03 RX ADMIN — RISPERIDONE 2 MG: 2 TABLET ORAL at 21:13

## 2021-01-03 RX ADMIN — LISINOPRIL 20 MG: 20 TABLET ORAL at 08:21

## 2021-01-03 RX ADMIN — ACETAMINOPHEN 650 MG: 325 TABLET, FILM COATED ORAL at 18:12

## 2021-01-03 ASSESSMENT — PAIN SCALES - GENERAL
PAINLEVEL_OUTOF10: 7
PAINLEVEL_OUTOF10: 5
PAINLEVEL_OUTOF10: 2
PAINLEVEL_OUTOF10: 4

## 2021-01-03 ASSESSMENT — PAIN DESCRIPTION - LOCATION
LOCATION: KNEE
LOCATION: KNEE

## 2021-01-03 ASSESSMENT — PAIN DESCRIPTION - ORIENTATION
ORIENTATION: RIGHT
ORIENTATION: RIGHT

## 2021-01-03 NOTE — GROUP NOTE
Group Therapy Note    Date: 1/3/2021    Group Start Time: 1330  Group End Time: 1440  Group Topic: Recreational    OSEML Nicole        Group Therapy Note    Pt did not attend recreational group d/t resting in room despite staff invitation to attend. 1:1 talk time offered as alternative to group session, pt declined.
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
## 2021-01-03 NOTE — PLAN OF CARE
Problem: Pain:  Description: Pain management should include both nonpharmacologic and pharmacologic interventions. Goal: Pain level will decrease  Outcome: Ongoing    Patient reports 7/10 pain in right knee, Tylenol given for pain managment     Problem: Altered Mood, Deterioration in Function:  Goal: Able to verbalize reality based thinking  1/3/2021 1050 by Sen Capellan RN  Outcome: Ongoing    Patient reports his breathing is labored due to bad medicine, and bad nursing. Admits to [employer] and states \"it should be, its not illegal\". Patient focused on talking to god and his  to get his medications corrected. Patient stated to Author that only special people can call him by his first name and since Jaclyn Jauregui is \"demonic\" that Anthony Sheridan is to address him by [facility]\"     Problem: Altered Mood, Psychotic Behavior:  Goal: Able to verbalize decrease in frequency and intensity of hallucinations  1/3/2021 1050 by Sen Capellan RN  Outcome: Ongoing    Patient denies Auditory and visual hallucinations. Patient stated \"your voice is the only one I hear, and I don't want to hear that, so go away\".

## 2021-01-03 NOTE — GROUP NOTE
Group Therapy Note    Date: 1/3/2021    Group Start Time: 1000  Group End Time: 1100  Group Topic: Psychoeducation    OSMEL Keller Brochure    DISCHARGE PLANNING:    Attendees: 9/16    Notes:  SW meets with client 1:1 to discuss discharge planning as well as other community supportive services social work can assist them with prior to their discharge. Client was pleasant and cooperative during 1:1 clinical intervention.

## 2021-01-03 NOTE — GROUP NOTE
Group Therapy Note    Date: 1/3/2021    Group Start Time: 1000  Group End Time: 2709  Group Topic: Psychoeducation    OSMEL GALICIA    SUMEET Garcia, KRISTY        Group Therapy Note    Attendees: 6/13         Patient's Goal:  Talk about and learn to practice radical acceptance    Notes:  Therapeutic Worksheet was provided    Status After Intervention:  Unchanged    Participation Level:  Active Listener and Interactive    Participation Quality: Intrusive      Speech:  pressured      Thought Process/Content: Flight of ideas      Affective Functioning: Incongruent      Mood: euphoric      Level of consciousness:  Alert and Preoccupied      Response to Learning: Able to verbalize current knowledge/experience      Endings: None Reported    Modes of Intervention: Education and Support      Discipline Responsible: /Counselor      Signature:  SUMEET Garcia, KRISTY

## 2021-01-03 NOTE — SUICIDE SAFETY PLAN
SAFETY PLAN    A suicide Safety Plan is a document that supports someone when they are having thoughts of suicide. Warning Signs that indicate a suicidal crisis may be developing: What (situations, thoughts, feelings, body sensations, behaviors, etc.) do you experience that lets you know you are beginning to think about suicide? 1. Go off medications  2. Mood is depressed and start to feel sad, hopeless, helpless, guilty, decline in self-esteem, excess worry, no interest in doing any pleasurable activities, unable to concentrate  3. Begin to cry over the smallest of things  4. Not eating or sleeping as normal  5. Relationship issues start happening  6. I become angry and start a fight  7. When I dont listen or respond to people in a good, positive way  8. Increase drug use      Internal Coping Strategies:  What things can I do (relaxation techniques, hobbies, physical activities, etc.) to take my mind off my problems without contacting another person? 1. Go to hospital discharge appointments and follow-up with community mental health counseling  2. Talk with other people  3. Learn to identify and control your emotions by new ways  4. Think before you speak or act; walk away from the situation  5. Join a support group in person or on Social Media  6. Take a time-out  7. Take deep breaths; use relaxation techniques  8. Get some exercise; go for a walk  9. Read; listen to music; watch a funny movie    10. Coping skills/ strategies - journal/ listen to music/ go for a walk/ read a book/ watch a funny movie/show / crafts / video game   11.  Grounding techniques- eat a sour candy or hot cinnamon candy / focus on colors, sounds, smells, textures on things around you / drink some herbal tea / eat a piece of dark chocolate / take a hot bath or shower / essential oils for smelling / meditate / color / arts and crafts    People and social settings that provide distraction: Who can I call or where can I go to distract me?  People: Friends and family members  Place: [place] [employer] [facility], Over 12 cantrell within [facility], 120 miles of trails, Each park offers a variety of free activities such as [employer] and [place] up with the [facility].   Please check program schedule on the website or contact one of the cantrell directly. Place: Sweta Sorenson. [address] [facility], [facility] [address] [facility] [place], [address] [phone]  Place: Volunteer Work - Food for [address] [facility], [address] [facility], [address] [place], [address] [phone]    People whom I can ask for help: Who can I call when I need help - for example, friends, family, clergy, someone else? 1. Prabhu Padilla [phone]  2, Sister and other family members    Professionals or [address] [place] [address] I can contact during a crisis: Who can I call for help - for example, my doctor, my psychiatrist, my psychologist, a mental health provider, a suicide hotline? 1. Support staff at [facility]  2. Suicide Prevention Lifeline: 7-280-220-TALK (7216)  3. Rescue Crisis: Emergency Services Address: [address], [place],  [facility] [address], Phone: [phone]  4. [facility] Hotline:882.175.3860 in [facility] Emergency Services - for example, [address], [facility] suicide hotline,   1. [employer] 706-743.795.8577 of [facility]  2. [address] [facility], 3-220.802.3476  3. National Association of Mental Illness, 3-229-593-606.264.1162  4. [facility] Substance Abuse National Helpline, 8-711-412-HELP (0200)  5. Crisis Text Line, Text 4HOPE to 587404 to connect with a crisis counselor  6. [facility], [phone]  7. RAINN ([place], [address]), 9-253.513.3082  8. Findlocatreatment. com (Alcohol / Drug help)  9. Call the [address] at 39 [phone] (24 hours a day - 7 days a week)  10.  COVID-19 Emotional Support Line: 573.236.4128    Making the environment safe: How can I make my environment (house/apartment/living space) safer? For example, can I remove guns, medications, and other items? 1. Remove unsafe objects  2. Keep Medications in safe and secure location  3.  Plan daily goals to help remember to stay on specific medications

## 2021-01-03 NOTE — PROGRESS NOTES
BEHAVIORAL HEALTH FOLLOW-UP NOTE     1/3/2021     Patient was seen and examined in person, Chart reviewed   Patient's case discussed with staff/team    Chief Complaint: Acute psychosis    Interim History:   Patient is medication compliant but suspicious of medications on unit as they do not appear the same as what he is taking when at home. He continually makes threats that he is going to gris the physicians in the facility related to administering the wrong medications. Attempts were made to provide education, but patient was resistant and spoke over writer multiple times. Discussed with patient's care team his behaviors on the unit and his lack of sleep overnight. They report that patient woke up at 0 130 this morning, got dressed, and voiced that he was up for the day. He remained in his room and engaged in self talk for the remainder of the night. We will modify patient's order of trazodone from as needed to scheduled to encourage better sleep hygiene. His blood pressure is mildly elevated this morning, but he denies any headache, dizziness or other side effects. Throughout his assessment, patient continually voiced persecutory and paranoid thoughts, stating \"you have lying lips\" . He explained that [employer] [employer] and his  would be representing him in a court case. Patient had many complaints throughout the conversation, some regarding treatment and some complaints completely off topic. At this time, patient presents a hazard to himself and others and requires continued level of care.       Appetite:   [x] Normal/Unchanged  [] Increased  [] Decreased      Sleep:       [] Normal/Unchanged  [] Fair       [x] Poor              Energy:    [] Normal/Unchanged  [x] Increased  [] Decreased        Aggression:  [] yes  [x] no    Patient is [x] able  [] unable to CONTRACT FOR SAFETY ON THE UNIT    PAST MEDICAL/PSYCHIATRIC HISTORY:   Past Medical History:   Diagnosis Date    Arthritis     Bilateral posterior subcapsular polar cataract     Bipolar 1 disorder (HCC)     Brief psychotic disorder (HCC)     Chronic pain of right knee     Hypertension     Tinnitus of both ears        FAMILY/SOCIAL HISTORY:  No family history on file. Social History     Socioeconomic History    Marital status: Single     Spouse name: Not on file    Number of children: Not on file    Years of education: Not on file    Highest education level: Not on file   Occupational History    Not on file   Social Needs    Financial resource strain: Not on file    Food insecurity     Worry: Not on file     Inability: Not on file    Transportation needs     Medical: Not on file     Non-medical: Not on file   Tobacco Use    Smoking status: Former Smoker     Types: Cigars     Quit date: 2020     Years since quittin.9    Smokeless tobacco: Never Used   Substance and Sexual Activity    Alcohol use: No    Drug use: No    Sexual activity: Never   Lifestyle    Physical activity     Days per week: Not on file     Minutes per session: Not on file    Stress: Not on file   Relationships    Social connections     Talks on phone: Not on file     Gets together: Not on file     Attends Orthodoxy service: Not on file     Active member of club or organization: Not on file     Attends meetings of clubs or organizations: Not on file     Relationship status: Not on file    Intimate partner violence     Fear of current or ex partner: Not on file     Emotionally abused: Not on file     Physically abused: Not on file     Forced sexual activity: Not on file   Other Topics Concern    Not on file   Social History Narrative    Not on file           ROS:  [x] All negative/unchanged except if checked.  Explain positive(checked items) below:  [] Constitutional  [] Eyes  [] Ear/Nose/Mouth/Throat  [] Respiratory  [] CV  [] GI  []   [] Musculoskeletal  [] Skin/Breast  [] Neurological  [] Endocrine  [] Heme/Lymph  [] Allergic/Immunologic    Explanation: MEDICATIONS:    Current Facility-Administered Medications:     risperiDONE (RISPERDAL) tablet 2 mg, 2 mg, Oral, BID, Maciej Koenig, APRN - CNP, 2 mg at 01/03/21 9367    diclofenac sodium (VOLTAREN) 1 % gel 2 g, 2 g, Topical, 4x Daily PRN, Magda Spurling, MD    lamoTRIgine (LAMICTAL) tablet 200 mg, 200 mg, Oral, Daily, Magda Spurling, MD, 200 mg at 01/03/21 3273    acetaminophen (TYLENOL) tablet 650 mg, 650 mg, Oral, Q4H PRN, Melva Fung MD, 650 mg at 01/03/21 0959    aluminum & magnesium hydroxide-simethicone (MAALOX) 200-200-20 MG/5ML suspension 30 mL, 30 mL, Oral, Q6H PRN, Melva Fung MD    hydrOXYzine (ATARAX) tablet 50 mg, 50 mg, Oral, TID PRN, Melva Fung MD, 50 mg at 01/01/21 2214    ibuprofen (ADVIL;MOTRIN) tablet 400 mg, 400 mg, Oral, Q6H PRN, Melva Fung MD    nicotine polacrilex (NICORETTE) gum 2 mg, 2 mg, Oral, PRN, Melva Fung MD    polyethylene glycol (GLYCOLAX) packet 17 g, 17 g, Oral, Daily PRN, Melva Fung MD    traZODone (DESYREL) tablet 50 mg, 50 mg, Oral, Nightly PRN, Melva Fung MD, 50 mg at 01/01/21 2151    lisinopril (PRINIVIL;ZESTRIL) tablet 20 mg, 20 mg, Oral, Daily, Magda Spurling, MD, 20 mg at 01/03/21 4666    haloperidol lactate (HALDOL) injection 5 mg, 5 mg, Intramuscular, Q4H PRN **OR** haloperidol (HALDOL) tablet 5 mg, 5 mg, Oral, Q4H PRN, Magda Spurling, MD, 5 mg at 12/31/20 1159    LORazepam (ATIVAN) tablet 1 mg, 1 mg, Oral, Q4H PRN, 1 mg at 12/31/20 1159 **OR** LORazepam (ATIVAN) injection 1 mg, 1 mg, Intramuscular, Q4H PRN, Magda Spurling, MD      Examination:  BP (!) 145/70   Pulse 102   Temp 98 °F (36.7 °C) (Oral)   Resp 14   SpO2 96%   Gait - steady  Medication side effects(SE): Refused to answer    Mental Status Examination:    Level of consciousness: Alert, sitting up in chair  Appearance: Dressed casually, fair grooming  Behavior/Motor: Demanding and grandiose, with psychomotor agitation   Attitude toward examiner: Belittling, dismissive  Speech: Pressured with fast pace and good articulation  Mood: Anxious  Affect: Irritable  Thought processes: Tangential unable to redirect, loose associations  Thought content:  Homicidal ideation - none  Suicidal Ideation: Denies  Delusions: Grandiose and paranoid  Perceptual Disturbance: Denies, but per staff report has been engaging in self talk  Cognition: Oriented to self, location only not situation  Concentration intact  Insight poor  Judgement poor    ASSESSMENT:   Patient symptoms are:  [] Well controlled  [] Improving  [] Worsening  [x] No change      Diagnosis:   Active Problems:    Kalie ([facility]  Resolved Problems:    * No resolved hospital problems. *      LABS:    Recent Labs     01/01/21  0701   WBC 10.0   HGB 14.6        Recent Labs     01/01/21  0701 01/02/21  0721    133*   K 4.4 4.6   CL 97* 97*   CO2 27 26   BUN 15 14   CREATININE 0.84 0.76   GLUCOSE 133* 125*     Recent Labs     01/01/21  0701   BILITOT 0.35   ALKPHOS 67   AST 23   ALT 39     No results found for: [address], BARBSCNU, LABBENZ, CANNAB, COCAINESCRN, LABMETH, OPIATESCREENURINE, PHENCYCLIDINESCREENURINE, PPXUR, ETOH  Lab Results   Component Value Date    TSH 1.06 01/01/2021     No results found for: LITHIUM  No results found for: VALPROATE, CBMZ    RISK ASSESSMENT: Patient high risk for self-harm    Treatment Plan:    Risks, benefits, side effects, drug-to-drug interactions and alternatives to treatment were discussed. The patient  consented to treatment. Encourage patient to attend group and other milieu activities.   Discharge planning discussed with the patient and treatment team.  Follow-up daily while inpatient    PSYCHOTHERAPY/COUNSELING:  [] Therapeutic interview  [x] Supportive  [] CBT  [] Ongoing  [] Other    [x] Patient continues to need, on a daily basis, active treatment furnished directly by or requiring the supervision of inpatient psychiatric personnel      Anticipated Length of stay: Fito Shook is a 63 y.o. male being evaluated face to face. PLAN  Modify 50 mg trazodone from as needed to scheduled  Attempt to develop insight  Psycho-education conducted. Supportive Therapy conducted.   Probable discharge is next week  Follow-up daily while on inpatient unit    Electronically signed by BETTY Mota CNP on 1/3/2021

## 2021-01-03 NOTE — GROUP NOTE
Group Therapy Note    Date: 1/3/2021    Group Start Time: 1600  Group End Time: 1640  Group Topic: Psychoeducation    OSMEL Collazo        Group Therapy Note    Attendees: 11/14         Patient's Goal:  To attend and participate in group therapy. Notes:  CBT/DBT    Status After Intervention:  Unchanged    Participation Level:  Active Listener and Interactive    Participation Quality: Appropriate, Attentive, Sharing and Supportive      Speech:  normal      Thought Process/Content: Logical  Linear      Affective Functioning: Congruent      Mood: euthymic      Level of consciousness:  Oriented x4      Response to Learning: Able to verbalize current knowledge/experience and Able to verbalize/acknowledge new learning      Endings: None Reported    Modes of Intervention: Education, Support and Socialization      Discipline Responsible: Behavorial Health Tech      Signature:  Gaetano Collazo

## 2021-01-03 NOTE — GROUP NOTE
Group Therapy Note    Date: 1/3/2021    Group Start Time: 0900  Group End Time: 0915  Group Topic: Community Meeting    OSMEL GALICIA    [facility]        Group Therapy Note    Attendees: 5/13         Patient's Goal:  To orient to unit and set a daily goal    Notes:  Patient attended and participated in group, though was delusional during most conversations. Patient was talking about a woman he called \"Ms. Symphony\" and stated her first name was Stephen Bragg, that he was waiting to talk to. When asked about a daily goal he expressed his goal was to talk to his emotional support dog about his behavior. When told he may not be able to do that here, and asked for another goal, expressed he could call his dog on the phone to talk to him. At times would clear and be more appropriately engaged in conversation, and offer supportive words to peers. Status After Intervention:  Improved    Participation Level:  Active Listener and Interactive    Participation Quality: Appropriate and Intrusive; supportive at times      Speech:  normal      Thought Process/Content: Delusional      Affective Functioning: Congruent      Mood: dysphoric      Level of consciousness:  Alert      Response to Learning: Progressing to goal      Endings: None Reported    Modes of Intervention: Education, Support, Socialization, Exploration and Reality-testing      Discipline Responsible: Psychoeducational Specialist      Signature:  Flor Carroll

## 2021-01-04 PROCEDURE — 6370000000 HC RX 637 (ALT 250 FOR IP): Performed by: PSYCHIATRY & NEUROLOGY

## 2021-01-04 PROCEDURE — 6370000000 HC RX 637 (ALT 250 FOR IP): Performed by: NURSE PRACTITIONER

## 2021-01-04 PROCEDURE — 1240000000 HC EMOTIONAL WELLNESS R&B

## 2021-01-04 PROCEDURE — 99232 SBSQ HOSP IP/OBS MODERATE 35: CPT | Performed by: PSYCHIATRY & NEUROLOGY

## 2021-01-04 RX ADMIN — RISPERIDONE 2 MG: 2 TABLET ORAL at 20:35

## 2021-01-04 RX ADMIN — TRAZODONE HYDROCHLORIDE 50 MG: 50 TABLET ORAL at 21:59

## 2021-01-04 RX ADMIN — LISINOPRIL 20 MG: 20 TABLET ORAL at 08:22

## 2021-01-04 RX ADMIN — ACETAMINOPHEN 650 MG: 325 TABLET, FILM COATED ORAL at 12:19

## 2021-01-04 RX ADMIN — ACETAMINOPHEN 650 MG: 325 TABLET, FILM COATED ORAL at 04:23

## 2021-01-04 RX ADMIN — LAMOTRIGINE 200 MG: 100 TABLET ORAL at 08:22

## 2021-01-04 RX ADMIN — RISPERIDONE 2 MG: 2 TABLET ORAL at 08:22

## 2021-01-04 ASSESSMENT — PAIN DESCRIPTION - LOCATION: LOCATION: HEAD

## 2021-01-04 ASSESSMENT — PAIN DESCRIPTION - FREQUENCY: FREQUENCY: INTERMITTENT

## 2021-01-04 ASSESSMENT — PAIN SCALES - GENERAL
PAINLEVEL_OUTOF10: 0
PAINLEVEL_OUTOF10: 5

## 2021-01-04 NOTE — PLAN OF CARE
Problem: Pain:  Goal: Pain level will decrease  Description: Pain level will decrease  Outcome: Ongoing   Patient denies any pain this morning  Problem: Altered Mood, Deterioration in Function:  Goal: Able to verbalize reality based thinking  Description: Able to verbalize reality based thinking  1/4/2021 1025 by Isabela Diez RN  Outcome: Ongoing     Problem: Altered Mood, Psychotic Behavior:  Goal: Able to verbalize decrease in frequency and intensity of hallucinations  Description: Able to verbalize decrease in frequency and intensity of hallucinations  1/4/2021 1025 by Isabela Diez RN  Outcome: Ongoing  Patient denies auditory or visual hallucinations, but appears preoccupied, self talk and gesturing noted. Patient is religiously preoccupied, frequently asking for the\"[facility] to come pray for me\". Loose associations noted during 1:1 talk time. Patient is medication complaint and attends some groups. 15 min safety checks con't .

## 2021-01-04 NOTE — BH NOTE
Patient refused to sign voluntary admission consent and all other admission forms. Patient became loud, bizarre comments noted, yelling out for his .

## 2021-01-04 NOTE — GROUP NOTE
Group Therapy Note    Date: 1/4/2021    Group Start Time: 1400  Group End Time: 1450  Group Topic: Cognitive Skills    [facility]    [place], [place]        Group Therapy Note    Attendees: 11/14         Patient's Goal:  To increase interpersonal interaction. Notes:  Pt attended and participated in group. Status After Intervention:  Improved    Participation Level:  Active Listener and Interactive    Participation Quality: Appropriate, Attentive and Sharing      Speech:  normal      Thought Process/Content: Logical      Affective Functioning: Congruent      Mood: euthymic      Level of consciousness:  Alert and Attentive      Response to Learning: Able to verbalize current knowledge/experience and Progressing to goal      Endings: None Reported    Modes of Intervention: Education, Socialization, Exploration, Problem-solving, Media and Reality-testing      Discipline Responsible: Psychoeducational Specialist      Signature:  Kalyani Polk

## 2021-01-04 NOTE — PLAN OF CARE
Problem: Altered Mood, Deterioration in Function:  Goal: Able to verbalize reality based thinking  Description: Able to verbalize reality based thinking  Note: Writer asked patient if he was having and pain and pt states \" I would be better if I talked to my dog today I wash him with lrema's oil soap, I am waiting for a return call from the [employer]\". PT then walks away from Zaki Bell. PT has very poor sleep spends most of the night talking and laughing with self in his room. At one point pt came out of room laughing exteremly loud ran across hallway and knocked on shower door 3 times and ran back to room. Writer went to ask pt if he needed anything and pt pretending to be asleep. Problem: Altered Mood, Psychotic Behavior:  Goal: Able to verbalize decrease in frequency and intensity of hallucinations  Description: Able to verbalize decrease in frequency and intensity of hallucinations  Note: Pt currently denies hallucinations but is engaged in self talk through out the shift. PT appears preoccupied with own thoughts.

## 2021-01-04 NOTE — PROGRESS NOTES
BEHAVIORAL HEALTH FOLLOW-UP NOTE     1/4/2021     Patient was seen and examined in person, Chart reviewed   Patient's case discussed with staff/team    Chief Complaint: Acute psychosis    Interim History: Pt was seen in his bedroom. Pt was interruptive, evasive, and guarded. Saumya Nicholson was loosely associating, and irritated with writer. He was very demanding, and difficult to redirect. Saumya Nicholson would initially refuse to answer any questions, but then after some minutes have passed, he would answer the question, then demanded that writer answer a question for him. His questions were bizarre in nature, and nonsensical like, \"why isn't pastoral care addressing my knee pain? \"  Saumya Nicholson is denying any suicidal and homicidal ideation. Saumya Nicholson denies any mood issues. Appetite:   [x] Normal/Unchanged  [] Increased  [] Decreased      Sleep:       [x] Normal/Unchanged  [] Fair       [] Poor              Energy:    [x] Normal/Unchanged  [] Increased  [] Decreased        Aggression:  [] yes  [x] no    Patient is [x] able  [] unable to CONTRACT FOR SAFETY ON THE UNIT    PAST MEDICAL/PSYCHIATRIC HISTORY:   Past Medical History:   Diagnosis Date    Arthritis     Bilateral posterior subcapsular polar cataract     [facility]ipolar [facility] [address]     Brief psychotic disorder ([facility] [address] [facility]     Chronic pain of right knee     Hypertension     Tinnitus of both ears        FAMILY/SOCIAL HISTORY:  No family history on file.   Social History     Socioeconomic History    Marital status: Single     Spouse name: Not on file    Number of children: Not on file    Years of education: Not on file    Highest education level: Not on file   Occupational History    Not on file   Social Needs    Financial resource strain: Not on file    Food insecurity     Worry: Not on file     Inability: Not on file    Transportation needs     Medical: Not on file     Non-medical: Not on file   Tobacco Use    Smoking status: Former Smoker     Types: Cigars     Quit date: 2020     Years since quittin.9    Smokeless tobacco: Never Used   Substance and Sexual Activity    Alcohol use: No    Drug use: No    Sexual activity: Never   Lifestyle    Physical activity     Days per week: Not on file     Minutes per session: Not on file    Stress: Not on file   Relationships    Social connections     Talks on phone: Not on file     Gets together: Not on file     Attends Pentecostal service: Not on file     Active member of club or organization: Not on file     Attends meetings of clubs or organizations: Not on file     Relationship status: Not on file    Intimate partner violence     Fear of current or ex partner: Not on file     Emotionally abused: Not on file     Physically abused: Not on file     Forced sexual activity: Not on file   Other Topics Concern    Not on file   Social History Narrative    Not on file           ROS:  [x] All negative/unchanged except if checked.  Explain positive(checked items) below:  [] Constitutional  [] Eyes  [] Ear/Nose/Mouth/Throat  [] Respiratory  [] CV  [] GI  []   [] Musculoskeletal  [] Skin/Breast  [] Neurological  [] Endocrine  [] Heme/Lymph  [] Allergic/Immunologic    Explanation:     MEDICATIONS:    Current Facility-Administered Medications:     traZODone (DESYREL) tablet 50 mg, 50 mg, Oral, Nightly, BETTY Mccord CNP    risperiDONE (RISPERDAL) tablet 2 mg, 2 mg, Oral, BID, BETTY Eli CNP, 2 mg at 21 4204    diclofenac sodium (VOLTAREN) 1 % gel 2 g, 2 g, Topical, 4x Daily PRN, Carloz Sarabia MD    lamoTRIgine (LAMICTAL) tablet 200 mg, 200 mg, Oral, Daily, Carloz Sarabia MD, 200 mg at 21 6114    acetaminophen (TYLENOL) tablet 650 mg, 650 mg, Oral, Q4H PRN, Maged Smith MD, 650 mg at 21 0423    aluminum & magnesium hydroxide-simethicone (MAALOX) 200-200-20 MG/5ML suspension 30 mL, 30 mL, Oral, Q6H PRN, Maged Smith MD    hydrOXYzine (ATARAX) tablet 50 mg, 50 mg, Oral, TID PRN, Shakira Brand Hadley Valencia MD, 50 mg at 01/01/21 2214    ibuprofen (ADVIL;MOTRIN) tablet 400 mg, 400 mg, Oral, Q6H PRN, Talon Long MD    nicotine polacrilex (NICORETTE) gum 2 mg, 2 mg, Oral, PRN, Talon Long MD    polyethylene glycol (GLYCOLAX) packet 17 g, 17 g, Oral, Daily PRN, Talon Long MD    lisinopril (PRINIVIL;ZESTRIL) tablet 20 mg, 20 mg, Oral, Daily, Poncho Nunez MD, 20 mg at 01/04/21 0029    haloperidol lactate (HALDOL) injection 5 mg, 5 mg, Intramuscular, Q4H PRN **OR** haloperidol (HALDOL) tablet 5 mg, 5 mg, Oral, Q4H PRN, Poncho Nunez MD, 5 mg at 12/31/20 1159    LORazepam (ATIVAN) tablet 1 mg, 1 mg, Oral, Q4H PRN, 1 mg at 12/31/20 1159 **OR** LORazepam (ATIVAN) injection 1 mg, 1 mg, Intramuscular, Q4H PRN, Poncho Nunez MD      Examination:  /64   Pulse 84   Temp 98.5 °F (36.9 °C) (Oral)   Resp 16   SpO2 96%   Gait - steady  Medication side effects(SE): none. Mental Status Examination:    Level of consciousness:  within normal limits   Appearance:  fair grooming and fair hygiene  Behavior/Motor:  no abnormalities noted  Attitude toward examiner:  evasive, guarded, argumentative and hostile  Speech:  hyperverbal, interrupting and high productivity   Mood: labile  Affect:  mood congruent  Thought processes:  overabundance of ideas, loose associations and incoherent   Thought content:  Homicidal ideation - none  Suicidal Ideation:  denies suicidal ideation  Delusions:  no evidence of delusions  Perceptual Disturbance:  denies any perceptual disturbance  Cognition:  oriented to person, place, and time   Concentration distractible  Insight poor   Judgement poor     ASSESSMENT:   Patient symptoms are:  [] Well controlled  [] Improving  [] Worsening  [x] No change      Diagnosis:   Active Problems:    Kalie ([address]  Resolved Problems:    * No resolved hospital problems. *      LABS:    No results for input(s): WBC, HGB, PLT in the last 72 hours.   Recent Labs     01/02/21  0721   NA 133*   K 4.6   CL 97*   CO2 26   BUN 14   CREATININE 0.76   GLUCOSE 125*     No results for input(s): BILITOT, ALKPHOS, AST, ALT in the last 72 hours. No results found for: [address], BARBSCNU, LABBENZ, CANNAB, COCAINESCRN, LABMETH, OPIATESCREENURINE, PHENCYCLIDINESCREENURINE, PPXUR, ETOH  Lab Results   Component Value Date    TSH 1.06 01/01/2021     No results found for: LITHIUM  No results found for: VALPROATE, CBMZ    RISK ASSESSMENT: Low to moderate risk of harm to self and others. Low to moderate risk of aggression. Treatment Plan:  Reviewed current Medications with the patient. Risks, benefits, side effects, drug-to-drug interactions and alternatives to treatment were discussed. The patient has consented to treatment. Encourage patient to attend group and other milieu activities. Discharge planning discussed with the patient and treatment team.    PSYCHOTHERAPY/COUNSELING:  [] Therapeutic interview  [x] Supportive  [] CBT  [] Ongoing  [] Other    [x] Patient continues to need, on a daily basis, active treatment furnished directly by or requiring the supervision of inpatient psychiatric personnel      Anticipated Length of stay:3-5 days. --BETTY Keyes - NP on 1/4/2021 at 11:30 AM    An electronic signature was used to authenticate this note. I independently saw and evaluated the patient. I reviewed the midlevel provider's documentation above. Any additional comments or changes to the midlevel provider's documentation are stated below otherwise agree with assessment. Patient remains grandiose in his behavior. He is derogatory towards staff though he was respectful towards me because I am an MD.  The patient has been taking his medications. He was willing to sign in as a voluntary patient    Patient s symptoms   are improving    PLAN  Continue medications as above  Attempt to develop insight  Psycho-education conducted.   Supportive Therapy conducted.   Probable discharge is next week  Follow-up daily while on inpatient unit    Electronically signed by Edgardo Spence MD on 1/4/21 at 2:28 PM EST

## 2021-01-04 NOTE — GROUP NOTE
Group Therapy Note    Date: 1/4/2021    Group Start Time: 0900  Group End Time: 0915  Group Topic: Community Meeting    [facility]    [place], [place]        Group Therapy Note    Attendees: 8/15         Patient's Goal:  To increase interpersonal interaction. Notes:  Pt attended and participated in group. Status After Intervention:  Improved    Participation Level:  Active Listener and Interactive    Participation Quality: Appropriate, Attentive and Sharing      Speech:  normal      Thought Process/Content: Logical      Affective Functioning: Congruent      Mood: euthymic      Level of consciousness:  Alert and Attentive      Response to Learning: Able to verbalize current knowledge/experience, Able to retain information and Progressing to goal      Endings: None Reported    Modes of Intervention: Education, Support, Socialization, Clarifying and Reality-testing      Discipline Responsible: Psychoeducational Specialist      Signature:  Shamika Machado

## 2021-01-04 NOTE — BH NOTE
DISCHARGE INFORMATION:  - Writer speaks with [address] regarding client's probate from [place]. Client's case has been transferred from [facility] to [facility] and court will be this Thursday per Lisa Sorensen. - Writer sends her all the paperwork from chart via [address] [place] [address] Street file.

## 2021-01-04 NOTE — GROUP NOTE
Group Therapy Note    Date: 1/4/2021    Group Start Time: 1600  Group End Time: 0685  Group Topic: Group Documentation    OSMEL Singh        Group Therapy Note    Attendees: 12/14         Patient's Goal: Attend and participate in wellness group  Notes:  Positive therapeutic ball  Status After Intervention:  Improved    Participation Level:  Active Listener and Interactive    Participation Quality: Appropriate, Attentive, Sharing and Supportive      Speech:  normal      Thought Process/Content: Logical      Affective Functioning: Congruent      Mood: anxious      Level of consciousness:  Alert, Oriented x4 and Attentive      Response to Learning: Able to verbalize current knowledge/experience, Able to verbalize/acknowledge new learning, Able to retain information and Capable of insight      Endings: None Reported    Modes of Intervention: Education, Support, Socialization, Exploration and Problem-solving      Discipline Responsible: [address], [employer]      Signature:  Gurpreet Singh

## 2021-01-04 NOTE — GROUP NOTE
Group Therapy Note    Date: 1/4/2021    Group Start Time: 1100  Group End Time: 2503  Group Topic: Psychoeducation    [facility]    [place], [address]        Group Therapy Note    Attendees: 6/15         Patient's Goal:  To increase interpersonal interaction. Notes:  Pt attended and participated in group. Status After Intervention:  Improved    Participation Level:  Active Listener and Interactive    Participation Quality: Appropriate, Attentive and Sharing      Speech:  normal      Thought Process/Content: Logical      Affective Functioning: Congruent      Mood: euthymic      Level of consciousness:  Alert and Attentive      Response to Learning: Progressing to goal      Endings: None Reported    Modes of Intervention: Socialization, Problem-solving, Activity, Media and Reality-testing      Discipline Responsible: Psychoeducational Specialist      Signature:  Robb Guajardo

## 2021-01-05 PROCEDURE — 1240000000 HC EMOTIONAL WELLNESS R&B

## 2021-01-05 PROCEDURE — 6370000000 HC RX 637 (ALT 250 FOR IP): Performed by: NURSE PRACTITIONER

## 2021-01-05 PROCEDURE — 99232 SBSQ HOSP IP/OBS MODERATE 35: CPT | Performed by: PSYCHIATRY & NEUROLOGY

## 2021-01-05 PROCEDURE — 6370000000 HC RX 637 (ALT 250 FOR IP): Performed by: PSYCHIATRY & NEUROLOGY

## 2021-01-05 RX ORDER — ARIPIPRAZOLE 10 MG/1
10 TABLET ORAL DAILY
Status: DISCONTINUED | OUTPATIENT
Start: 2021-01-05 | End: 2021-01-06

## 2021-01-05 RX ORDER — RISPERIDONE 1 MG/1
1 TABLET, FILM COATED ORAL 2 TIMES DAILY
Status: DISCONTINUED | OUTPATIENT
Start: 2021-01-05 | End: 2021-01-07

## 2021-01-05 RX ADMIN — LAMOTRIGINE 200 MG: 100 TABLET ORAL at 07:37

## 2021-01-05 RX ADMIN — TRAZODONE HYDROCHLORIDE 50 MG: 50 TABLET ORAL at 21:25

## 2021-01-05 RX ADMIN — ACETAMINOPHEN 650 MG: 325 TABLET, FILM COATED ORAL at 19:19

## 2021-01-05 RX ADMIN — RISPERIDONE 1 MG: 1 TABLET ORAL at 21:25

## 2021-01-05 RX ADMIN — LISINOPRIL 20 MG: 20 TABLET ORAL at 07:37

## 2021-01-05 RX ADMIN — ACETAMINOPHEN 650 MG: 325 TABLET, FILM COATED ORAL at 03:54

## 2021-01-05 RX ADMIN — RISPERIDONE 2 MG: 2 TABLET ORAL at 07:37

## 2021-01-05 RX ADMIN — DICLOFENAC 2 G: 10 GEL TOPICAL at 16:45

## 2021-01-05 RX ADMIN — ACETAMINOPHEN 650 MG: 325 TABLET, FILM COATED ORAL at 13:19

## 2021-01-05 RX ADMIN — ARIPIPRAZOLE 10 MG: 10 TABLET ORAL at 16:45

## 2021-01-05 RX ADMIN — DICLOFENAC 2 G: 10 GEL TOPICAL at 21:26

## 2021-01-05 ASSESSMENT — PAIN DESCRIPTION - FREQUENCY: FREQUENCY: INTERMITTENT

## 2021-01-05 ASSESSMENT — PAIN - FUNCTIONAL ASSESSMENT: PAIN_FUNCTIONAL_ASSESSMENT: ACTIVITIES ARE NOT PREVENTED

## 2021-01-05 ASSESSMENT — PAIN SCALES - GENERAL: PAINLEVEL_OUTOF10: 3

## 2021-01-05 NOTE — GROUP NOTE
Group Therapy Note    Date: 1/5/2021    Group Start Time: 1330  Group End Time: 0546  Group Topic: cognitive skills     OSMEL Larsen, CTRS        Group Therapy Note    Attendees: 6         Patient's Goal:  To improve decision making skills     Notes:   Pt was hyperverbal and agitated at times    Status After Intervention: unchanged     Participation Level:  Active Listener and Interactive    Participation Quality: Appropriate, Attentive and Sharing      Speech:  hyperverbal       Thought Process/Content: disorganized, hyperverbal       Affective Functioning: flat      Mood: labile    Level of consciousness:  Alert       Response to Learning:Progressing to goal      Endings: None Reported    Modes of Intervention: Education, Support and Problem-solving      Discipline Responsible: Psychoeducational Specialist      Signature:  Brain Goldmann

## 2021-01-05 NOTE — GROUP NOTE
Group Therapy Note    Date: January 5th, 2021     Group Start Time: 1000                     Group End Time: 1096     Group Topic: Psychotherapy     [facility]    [address] [place], [address], [place]     Group Therapy Note     Attendees: 8/15    Patient's Goal: develop stress management / identify triggers and safety planning / discharge planning/ community resources / family support system / journaling      Notes:  participated in group      Status After Intervention: unchanged     Participation Level: monopolizing, intrusive     Participation Quality: inapropriate     Speech:  Pressured     Thought Process/Content: blocked     Affective Functioning: bizarre, unstable     Mood: paranoid, anxious, hyperactive     Level of consciousness:  Alert     Response to Learning: intrusive kept interrupting in group about being denied rights, asking for medications unknown     Endings: None Reported     Modes of Intervention: Support, Exploration, Clarifying and Problem-solving     Discipline Responsible: Licensed Professional Clinical Counselor     Signature:  [address], [facility]

## 2021-01-05 NOTE — GROUP NOTE
Group Therapy Note    Date: 1/5/2021    Group Start Time: 0900  Group End Time: 0920  Group Topic: Community Meeting    [facility]    Patient refused to attend community meeting and goal setting group at 0900 after encouragement from staff. 1:1 talk time offered by staff as alternative to group session.

## 2021-01-05 NOTE — FLOWSHEET NOTE
*Patient participated in the Spirituality Group       01/05/21 1528   Encounter Summary   Services provided to: Patient   Referral/Consult From: Rounding   Continue Visiting   (1/5/21)   Complexity of Encounter Moderate   Length of Encounter 30 minutes   Spiritual/Rastafari   Type Spiritual support   Assessment Approachable   Intervention Active listening   Outcome Receptive;Engaged in conversation

## 2021-01-05 NOTE — PLAN OF CARE
Problem: Pain:  Goal: Pain level will decrease  Description: Pain level will decrease  Outcome: Ongoing   Pt is satisfied with pain management    Problem: Altered Mood, Deterioration in Function:  Goal: Able to verbalize reality based thinking  Description: Able to verbalize reality based thinking  Outcome: Ongoing   Pt repeatedly threatens to gris staff, is selective with medication at first and then does take all of it. \"I will get out of here and I will see you in court\" Boundaries are poor and he requires firm limits at times but is accepting with encouragement.   Problem: Altered Mood, Psychotic Behavior:  Goal: Able to verbalize decrease in frequency and intensity of hallucinations  Description: Able to verbalize decrease in frequency and intensity of hallucinations  Outcome: Ongoing   Pt engages in self talk

## 2021-01-05 NOTE — BH NOTE
- Client is not willing to sign in for treatment on this day. - Writer confirms with Anahi Wall this is a probate transfer from [facility] and we do not need to fill probate paperwork [facility] We just need to have court with the doctor on Thursday, Jan. 7th at 1:00pm to accept the case in our [facility].

## 2021-01-05 NOTE — PROGRESS NOTES
BEHAVIORAL HEALTH FOLLOW-UP NOTE     1/5/2021     Patient was seen and examined in person, Chart reviewed   Patient's case discussed with staff/team    Chief Complaint: Acute psychosis    Interim History: Pt was seen in dayroom. He is grandiose. Laith Christensen is making loose associations. He starts by speaking about medications he is not getting that he was taking at home, to family issues he is having, to how his insurance is not covering some medications. He was perseverating on getting his \"abilify and voltaran,\" but will disclose who was prescribing this for him in the outpatient setting. Laith Christensen is not cooperative with interview, evasive and guarded about answering any questions. He is less hostile, and aggressive today. He is continuing to state that staff and writer are \"persecuting,\" him. Appetite:   [x] Normal/Unchanged  [] Increased  [] Decreased      Sleep:       [x] Normal/Unchanged  [] Fair       [] Poor              Energy:    [x] Normal/Unchanged  [] Increased  [] Decreased        Aggression:  [] yes  [x] no    Patient is [x] able  [] unable to CONTRACT FOR SAFETY ON THE UNIT    PAST MEDICAL/PSYCHIATRIC HISTORY:   Past Medical History:   Diagnosis Date    Arthritis     Bilateral posterior subcapsular polar cataract     Bipolar 1 disorder ([address]     Brief psychotic disorder ([address]     Chronic pain of right knee     Hypertension     Tinnitus of both ears        FAMILY/SOCIAL HISTORY:  No family history on file.   Social History     Socioeconomic History    Marital status: Single     Spouse name: Not on file    Number of children: Not on file    Years of education: Not on file    Highest education level: Not on file   Occupational History    Not on file   Social Needs    Financial resource strain: Not on file    Food insecurity     Worry: Not on file     Inability: Not on file    Transportation needs     Medical: Not on file     Non-medical: Not on file   Tobacco Use    Smoking status: Former Smoker     Types: Cigars     Quit date: 2020     Years since quittin.9    Smokeless tobacco: Never Used   Substance and Sexual Activity    Alcohol use: No    Drug use: No    Sexual activity: Never   Lifestyle    Physical activity     Days per week: Not on file     Minutes per session: Not on file    Stress: Not on file   Relationships    Social connections     Talks on phone: Not on file     Gets together: Not on file     Attends Taoism service: Not on file     Active member of club or organization: Not on file     Attends meetings of clubs or organizations: Not on file     Relationship status: Not on file    Intimate partner violence     Fear of current or ex partner: Not on file     Emotionally abused: Not on file     Physically abused: Not on file     Forced sexual activity: Not on file   Other Topics Concern    Not on file   Social History Narrative    Not on file           ROS:  [x] All negative/unchanged except if checked.  Explain positive(checked items) below:  [] Constitutional  [] Eyes  [] Ear/Nose/Mouth/Throat  [] Respiratory  [] CV  [] GI  []   [] Musculoskeletal  [] Skin/Breast  [] Neurological  [] Endocrine  [] Heme/Lymph  [] Allergic/Immunologic    Explanation:     MEDICATIONS:    Current Facility-Administered Medications:     traZODone (DESYREL) tablet 50 mg, 50 mg, Oral, Nightly, Librado Collazo APRN - CNP, 50 mg at 21 2159    risperiDONE (RISPERDAL) tablet 2 mg, 2 mg, Oral, BID, Jordana Schaffer APRN - CNP, 2 mg at 21 6431    diclofenac sodium (VOLTAREN) 1 % gel 2 g, 2 g, Topical, 4x Daily PRN, Axel Mccollum MD    lamoTRIgine (LAMICTAL) tablet 200 mg, 200 mg, Oral, Daily, Axel Mccollum MD, 200 mg at 21 0737    acetaminophen (TYLENOL) tablet 650 mg, 650 mg, Oral, Q4H PRN, Edgard Jon MD, 650 mg at 21 0354    aluminum & magnesium hydroxide-simethicone (MAALOX) 200-200-20 MG/5ML suspension 30 mL, 30 mL, Oral, Q6H PRN, MD Vladimir Pardo hydrOXYzine (ATARAX) tablet 50 mg, 50 mg, Oral, TID PRN, Pastora Chirinos MD, 50 mg at 01/01/21 2214    ibuprofen (ADVIL;MOTRIN) tablet 400 mg, 400 mg, Oral, Q6H PRN, Pastora Chirinos MD    nicotine polacrilex (NICORETTE) gum 2 mg, 2 mg, Oral, PRN, Pastora Chirinos MD    polyethylene glycol (GLYCOLAX) packet 17 g, 17 g, Oral, Daily PRN, Pastora Chirinos MD    lisinopril (PRINIVIL;ZESTRIL) tablet 20 mg, 20 mg, Oral, Daily, Nita Michel MD, 20 mg at 01/05/21 0737    haloperidol lactate (HALDOL) injection 5 mg, 5 mg, Intramuscular, Q4H PRN **OR** haloperidol (HALDOL) tablet 5 mg, 5 mg, Oral, Q4H PRN, Nita Michel MD, 5 mg at 12/31/20 1159    LORazepam (ATIVAN) tablet 1 mg, 1 mg, Oral, Q4H PRN, 1 mg at 12/31/20 1159 **OR** LORazepam (ATIVAN) injection 1 mg, 1 mg, Intramuscular, Q4H PRN, Nita Michel MD      Examination:  BP (!) 146/89   Pulse 79   Temp 97.9 °F (36.6 °C) (Oral)   Resp 16   SpO2 96%   Gait - steady  Medication side effects(SE): none. Mental Status Examination:    Level of consciousness:  within normal limits   Appearance:  fair grooming and fair hygiene  Behavior/Motor:  agitated  Attitude toward examiner:  poor eye contact, evasive, guarded, argumentative and hostile  Speech:  hyperverbal and interrupting   Mood: irritable  Affect:  angry  Thought processes:  rapid, loose associations and perservative   Thought content:  Homicidal ideation - none  Suicidal Ideation:  denies suicidal ideation  Delusions:  persecutory and grandiose  Perceptual Disturbance:  denies any perceptual disturbance  Cognition:  oriented to person, place, and time   Concentration distractible  Insight poor   Judgement poor     ASSESSMENT:   Patient symptoms are:  [] Well controlled  [x] Improving  [] Worsening  [] No change      Diagnosis:   Active Problems:    Kalie ([facility]  Resolved Problems:    * No resolved hospital problems. *      LABS:    No results for input(s): WBC, HGB, PLT in the last 72 hours.   No results for input(s): NA, K, CL, CO2, BUN, CREATININE, GLUCOSE in the last 72 hours. No results for input(s): BILITOT, ALKPHOS, AST, ALT in the last 72 hours. No results found for: Aaron Mario, LABBENZ, CANNAB, COCAINESCRN, LABMETH, OPIATESCREENURINE, PHENCYCLIDINESCREENURINE, PPXUR, ETOH  Lab Results   Component Value Date    TSH 1.06 01/01/2021     No results found for: LITHIUM  No results found for: VALPROATE, CBMZ    RISK ASSESSMENT: Low risk of harm to self, Low risk of harm to others. Low to moderate risk of aggression. Treatment Plan:  Reviewed current Medications with the patient. Risks, benefits, side effects, drug-to-drug interactions and alternatives to treatment were discussed. The patient has consented to treatment. Encourage patient to attend group and other milieu activities. Discharge planning discussed with the patient and treatment team.    PSYCHOTHERAPY/COUNSELING:  [] Therapeutic interview  [x] Supportive  [] CBT  [] Ongoing  [] Other    [x] Patient continues to need, on a daily basis, active treatment furnished directly by or requiring the supervision of inpatient psychiatric personnel      Anticipated Length of stay:3-5 days. --Thresa Holter, APRN - NP on 1/5/2021 at 10:00 AM    An electronic signature was used to authenticate this note. I independently saw and evaluated the patient. I reviewed the midlevel provider's documentation above. Any additional comments or changes to the midlevel provider's documentation are stated below otherwise agree with assessment. The patient insists that risperidone has run its course and he has not benefited from it in the past.  He believes that Abilify right medication from it and he wants to be on it. The patient signed in to be a voluntary patient. Patient s symptoms   show slight improvement    PLAN  Start Abilify 10 mg daily.   Reduce risperidone to 1 mg twice daily  Attempt to develop insight  Psycho-education conducted. Supportive Therapy conducted.   Probable discharge is next week  Follow-up daily while on inpatient unit    Electronically signed by Eldon Barroso MD on 1/5/21 at 2:25 PM EST

## 2021-01-05 NOTE — GROUP NOTE
Group Therapy Note    Date: 1/5/2021    Group Start Time: 1100  Group End Time: 1140  Group Topic: Psychoeducation    DEMETRIUS DennyS    Group Therapy Note    Attendees: 9    Patient's Goal:  Pt will verbalize benefits of music for coping and relaxation.     Notes:  Pt attended group and participated with encouragement from staff    Status After Intervention:  Improved    Participation Level: Interactive    Participation Quality: Appropriate and Sharing      Speech:  pressured      Thought Process/Content: Logical      Affective Functioning: Constricted/Restricted      Mood: euthymic      Level of consciousness:  Alert and Preoccupied      Response to Learning: Able to verbalize current knowledge/experience and Able to verbalize/acknowledge new learning      Endings: None Reported    Modes of Intervention: Education, Support, Socialization, Exploration and Media      Discipline Responsible: Psychoeducational Specialist      Signature:  Mounika Lundberg [place]

## 2021-01-05 NOTE — PLAN OF CARE
Problem: Altered Mood, Deterioration in Function:  Goal: Able to verbalize reality based thinking  Description: Able to verbalize reality based thinking  1/5/2021 1058 by Nicholas Saenz RN  Outcome: Ongoing     Problem: Altered Mood, Psychotic Behavior:  Goal: Able to verbalize decrease in frequency and intensity of hallucinations  Description: Able to verbalize decrease in frequency and intensity of hallucinations  1/5/2021 1058 by Nicholas Saenz RN  Outcome: Ongoing  Pt has flight of ideas and is hyper-verbal in the milieu. Pt denies auditory hallucinations however displays self talk. Pt is labile at times but is accepting of redirection. Pt. Remains on q15 min checks and frequent spontaneous checks throughout shift. Pt. Safety maintained.

## 2021-01-06 PROCEDURE — 6370000000 HC RX 637 (ALT 250 FOR IP): Performed by: PSYCHIATRY & NEUROLOGY

## 2021-01-06 PROCEDURE — 1240000000 HC EMOTIONAL WELLNESS R&B

## 2021-01-06 PROCEDURE — 99232 SBSQ HOSP IP/OBS MODERATE 35: CPT | Performed by: PSYCHIATRY & NEUROLOGY

## 2021-01-06 RX ORDER — IBUPROFEN 600 MG/1
600 TABLET ORAL EVERY 6 HOURS PRN
Status: DISCONTINUED | OUTPATIENT
Start: 2021-01-06 | End: 2021-01-18 | Stop reason: HOSPADM

## 2021-01-06 RX ADMIN — LISINOPRIL 20 MG: 20 TABLET ORAL at 08:11

## 2021-01-06 RX ADMIN — ACETAMINOPHEN 650 MG: 325 TABLET, FILM COATED ORAL at 12:56

## 2021-01-06 RX ADMIN — LAMOTRIGINE 200 MG: 100 TABLET ORAL at 08:11

## 2021-01-06 RX ADMIN — IBUPROFEN 400 MG: 400 TABLET, FILM COATED ORAL at 09:15

## 2021-01-06 RX ADMIN — ACETAMINOPHEN 650 MG: 325 TABLET, FILM COATED ORAL at 04:51

## 2021-01-06 RX ADMIN — DICLOFENAC 2 G: 10 GEL TOPICAL at 10:38

## 2021-01-06 RX ADMIN — ARIPIPRAZOLE 10 MG: 10 TABLET ORAL at 08:11

## 2021-01-06 ASSESSMENT — PAIN SCALES - GENERAL
PAINLEVEL_OUTOF10: 3
PAINLEVEL_OUTOF10: 4
PAINLEVEL_OUTOF10: 1
PAINLEVEL_OUTOF10: 3

## 2021-01-06 NOTE — GROUP NOTE
Group Therapy Note    Date: January 6th, 2021     Group Start Time: 1000                     Group End Time: 1030     Group Topic: Psychotherapy     [facility]MEL HARRIS, [facility]     Group Therapy Note     Attendees: 4/15    Patient's Goal: develop stress management / identify triggers and safety planning / discharge planning/ community resources / family support system / journaling      Notes:  participated in group      Status After Intervention:  Unchanged     Participation Level:  Active Listener and Interactive     Participation Quality: monopolizing, intrusive, impulsive     Speech:  pressured     Thought Process/Content: preoccupied, bizarre     Affective Functioning: unstable     Mood: anxious     Level of consciousness:  Alert, Oriented x4     Response to Learning: Progressing to goal     Endings: None Reported     Modes of Intervention: Support, Exploration, Clarifying and Problem-solving     Discipline Responsible: Licensed Professional Clinical Counselor     Signature:  Stephanie Priest [facility], [address], [facility]

## 2021-01-06 NOTE — GROUP NOTE
Group Therapy Note    Date: 1/6/2021    Group Start Time: 1430  Group End Time: 5859  Group Topic: Recreational    [facility] Pe[facility] [place], [place]    Attendees: 6         Patient's Goal:  To demonstrate increased interpersonal skills. Notes:  Patient attended group and participated in task at hand but needed continuous redirection due to tangential speech and interrupting group by talking over others + rapid change of topics. Patient responded minimally to redirection given.      Status After Intervention:  Improved    Participation Level: Interactive    Participation Quality: Sharing and Intrusive      Speech:  normal      Thought Process/Content: Flight of ideas      Affective Functioning: Congruent      Mood: euthymic      Level of consciousness:  Alert and Attentive      Response to Learning: Able to verbalize current knowledge/experience, Able to verbalize/acknowledge new learning and Progressing to goal      Endings: None Reported      Modes of Intervention: Socialization, Exploration, Clarifying, Problem-solving, Activity, Limit-setting and Reality-testing      Discipline Responsible: Psychoeducational Specialist      Signature:  Jesse Moran

## 2021-01-06 NOTE — PLAN OF CARE
Problem: Pain:  Goal: Pain level will decrease  Description: Pain level will decrease  1/6/2021 0015 by Jaida Juarez RN  Outcome: Ongoing   Pt is satisfied with pain management    Problem: Altered Mood, Deterioration in Function:  Goal: Able to verbalize reality based thinking  Description: Able to verbalize reality based thinking  1/6/2021 0015 by Jaida Juarez RN  Outcome: Ongoing   Pt appears less anxious and is more controlled this shift. He apologizes to staff for being so angry before.  \"I will cooperate now that I am getting good care\" he eats well at snack and accepts all medication

## 2021-01-06 NOTE — PLAN OF CARE
Q 15 minutes checks completed safety checks completed. Pt is med complaint at this time. Pt is controlled at this time. Pt has good hygiene. Pt is pleasant with others and staff. No other concerns at this time.

## 2021-01-06 NOTE — GROUP NOTE
Group Therapy Note    Date: 1/6/2021    Group Start Time: 1100  Group End Time: 1427  Group Topic: Cognitive Skills    OSMEL Ling, [address]    Attendees: 6         Patient's Goal:  To demonstrate increased interpersonal skills. Notes:  Patient attended group and actively participated in task at hand. Patient needed redirection at times due to flight of ideas and trouble focusing. Status After Intervention:  Improved    Participation Level:  Active Listener and Interactive    Participation Quality: Appropriate, Attentive and Sharing      Speech:  normal      Thought Process/Content: Flight of ideas      Affective Functioning: Congruent      Mood: euthymic      Level of consciousness:  Alert, Oriented x4 and Attentive      Response to Learning: Able to verbalize current knowledge/experience, Able to verbalize/acknowledge new learning, Able to retain information, Capable of insight and Progressing to goal      Endings: None Reported       Modes of Intervention: Socialization, Exploration, Clarifying, Problem-solving, Activity, Limit-setting and Reality-testing      Discipline Responsible: Psychoeducational Specialist      Signature:  Rajesh Romo

## 2021-01-06 NOTE — GROUP NOTE
Group Therapy Note    Date: 1/6/2021    Group Start Time: 0900  Group End Time: 0915  Group Topic: Community Meeting    [facility]    [place], [place]        Group Therapy Note    Attendees: 7/15         Patient's Goal:  To increase interpersonal interaction. Notes:  Pt attended and participated in group. Status After Intervention:  Improved    Participation Level:  Active Listener and Interactive    Participation Quality: Appropriate, Attentive and Sharing      Speech:  normal      Thought Process/Content: Logical      Affective Functioning: Congruent      Mood: euthymic      Level of consciousness:  Alert and Attentive      Response to Learning: Progressing to goal      Endings: None Reported    Modes of Intervention: Education, Support, Socialization, Clarifying and Reality-testing      Discipline Responsible: Psychoeducational Specialist      Signature:  Robb Guajardo

## 2021-01-06 NOTE — PROGRESS NOTES
BEHAVIORAL HEALTH FOLLOW-UP NOTE     1/6/2021     Patient was seen and examined in person, Chart reviewed   Patient's case discussed with staff/team    Chief Complaint: Acute psychosis    Interim History: Staff reports that patient is refusing Risperdal, attending groups. Interviewed patient in day room, attempted to move patient to his room for interview for more privacy but patient insisted on being seen in day room. Patient verbally consented for this writer to call his partner Dewayne Toscano, patient provided the following phone numbers: [phone] and [phone]. Spoke with patient's partner via phone. Partner reported that patient had been threatening to personnel at a bank and on the phone to \"[place] has jobs\", the police were called and patient was brought in to psychiatric unit. His partner reports that patient had been increasingly hyperverbal with racing thoughts and increasing in threatening behaviors over past few months. His partner feels that he would like the patient to be more stabilized before patient returns home, as he has had difficulty controlling the patient's behavior in the past.  His partner confirmed that patient has never been on Abilify, that he was on Risperdal at home. Reviewed records no record of patient ever being on Abilify. Discussed this with patient today. Patient continues to be hyperverbal, threatening to call his , and is insistent that he needs to be on Abilify because [facility] told me that is what I should be on\". Patient is reporting he is not getting his topical Voltaren, reminded patient that he had it earlier in the day and had shown it to this writer. Patient pulled out a strawberry jam packet, showing it to this writer saying this is not Voltaren. Patient was tangential with loose associations, unable to redirect conversation.   Patient will not raise voice at this point yelling very loudly and slapping his hands demanding that this writer order him Voltaren ointment, attempted to convey to patient this was already ordered as needed. Nursing staff came to de-escalate patient. Terminated interview at this point, attending physician completed interview. Appetite:   [x] Normal/Unchanged  [] Increased  [] Decreased      Sleep:       [x] Normal/Unchanged  [] Fair       [] Poor              Energy:    [x] Normal/Unchanged  [] Increased  [] Decreased        Aggression:  [x] yes  [] no    Patient is [x] able  [] unable to CONTRACT FOR SAFETY ON THE UNIT    PAST MEDICAL/PSYCHIATRIC HISTORY:   Past Medical History:   Diagnosis Date    Arthritis     Bilateral posterior subcapsular polar cataract     [facility] ([facility]     Brief psychotic disorder ([facility]     Chronic pain of right knee     Hypertension     Tinnitus of both ears        FAMILY/SOCIAL HISTORY:  No family history on file.   Social History     Socioeconomic History    Marital status: Single     Spouse name: Not on file    Number of children: Not on file    Years of education: Not on file    Highest education level: Not on file   Occupational History    Not on file   Social Needs    Financial resource strain: Not on file    Food insecurity     Worry: Not on file     Inability: Not on file    Transportation needs     Medical: Not on file     Non-medical: Not on file   Tobacco Use    Smoking status: Former Smoker     Types: Cigars     Quit date: 2020     Years since quittin.9    Smokeless tobacco: Never Used   Substance and Sexual Activity    Alcohol use: No    Drug use: No    Sexual activity: Never   Lifestyle    Physical activity     Days per week: Not on file     Minutes per session: Not on file    Stress: Not on file   Relationships    Social connections     Talks on phone: Not on file     Gets together: Not on file     Attends Taoism service: Not on file     Active member of club or organization: Not on file     Attends meetings of clubs or organizations: Not on file     Relationship status: Not on file    Intimate partner violence     Fear of current or ex partner: Not on file     Emotionally abused: Not on file     Physically abused: Not on file     Forced sexual activity: Not on file   Other Topics Concern    Not on file   Social History Narrative    Not on file           ROS:  [x] All negative/unchanged except if checked.  Explain positive(checked items) below:  [] Constitutional  [] Eyes  [] Ear/Nose/Mouth/Throat  [] Respiratory  [] CV  [] GI  []   [x] Musculoskeletal  [] Skin/Breast  [] Neurological  [] Endocrine  [] Heme/Lymph  [] Allergic/Immunologic    Explanation: Joint ache    MEDICATIONS:    Current Facility-Administered Medications:     diclofenac sodium (VOLTAREN) 1 % gel 2 g, 2 g, Topical, 4x Daily, BETTY Hunter CNP    risperiDONE (RISPERDAL) tablet 1 mg, 1 mg, Oral, BID, Charisse Young MD, 1 mg at 21    traZODone (DESYREL) tablet 50 mg, 50 mg, Oral, Nightly, BETTY Barron CNP, 50 mg at 21 212    lamoTRIgine (LAMICTAL) tablet 200 mg, 200 mg, Oral, Daily, Charisse Young MD, 200 mg at 21 5938    acetaminophen (TYLENOL) tablet 650 mg, 650 mg, Oral, Q4H PRN, Oliver Olson MD, 650 mg at 21 1256    aluminum & magnesium hydroxide-simethicone (MAALOX) 200-200-20 MG/5ML suspension 30 mL, 30 mL, Oral, Q6H PRN, Oliver Olson MD    hydrOXYzine (ATARAX) tablet 50 mg, 50 mg, Oral, TID PRN, Oliver Olson MD, 50 mg at 21 2214    ibuprofen (ADVIL;MOTRIN) tablet 400 mg, 400 mg, Oral, Q6H PRN, Oliver Olson MD, 400 mg at 21 0915    nicotine polacrilex (NICORETTE) gum 2 mg, 2 mg, Oral, PRN, Oliver Olson MD    polyethylene glycol (GLYCOLAX) packet 17 g, 17 g, Oral, Daily PRN, Oliver Olson MD    lisinopril (PRINIVIL;ZESTRIL) tablet 20 mg, 20 mg, Oral, Daily, Charisse Young MD, 20 mg at 21 0811    haloperidol lactate (HALDOL) injection 5 mg, 5 mg, Intramuscular, Q4H PRN back then was brief psychotic disorder. Concern for neurocognitive decline if the psychosis has been going on since April of last year or longer than that.        Electronically signed by Li Woodward MD on 1/6/21 at 5:25 PM EST

## 2021-01-06 NOTE — GROUP NOTE
Group Therapy Note    Date: 1/6/2021    Group Start Time: 1330  Group End Time: 8074  Group Topic: Cognitive Skills    [facility]    [place], [place]        Group Therapy Note    Attendees: 3/13    Pt did not attend RT skills group d/t resting in room despite staff invitation to attend. 1:1 talk time offered as alternative to group session, pt declined.

## 2021-01-07 PROCEDURE — 6370000000 HC RX 637 (ALT 250 FOR IP): Performed by: PSYCHIATRY & NEUROLOGY

## 2021-01-07 PROCEDURE — 99232 SBSQ HOSP IP/OBS MODERATE 35: CPT | Performed by: PSYCHIATRY & NEUROLOGY

## 2021-01-07 PROCEDURE — 1240000000 HC EMOTIONAL WELLNESS R&B

## 2021-01-07 PROCEDURE — 6370000000 HC RX 637 (ALT 250 FOR IP): Performed by: NURSE PRACTITIONER

## 2021-01-07 RX ORDER — ARIPIPRAZOLE 15 MG/1
15 TABLET ORAL DAILY
Status: DISCONTINUED | OUTPATIENT
Start: 2021-01-07 | End: 2021-01-08

## 2021-01-07 RX ADMIN — DICLOFENAC SODIUM 2 G: 10 GEL TOPICAL at 12:20

## 2021-01-07 RX ADMIN — LISINOPRIL 20 MG: 20 TABLET ORAL at 08:32

## 2021-01-07 RX ADMIN — ACETAMINOPHEN 650 MG: 325 TABLET, FILM COATED ORAL at 12:19

## 2021-01-07 RX ADMIN — ACETAMINOPHEN 650 MG: 325 TABLET, FILM COATED ORAL at 21:12

## 2021-01-07 RX ADMIN — DICLOFENAC SODIUM 2 G: 10 GEL TOPICAL at 08:34

## 2021-01-07 RX ADMIN — IBUPROFEN 600 MG: 600 TABLET, FILM COATED ORAL at 02:10

## 2021-01-07 RX ADMIN — IBUPROFEN 600 MG: 600 TABLET, FILM COATED ORAL at 15:33

## 2021-01-07 RX ADMIN — ARIPIPRAZOLE 15 MG: 15 TABLET ORAL at 15:31

## 2021-01-07 RX ADMIN — LAMOTRIGINE 200 MG: 100 TABLET ORAL at 08:31

## 2021-01-07 RX ADMIN — DICLOFENAC SODIUM 2 G: 10 GEL TOPICAL at 18:31

## 2021-01-07 ASSESSMENT — PAIN SCALES - GENERAL
PAINLEVEL_OUTOF10: 0
PAINLEVEL_OUTOF10: 4

## 2021-01-07 NOTE — GROUP NOTE
Group Therapy Note    Date: 1/7/2021    Group Start Time: 1100  Group End Time: 8632  Group Topic: Recreational    [facility] GALICIA    [place]        Group Therapy Note    Attendees: 7/15         Patient's Goal:  To increase interpsonal interactions, sense of community, self-expression, and non-confrontational communication skills. Patients did this by engaging in group conversations about statements, and whether they agreed or disagreed with the, (ex \"a hotdog is a sandwhich\", \"it is okay to lie\", \" and more)    Notes:  Patient attended and participated in second half of group, following doctors meeting. Had positive interactions with peers and staff, and engaging in a variety of conversations. Status After Intervention:  Improved    Participation Level:  Active Listener and Interactive    Participation Quality: Appropriate, Attentive and Sharing      Speech:  normal      Thought Process/Content: Logical  Linear      Affective Functioning: Constricted/Restricted; minimally changing      Mood: euthymic      Level of consciousness:  Alert and Attentive      Response to Learning: Able to verbalize current knowledge/experience and Progressing to goal      Endings: None Reported    Modes of Intervention: Support, Socialization, Exploration, Problem-solving, Confrontation and Reality-testing      Discipline Responsible: Psychoeducational Specialist      Signature:  [place]

## 2021-01-07 NOTE — GROUP NOTE
Group Therapy Note    Date: 1/7/2021    Group Start Time: 1600  Group End Time: 36  Group Topic: Psychotherapy    [facility] Packer        Group Therapy Note    Attendees: 7/14         Patient's Goal:  To attend and participate in group therapy. Notes:  Wellness/Self-Care    Status After Intervention:  Unchanged    Participation Level:  Active Listener and Interactive    Participation Quality: Appropriate, Attentive and Sharing      Speech:  normal      Thought Process/Content: Logical  Linear      Affective Functioning: Congruent      Mood: euthymic      Level of consciousness:  Oriented x4      Response to Learning: Able to verbalize current knowledge/experience and Able to verbalize/acknowledge new learning      Endings: None Reported    Modes of Intervention: Education, Support and Socialization      Discipline Responsible: Behavorial Health Tech      Signature:  Antonio Packer

## 2021-01-07 NOTE — BH NOTE
Patient came up to desk requesting nighttime medications. Patient informed he had Risperdal and Trazodone scheduled at this time. Patient became agitated and yelled at RN stating \" I only take Abilify. They said it would be in there. Pull up the chart and get a doctor here right now. We've gone through this enough. Get a doctor here. If you don't get someone here this is going to be a big issue and you do not want that. The FBI will be here tomorrow to deal with it. \" Patient informed that Risperdal and Trazodone are all that are ordered at this time. Patient encouraged to speak to physician about medication requests. Patient offered PRN medication for anxiety and declined. Patient states \" I don't take Risperdal, I'll come get the trazodone before 2 am if I can't sleep\". Patient then leaves nurses station.

## 2021-01-07 NOTE — PROGRESS NOTES
BEHAVIORAL HEALTH FOLLOW-UP NOTE     1/7/2021     Patient was seen and examined in person, Chart reviewed   Patient's case discussed with staff/team    Chief Complaint: Acute psychosis    Interim History:   Knocked on patient's door and entered to find him naked in room and masturbating. Return to her room after 5 minutes patient was then covered with blanket, appeared to be naked under a blanket. Per staff patient has been refusing his Risperdal, he reports \"I do not take this medication, I need to take Abilify because Dia told me this is what I should be on\". He continues to be intrusive and argumentative with staff. He has multiple threats of suing hospital.  We discussed today the Voltaren tablets are not on the formulary in the hospital, offered to call his family to see if they would bring in his tablets from home, patient demanded that this writer pay for these out of her pocket. Explained to patient that formulary substitute for oral Voltaren is Motrin 600 mg. Patient then accused staff of not giving him his topical Voltaren gel, reminded him that he had refused it last night and he had taken it this morning. He replied he did not need it last night, and that he lost the gel this morning demanding that this writer \"get me more gel now\". Discussed with patient his refusal of Risperdal, he is adamant that he needs to be on Abilify because that was covered by insurance, and refuses to take Risperdal.  He was argumentative and intrusive and attempted numerous times to redirect conversation, terminated interview at this point.     Appetite:   [x] Normal/Unchanged  [] Increased  [] Decreased      Sleep:       [x] Normal/Unchanged  [] Fair       [] Poor              Energy:    [x] Normal/Unchanged  [] Increased  [] Decreased        Aggression:  [x] yes  [] no    Patient is [x] able  [] unable to CONTRACT FOR SAFETY ON THE UNIT    PAST MEDICAL/PSYCHIATRIC HISTORY:   Past Medical History:   Diagnosis Date  Arthritis     Bilateral posterior subcapsular polar cataract     Bipolar 1 disorder (HCC)     Brief psychotic disorder (HCC)     Chronic pain of right knee     Hypertension     Tinnitus of both ears        FAMILY/SOCIAL HISTORY:  No family history on file. Social History     Socioeconomic History    Marital status: Single     Spouse name: Not on file    Number of children: Not on file    Years of education: Not on file    Highest education level: Not on file   Occupational History    Not on file   Social Needs    Financial resource strain: Not on file    Food insecurity     Worry: Not on file     Inability: Not on file    Transportation needs     Medical: Not on file     Non-medical: Not on file   Tobacco Use    Smoking status: Former Smoker     Types: Cigars     Quit date: 2020     Years since quittin.9    Smokeless tobacco: Never Used   Substance and Sexual Activity    Alcohol use: No    Drug use: No    Sexual activity: Never   Lifestyle    Physical activity     Days per week: Not on file     Minutes per session: Not on file    Stress: Not on file   Relationships    Social connections     Talks on phone: Not on file     Gets together: Not on file     Attends Caodaism service: Not on file     Active member of club or organization: Not on file     Attends meetings of clubs or organizations: Not on file     Relationship status: Not on file    Intimate partner violence     Fear of current or ex partner: Not on file     Emotionally abused: Not on file     Physically abused: Not on file     Forced sexual activity: Not on file   Other Topics Concern    Not on file   Social History Narrative    Not on file           ROS:  [x] All negative/unchanged except if checked.  Explain positive(checked items) below:  [] Constitutional  [] Eyes  [] Ear/Nose/Mouth/Throat  [] Respiratory  [] CV  [] GI  []   [x] Musculoskeletal  [] Skin/Breast  [] Neurological  [] Endocrine  [] Heme/Lymph  [] Allergic/Immunologic    Explanation: Joint ache    MEDICATIONS:    Current Facility-Administered Medications:     ARIPiprazole (ABILIFY) tablet 15 mg, 15 mg, Oral, Daily, BETTY Hunter - CNP    diclofenac sodium (VOLTAREN) 1 % gel 2 g, 2 g, Topical, 4x Daily, Heena Nicely, APRN - CNP, 2 g at 21 1220    ibuprofen (ADVIL;MOTRIN) tablet 600 mg, 600 mg, Oral, Q6H PRN, BETTY Barron - CNP, 600 mg at 21 0210    traZODone (DESYREL) tablet 50 mg, 50 mg, Oral, Nightly, BETTY Barron - CNP, 50 mg at 21 2125    lamoTRIgine (LAMICTAL) tablet 200 mg, 200 mg, Oral, Daily, Charisse Young MD, 200 mg at 21 0831    acetaminophen (TYLENOL) tablet 650 mg, 650 mg, Oral, Q4H PRN, Oliver Olson MD, 650 mg at 21 1219    aluminum & magnesium hydroxide-simethicone (MAALOX) 200-200-20 MG/5ML suspension 30 mL, 30 mL, Oral, Q6H PRN, Oliver Olson MD    hydrOXYzine (ATARAX) tablet 50 mg, 50 mg, Oral, TID PRN, Oliver Olson MD, 50 mg at 21 2214    nicotine polacrilex (NICORETTE) gum 2 mg, 2 mg, Oral, PRN, Oliver Olson MD    polyethylene glycol (GLYCOLAX) packet 17 g, 17 g, Oral, Daily PRN, Oliver Olson MD    lisinopril (PRINIVIL;ZESTRIL) tablet 20 mg, 20 mg, Oral, Daily, Charisse Young MD, 20 mg at 21 8256    haloperidol lactate (HALDOL) injection 5 mg, 5 mg, Intramuscular, Q4H PRN **OR** haloperidol (HALDOL) tablet 5 mg, 5 mg, Oral, Q4H PRN, Charisse Young MD, 5 mg at 20 1159    LORazepam (ATIVAN) tablet 1 mg, 1 mg, Oral, Q4H PRN, 1 mg at 20 1159 **OR** LORazepam (ATIVAN) injection 1 mg, 1 mg, Intramuscular, Q4H PRN, Charisse Young MD      Examination:  /85   Pulse 70   Temp 98.1 °F (36.7 °C) (Oral)   Resp 14   SpO2 96%   Gait - steady  Medication side effects(SE): none.      Mental Status Examination:    Level of consciousness:  within normal limits   Appearance: Naked covered with blanket with fair grooming and Therapeutic interview  [x] Supportive  [] CBT  [] Ongoing  [] Other    [x] Patient continues to need, on a daily basis, active treatment furnished directly by or requiring the supervision of inpatient psychiatric personnel      Anticipated Length of stay: Indeterminate                                       --BETTY Crabtree - CNP on 1/7/2021 at 1:19 PM    An electronic signature was used to authenticate this note. Psychiatry Attending Attestation     I independently saw and evaluated the patient. I reviewed the nurse practitioner's documentation above. Any additional comments or changes to the nurse practitioners documentation are stated below otherwise agree with assessment. Patient refused to take his Risperdal today. He wanted to take Abilify and mentioned that he discussed this with Dr. Beau Lorenzana. Continues to have significant thought disorganization. Has very poor attention and concentration. Continues to be labile here on the unit. Will discontinue Risperdal and start him on Abilify.        Electronically signed by Kelsi Altman MD on 1/7/21 at 8:49 PM EST

## 2021-01-07 NOTE — PLAN OF CARE
Problem: Altered Mood, Deterioration in Function:  Goal: Able to verbalize reality based thinking  Description: Able to verbalize reality based thinking  1/7/2021 1024 by Anne-Marie Rosales RN  Outcome: Ongoing  Patient is unable to verbalize reality based thinking at this time. Patient is preoccupied with the Dr Promise Lantigua me the wrong medications, I am going to gris you guys. \"     Problem: Altered Mood, Psychotic Behavior:  Goal: Able to verbalize decrease in frequency and intensity of hallucinations  Description: Able to verbalize decrease in frequency and intensity of hallucinations  1/7/2021 1024 by Anne-Marie Rosales RN  Outcome: Ongoing  Patient is observed in day area. Patient is hostile, labile, and irritable on approach. Patient is currently denying thoughts of wanting to harm self or others. Patient is anxious as evidenced by yelling at [address], telling writer she is giving patient the wrong medication, and he is going to gris us. Patient is labile, demanding to talk to Dr, Charge Nurse, and Manager. Patient refuses to take Risperdal, but does take other scheduled medications. Sleep and appetite adequate. Patient hygiene is poor, was encouraged to shower daily and attend to ADL's. Patient refused physical assessment from writer. Patient is interactive with patient, attends unit programming. No further concerns voiced. Will continue to monitor.

## 2021-01-07 NOTE — GROUP NOTE
Group Therapy Note    Date: 1/7/2021    Group Start Time: 1000  Group End Time: 8083  Group Topic: Psychotherapy    [facility]    KRISTY Maya        Group Therapy Note    Attendees: 5/15         Patient's Goal:  Expression of feeling     Notes:  Pt needed frequent redirection to operate from \"I\" perspective    Status After Intervention:  Improved    Participation Level:  Active Listener and Interactive    Participation Quality: Appropriate and Attentive      Speech:  normal      Thought Process/Content: Logical      Affective Functioning: Congruent      Mood: euthymic      Level of consciousness:  Alert and Oriented x4      Response to Learning: Able to verbalize current knowledge/experience and Able to verbalize/acknowledge new learning      Endings: None Reported    Modes of Intervention: Education and Support      Discipline Responsible: /Counselor      Signature:  KRISTY Maya

## 2021-01-07 NOTE — GROUP NOTE
Group Therapy Note    Date: 1/7/2021    Group Start Time: 1330  Group End Time: 3991  Group Topic: Music Therapy    [facility]    Karina Ramírez        Group Therapy Note    Attendees: 8/15         Patient's Goal:  Patients dedicated songs to others (family, friends, ex's, and more), and shared the message they would like person to take from the song they shared. Notes:  Patient attended and participated in group, engaging in conversations when appropriate, and being attentive to others song choices and conversations. Shared the song      Patient's Goal:  Patients dedicated songs to others (family, friends, ex's, and more), and shared the message they would like person to take from the song they shared. Notes:  Patient attended and participated in group, engaging in conversations when appropriate, and being attentive to others song choices and conversations. Shared the [facility] and dedicated it to [employer]"    Status After Intervention:  Improved    Participation Level:  Active Listener and Interactive    Participation Quality: Appropriate, Attentive and Sharing      Speech:  normal      Thought Process/Content: Logical  Linear      Affective Functioning: Congruent      Mood: euthymic      Level of consciousness:  Alert and Attentive      Response to Learning: Progressing to goal      Endings: None Reported      Modes of Intervention: Socialization, Exploration, Activity, Media and Reality-testing      Discipline Responsible: Psychoeducational Specialist      Signature:  Karina Ramírez

## 2021-01-07 NOTE — PLAN OF CARE
Problem: Altered Mood, Deterioration in Function:  Goal: Able to verbalize reality based thinking  Description: Able to verbalize reality based thinking  1/7/2021 0349 by Cedrick Blackwell RN  Outcome: Ongoing  Patient is unable to verbalize reality based thinking. Patient refused nighttime medications saying he does not take those medications he only takes Abilify. Patient tells staff the FBI will be here tomorrow to deal with it.

## 2021-01-07 NOTE — FLOWSHEET NOTE
*Patient participated in the [address] [place] [address]       01/07/21 1525   Encounter Summary   Services provided to: Patient   Referral/Consult From: Rounding   Continue Visiting   (1/7/21)   Complexity of Encounter Moderate   Length of Encounter 30 minutes   Spiritual Assessment Completed Yes   Spiritual/[facility]aritan   Type Spiritual support   Assessment Calm; Approachable   Intervention Active listening;Prayer   Outcome Receptive;Engaged in conversation

## 2021-01-08 ENCOUNTER — TELEPHONE (OUTPATIENT)
Dept: PSYCHIATRY | Age: 56
End: 2021-01-08

## 2021-01-08 PROCEDURE — 6370000000 HC RX 637 (ALT 250 FOR IP): Performed by: NURSE PRACTITIONER

## 2021-01-08 PROCEDURE — 1240000000 HC EMOTIONAL WELLNESS R&B

## 2021-01-08 PROCEDURE — 6370000000 HC RX 637 (ALT 250 FOR IP): Performed by: PSYCHIATRY & NEUROLOGY

## 2021-01-08 PROCEDURE — 99232 SBSQ HOSP IP/OBS MODERATE 35: CPT | Performed by: PSYCHIATRY & NEUROLOGY

## 2021-01-08 RX ORDER — ARIPIPRAZOLE 20 MG/1
20 TABLET ORAL DAILY
Status: DISCONTINUED | OUTPATIENT
Start: 2021-01-09 | End: 2021-01-09

## 2021-01-08 RX ADMIN — DICLOFENAC SODIUM 2 G: 10 GEL TOPICAL at 08:44

## 2021-01-08 RX ADMIN — DICLOFENAC SODIUM 2 G: 10 GEL TOPICAL at 21:58

## 2021-01-08 RX ADMIN — LAMOTRIGINE 200 MG: 100 TABLET ORAL at 08:45

## 2021-01-08 RX ADMIN — DICLOFENAC SODIUM 2 G: 10 GEL TOPICAL at 15:02

## 2021-01-08 RX ADMIN — ARIPIPRAZOLE 15 MG: 15 TABLET ORAL at 08:45

## 2021-01-08 RX ADMIN — LISINOPRIL 20 MG: 20 TABLET ORAL at 08:45

## 2021-01-08 RX ADMIN — TRAZODONE HYDROCHLORIDE 50 MG: 50 TABLET ORAL at 00:38

## 2021-01-08 RX ADMIN — ACETAMINOPHEN 650 MG: 325 TABLET, FILM COATED ORAL at 08:59

## 2021-01-08 RX ADMIN — IBUPROFEN 600 MG: 600 TABLET, FILM COATED ORAL at 21:57

## 2021-01-08 RX ADMIN — IBUPROFEN 600 MG: 600 TABLET, FILM COATED ORAL at 07:17

## 2021-01-08 ASSESSMENT — PAIN SCALES - GENERAL
PAINLEVEL_OUTOF10: 0
PAINLEVEL_OUTOF10: 4
PAINLEVEL_OUTOF10: 5

## 2021-01-08 ASSESSMENT — PAIN - FUNCTIONAL ASSESSMENT: PAIN_FUNCTIONAL_ASSESSMENT: 0-10

## 2021-01-08 NOTE — GROUP NOTE
Group Therapy Note    Date: 1/8/2021    Group Start Time: 1600  Group End Time: 36  Group Topic: Healthy Living/Wellness    [facility]    Fortino Toro RN        Group Therapy Note    Attendees: 09         Patient's Goal:  To identify positive coping skills and promote self esteem    Notes:  Joined half way through group    Status After Intervention:  Unchanged    Participation Level: Minimal    Participation Quality: Inappropriate      Speech:  normal      Thought Process/Content: Linear      Affective Functioning: Exaggerated      Mood: euthymic      Level of consciousness:  Alert      Response to Learning: Able to retain information and Capable of insight      Endings: None Reported    Modes of Intervention: Support, Socialization and Activity      Discipline Responsible: Registered Nurse      Signature:   Fortino Toro RN

## 2021-01-08 NOTE — PROGRESS NOTES
BEHAVIORAL HEALTH FOLLOW-UP NOTE     1/8/2021     Patient was seen and examined in person, Chart reviewed   Patient's case discussed with staff/team    Chief Complaint: Acute psychosis    Interim History:   Staff patient has maintained that patient has maintained medication adherence last evening and this morning and has been without acute behavioral changes that have required emergency medications. On approach patient is agreeable to interaction while sitting at a table in the milieu however states \" I have seen doctors all day, I certainly do not need you\". He continues with information related to Dr. Tano Odom and the inconsistencies of our team. He is difficult to redirect and is dismissive related to assessment questions. He is fixated on his sister \"Victoria Villanueva\" who will not answer his calls. He has multiple threats of suing hospital.   because that was covered by insurance, and refuses to take Risperdal.  Patient denies questions or concerns related to this author stating \" I can't imagine you would have the required answers\". Staff report the patient has maintained safety while continuing to monitor mood closely. Appetite:   [x] Normal/Unchanged  [] Increased  [] Decreased      Sleep:       [x] Normal/Unchanged  [] Fair       [] Poor              Energy:    [x] Normal/Unchanged  [] Increased  [] Decreased        Aggression:  [x] yes  [] no    Patient is [x] able  [] unable to CONTRACT FOR SAFETY ON THE UNIT    PAST MEDICAL/PSYCHIATRIC HISTORY:   Past Medical History:   Diagnosis Date    Arthritis     Bilateral posterior subcapsular polar cataract     Bipolar 1 disorder ([address]     Brief psychotic disorder ([address]     Chronic pain of right knee     Hypertension     Tinnitus of both ears        FAMILY/SOCIAL HISTORY:  No family history on file.   Social History     Socioeconomic History    Marital status: Single     Spouse name: Not on file    Number of children: Not on file    Years of education: Not on file    Highest education level: Not on file   Occupational History    Not on file   Social Needs    Financial resource strain: Not on file    Food insecurity     Worry: Not on file     Inability: Not on file    Transportation needs     Medical: Not on file     Non-medical: Not on file   Tobacco Use    Smoking status: Former Smoker     Types: Cigars     Quit date: 2020     Years since quittin.9    Smokeless tobacco: Never Used   Substance and Sexual Activity    Alcohol use: No    Drug use: No    Sexual activity: Never   Lifestyle    Physical activity     Days per week: Not on file     Minutes per session: Not on file    Stress: Not on file   Relationships    Social connections     Talks on phone: Not on file     Gets together: Not on file     Attends Episcopal service: Not on file     Active member of club or organization: Not on file     Attends meetings of clubs or organizations: Not on file     Relationship status: Not on file    Intimate partner violence     Fear of current or ex partner: Not on file     Emotionally abused: Not on file     Physically abused: Not on file     Forced sexual activity: Not on file   Other Topics Concern    Not on file   Social History Narrative    Not on file           ROS:  [x] All negative/unchanged except if checked.  Explain positive(checked items) below:  [] Constitutional  [] Eyes  [] Ear/Nose/Mouth/Throat  [] Respiratory  [] CV  [] GI  []   [x] Musculoskeletal  [] Skin/Breast  [] Neurological  [] Endocrine  [] Heme/Lymph  [] Allergic/Immunologic    Explanation: Joint ache    MEDICATIONS:    Current Facility-Administered Medications:     [START ON 2021] ARIPiprazole (ABILIFY) tablet 20 mg, 20 mg, Oral, Daily, BETTY Carter - CNP    diclofenac sodium (VOLTAREN) 1 % gel 2 g, 2 g, Topical, 4x Daily, BETTY Nolasco - CNP, 2 g at 21 1502    ibuprofen (ADVIL;MOTRIN) tablet 600 mg, 600 mg, Oral, Q6H PRN, Chel Fitzpatrick APRN - CNP, 600 mg at 01/08/21 0717    traZODone (DESYREL) tablet 50 mg, 50 mg, Oral, Nightly, BETTY Smith - CNP, 50 mg at 01/08/21 0038    lamoTRIgine (LAMICTAL) tablet 200 mg, 200 mg, Oral, Daily, Nita Michel MD, 200 mg at 01/08/21 0845    acetaminophen (TYLENOL) tablet 650 mg, 650 mg, Oral, Q4H PRN, Pastora Chirinos MD, 650 mg at 01/08/21 0859    aluminum & magnesium hydroxide-simethicone (MAALOX) 200-200-20 MG/5ML suspension 30 mL, 30 mL, Oral, Q6H PRN, Pastora Chirinos MD    hydrOXYzine (ATARAX) tablet 50 mg, 50 mg, Oral, TID PRN, Pastora Chirinos MD, 50 mg at 01/01/21 2214    nicotine polacrilex (NICORETTE) gum 2 mg, 2 mg, Oral, PRN, Pastora Chirinos MD    polyethylene glycol (GLYCOLAX) packet 17 g, 17 g, Oral, Daily PRN, Pastora Chirinos MD    lisinopril (PRINIVIL;ZESTRIL) tablet 20 mg, 20 mg, Oral, Daily, Nita Michel MD, 20 mg at 01/08/21 0845    haloperidol lactate (HALDOL) injection 5 mg, 5 mg, Intramuscular, Q4H PRN **OR** haloperidol (HALDOL) tablet 5 mg, 5 mg, Oral, Q4H PRN, Nita Michel MD, 5 mg at 12/31/20 1159    LORazepam (ATIVAN) tablet 1 mg, 1 mg, Oral, Q4H PRN, 1 mg at 12/31/20 1159 **OR** LORazepam (ATIVAN) injection 1 mg, 1 mg, Intramuscular, Q4H PRN, Nita Michel MD      Examination:  /85   Pulse 70   Temp 97.8 °F (36.6 °C) (Oral)   Resp 16   SpO2 96%   Gait - steady  Medication side effects(SE): none.      Mental Status Examination:    Level of consciousness:  within normal limits   Appearance: Personal attire, with fair grooming and hygiene  Behavior/Motor: Hostile, no motor agitation  Attitude toward examiner: Intense eye contact and hostile  Speech:  hyperverbal and interrupting   Mood: irritable  Affect:  angry  Thought processes:  rapid, loose associations  Thought content:  Homicidal ideation - none  Suicidal Ideation:  denies suicidal ideation  Delusions:  persecutory and grandiose  Perceptual Disturbance:  denies any perceptual disturbance  Cognition: oriented to person, place, and time   Concentration distractible  Insight poor   Judgement poor     ASSESSMENT:   Patient symptoms are:  [] Well controlled  [] Improving  [] Worsening  [x] No change      Diagnosis:   Active Problems:    Kalie ([address]  Resolved Problems:    * No resolved hospital problems. *      LABS:    No results for input(s): WBC, HGB, PLT in the last 72 hours. No results for input(s): NA, K, CL, CO2, BUN, CREATININE, GLUCOSE in the last 72 hours. No results for input(s): BILITOT, ALKPHOS, AST, ALT in the last 72 hours. No results found for: Beau David, LABBENZ, CANNAB, COCAINESCRN, LABMETH, OPIATESCREENURINE, PHENCYCLIDINESCREENURINE, PPXUR, ETOH  Lab Results   Component Value Date    TSH 1.06 01/01/2021     No results found for: LITHIUM  No results found for: VALPROATE, CBMZ    RISK ASSESSMENT: Low risk of harm to self, Low risk of harm to others. Low to moderate risk of aggression. Treatment Plan:  Reviewed current Medications with the patient. Increase Abilify 20 mg (patient was on 15 mg last dose was 1/8/21)  Monitor for need and use of as needed medications    Risks, benefits, side effects, drug-to-drug interactions and alternatives to treatment were discussed. The patient has consented to treatment. Encourage patient to attend group and other milieu activities.   Discharge planning discussed with the patient and treatment team.  Follow-up daily while inpatient  Attempt made to contact patient's partners Andrea Wayland no answer at [phone]    PSYCHOTHERAPY/COUNSELING:  [] Therapeutic interview  [x] Supportive  [] CBT  [] Ongoing  [] Other    [x] Patient continues to need, on a daily basis, active treatment furnished directly by or requiring the supervision of inpatient psychiatric personnel      Anticipated Length of stay: Indeterminate                                       --BETTY Brown CNP on 1/8/2021 at 4:10 PM    An electronic signature was used to authenticate this note. Psychiatry Attending Attestation     I independently saw and evaluated the patient. I reviewed the nurse practitioner's documentation above. Any additional comments or changes to the nurse practitioners documentation are stated below otherwise agree with assessment. Continues to have significant thought disorganization. Was very irritable with mood elevation today. He could not follow through with his thought process. Has very poor attention and concentration. No insight into his mental health. We will continue to titrate his Abilify.        Electronically signed by Morgan Bowman MD on 1/8/21 at 10:20 PM EST

## 2021-01-08 NOTE — TELEPHONE ENCOUNTER
Amanda Adkins from [facility] called the office to schedule follow up care for Melly Knowles. He is a former patient of Dr. Froylan Paez. She was informed that Dr. Froylan Paez is no longer with the practice and that we would have to see if you would be agreeable to accepting his case. (Please review recent documentation from telephone calls regarding this patient.)    Amanda Adkins was also trying to see if patient could be scheduled with [facility]. They are also trying to help patient's sister obtain guardianship. Please advise if you would like to accept or if patient should proceed with [facility].

## 2021-01-08 NOTE — GROUP NOTE
Group Therapy Note    Date: 1/8/2021    Group Start Time: 8728  Group End Time: 0915  Group Topic: Community Meeting    MADELEINE LIDIA GALICIA    [place], [place]    Attendees: 12         Patient's Goal:  To verbalize obtainable an short term goal pertaining to mental health and stability that can be achieved today. To be informed of programming schedule and reminded on unit rules / guidelines. Notes:  Patient verbalized goal for today to work on discharge planning. Status After Intervention:  Improved    Participation Level:  Active Listener and Interactive    Participation Quality: Appropriate, Attentive and Sharing      Speech:  normal      Thought Process/Content: Flight of ideas, Tangential      Affective Functioning: Congruent      Mood: euthymic      Level of consciousness:  Alert, Oriented x4 and Attentive      Response to Learning: Able to verbalize current knowledge/experience, Able to verbalize/acknowledge new learning, Able to retain information, Capable of insight and Progressing to goal      Endings: None Reported       Modes of Intervention: Support, Socialization, Exploration, Clarifying, Problem-solving, Confrontation, Limit-setting and Reality-testing      Discipline Responsible: Psychoeducational Specialist      Signature:  Ruy Hammond

## 2021-01-08 NOTE — GROUP NOTE
Group Therapy Note    Date: 1/8/2021    Group Start Time: 1345  Group End Time: 5211  Group Topic: Psychoeducation    OSMEL GALICIA    [place], [place]    Attendees: 7         Patient's Goal:  To be educated on facts vs myths of mental illness in order to better be able to advocate for self. Notes:  Patient needed frequent redirection and limit setting due to intrusive and monopolizing behaviors. Patient was angry, challenging towards writer, demeaning and argumentative with others. Patient was entitled and grandiose. Patient made rude statements towards writer and peers, looking for a reaction. Patient was minimally receptive to redirection given.      Status After Intervention:  Unchanged    Participation Level: Monopolizing    Participation Quality: Inappropriate and Intrusive      Speech:  loud      Thought Process/Content: Perseverating      Affective Functioning: Congruent      Mood: angry and irritable      Level of consciousness:  Alert and Preoccupied      Response to Learning: Able to verbalize current knowledge/experience, Capable of insight and Resistant      Endings: None Reported       Modes of Intervention: Education, Socialization, Exploration, Clarifying, Problem-solving, Activity, Confrontation, Limit-setting and Reality-testing      Discipline Responsible: Psychoeducational Specialist      Signature:  Krys Bowman

## 2021-01-08 NOTE — PLAN OF CARE
Problem: Altered Mood, Psychotic Behavior:  Goal: Able to verbalize decrease in frequency and intensity of hallucinations  Description: Able to verbalize decrease in frequency and intensity of hallucinations  1/8/2021 1306 by Clarke Pimentel RN  Outcome: Ongoing  Patient is alert, observed in day area. Patient is currently denying thoughts of wanting to harm self or others. Patient mood is more stable today, less irritable, more friendly to staff. Patient is medication compliant, denies any side effects. Sleep and appetite adequate. Patient is compliant with going to groups. Patient encouraged to attend to ADL's. No further concerns voiced. Will continue to monitor. Problem: Pain:  Goal: Pain level will decrease  Description: Pain level will decrease  Outcome: Ongoing  Patient is admitting to generalized pain, see flow sheet. Patient is compliant with Voltaren Gel, Motrin and Tylenol.

## 2021-01-08 NOTE — CARE COORDINATION
Counselor left vm with Team P regarding patient would like to speak with SW about his sister and why he is here.

## 2021-01-08 NOTE — GROUP NOTE
Group Therapy Note    Date: January 8, 2021     Group Start Time: 1000                     Group End Time: 5453     Group Topic: Psychotherapy     David Brito , [address], [place]     Group Therapy Note     Attendees: 10/15    Patient's Goal: develop stress management / identify triggers and safety planning / discharge planning/ community resources / family support system     Notes:  participated in group      Status After Intervention:  Improved     Participation Level:  Active Listener and Interactive, intrusive at times      Participation Quality: Appropriate, Attentive and Sharing     Speech:  Pressured at times     Thought Process/Content: Linear, preoccupied at times     Affective Functioning: Constricted     Mood: Anxious     Level of consciousness:  Alert, Oriented x4 and Attentive     Response to Learning: Able to retain information, Capable of insight, Able to change behavior and Progressing to goal     Endings: None Reported     Modes of Intervention: Support, Exploration, Clarifying and Problem-solving     Discipline Responsible: Licensed Professional Clinical Counselor     Signature:  Sydnee Pritchett [facility], [address], [facility]

## 2021-01-08 NOTE — GROUP NOTE
Group Therapy Note    Date: 1/8/2021    Group Start Time: 1100  Group End Time: 5971  Group Topic: Recreational    [facility], [address]    Attendees: 10         Patient's Goal:  To demonstrate increased interpersonal skills. Notes:  Patient attended group and actively participated in task at hand. Patient conversed appropriately with peers and Kanwal Hager. Status After Intervention:  Improved    Participation Level:  Active Listener and Interactive    Participation Quality: Appropriate, Attentive and Sharing      Speech:  normal      Thought Process/Content: Logical      Affective Functioning: Congruent      Mood: euthymic      Level of consciousness:  Alert, Oriented x4 and Attentive      Response to Learning: Able to verbalize current knowledge/experience, Able to verbalize/acknowledge new learning, Able to retain information, Capable of insight and Progressing to goal      Endings: None Reported       Modes of Intervention: Socialization, Exploration, Clarifying, Problem-solving, Activity, Limit-setting and Reality-testing      Discipline Responsible: Psychoeducational Specialist      Signature:  Elaine Gar

## 2021-01-08 NOTE — PLAN OF CARE
appointments. PLAN/TREATMENT RECOMMENDATIONS UPDATE:   Continue with group therapies, education of coping skills   Continue to monitor patient on unit. Medications provided/ medication compliance by patient. Continue for plans to obtain long term goals after discharge.     SHORT-TERM GOALS UPDATE:  Time frame for Short-Term Goals: 8-14days     LONG-TERM GOALS UPDATE:  Time frame for Long-Term Goals: 6 months  Members Present in Team Meeting: See Signature Sheet    Jesse Gonsales, [address]

## 2021-01-09 PROCEDURE — 6370000000 HC RX 637 (ALT 250 FOR IP): Performed by: PSYCHIATRY & NEUROLOGY

## 2021-01-09 PROCEDURE — 1240000000 HC EMOTIONAL WELLNESS R&B

## 2021-01-09 PROCEDURE — 99232 SBSQ HOSP IP/OBS MODERATE 35: CPT | Performed by: PSYCHIATRY & NEUROLOGY

## 2021-01-09 RX ORDER — ARIPIPRAZOLE 30 MG/1
30 TABLET ORAL DAILY
Status: DISCONTINUED | OUTPATIENT
Start: 2021-01-10 | End: 2021-01-18 | Stop reason: HOSPADM

## 2021-01-09 RX ADMIN — DICLOFENAC SODIUM 2 G: 10 GEL TOPICAL at 17:25

## 2021-01-09 RX ADMIN — LAMOTRIGINE 200 MG: 100 TABLET ORAL at 08:16

## 2021-01-09 RX ADMIN — ARIPIPRAZOLE 20 MG: 20 TABLET ORAL at 08:16

## 2021-01-09 RX ADMIN — DICLOFENAC SODIUM 2 G: 10 GEL TOPICAL at 21:32

## 2021-01-09 RX ADMIN — DICLOFENAC SODIUM 2 G: 10 GEL TOPICAL at 09:02

## 2021-01-09 RX ADMIN — ACETAMINOPHEN 650 MG: 325 TABLET, FILM COATED ORAL at 08:19

## 2021-01-09 RX ADMIN — LISINOPRIL 20 MG: 20 TABLET ORAL at 08:16

## 2021-01-09 RX ADMIN — ACETAMINOPHEN 650 MG: 325 TABLET, FILM COATED ORAL at 21:32

## 2021-01-09 ASSESSMENT — PAIN SCALES - GENERAL
PAINLEVEL_OUTOF10: 1
PAINLEVEL_OUTOF10: 0

## 2021-01-09 NOTE — BH NOTE
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 - Client begins to talk about his medications and writer informs him this is a topic between him and his doctor. Client continues to state \"I know everything there is to know about what I should and shouldn't take. \"  He continues to state [place]. Sussy Lopez [place] \"messed up my medicine and I had to call the [facility] and [facility]'s police officers to help me get him out of business\". - Writer continues in attempts to get him to concentrate his plan at discharge. - Client eventually states \"I want to go to [facility]'s housing program.  I understand I need to go to the [facility] for a few days and then they can help me\". Writer speaks with client about how Cristine Sousa is in [facility] and their housing program and to get linked for new services is going to take time. Writer encourages client to talk about a plan that has been discussed in the past.  - Client states \"I can go home and take care of my domestic partner\" and get Dr. Sussy Lopez to put me back on Risperdal\". Client states has been on Risperdal for over 17-years and \"I know it works for [facility]". 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 - Writer will continue to work with client and clarify plan at time of discharge.

## 2021-01-09 NOTE — GROUP NOTE
Group Therapy Note    Date: 1/9/2021    Group Start Time: 1600  Group End Time: 492 0899  Group Topic: Music Therapy    OSMEL MURILLO G    Earline Horne LPN        Group Therapy Note    Attendees: 5         Patient's Goal:  coping    Notes:  n/a    Status After Intervention:  Improved    Participation Level:  Active Listener    Participation Quality: Appropriate      Speech:  normal      Thought Process/Content: Logical      Affective Functioning: Flat      Mood: normal      Level of consciousness:  Alert and Oriented x4      Response to Learning: Able to verbalize current knowledge/experience      Endings: None Reported    Modes of Intervention: Media      Discipline Responsible: Licensed Practical Nurse      Signature:  Earline Horne LPN

## 2021-01-09 NOTE — PROGRESS NOTES
Quit date: 2020     Years since quittin.9    Smokeless tobacco: Never Used   Substance and Sexual Activity    Alcohol use: No    Drug use: No    Sexual activity: Never   Lifestyle    Physical activity     Days per week: Not on file     Minutes per session: Not on file    Stress: Not on file   Relationships    Social connections     Talks on phone: Not on file     Gets together: Not on file     Attends Jew service: Not on file     Active member of club or organization: Not on file     Attends meetings of clubs or organizations: Not on file     Relationship status: Not on file    Intimate partner violence     Fear of current or ex partner: Not on file     Emotionally abused: Not on file     Physically abused: Not on file     Forced sexual activity: Not on file   Other Topics Concern    Not on file   Social History Narrative    Not on file           ROS:  [x] All negative/unchanged except if checked.  Explain positive(checked items) below:  [] Constitutional  [] Eyes  [] Ear/Nose/Mouth/Throat  [] Respiratory  [] CV  [] GI  []   [x] Musculoskeletal  [] Skin/Breast  [] Neurological  [] Endocrine  [] Heme/Lymph  [] Allergic/Immunologic    Explanation: Joint ache    MEDICATIONS:    Current Facility-Administered Medications:     ARIPiprazole (ABILIFY) tablet 20 mg, 20 mg, Oral, Daily, Corry Velez, APRN - CNP, 20 mg at 21 0816    diclofenac sodium (VOLTAREN) 1 % gel 2 g, 2 g, Topical, 4x Daily, Lisandro Hammre APRN - CNP, 2 g at 21 09    ibuprofen (ADVIL;MOTRIN) tablet 600 mg, 600 mg, Oral, Q6H PRN, Moshe Montanez APRN - CNP, 600 mg at 21 215    lamoTRIgine (LAMICTAL) tablet 200 mg, 200 mg, Oral, Daily, Joya Mullins MD, 200 mg at 21 0816    acetaminophen (TYLENOL) tablet 650 mg, 650 mg, Oral, Q4H PRN, Lorenzo Lopez MD, 650 mg at 21 0819    aluminum & magnesium hydroxide-simethicone (MAALOX) 399-179-70 MG/5ML suspension 30 mL, 30 mL, Oral, Q6H PRN, Doc Dave MD    hydrOXYzine (ATARAX) tablet 50 mg, 50 mg, Oral, TID PRN, Doc Dave MD, 50 mg at 01/01/21 2214    nicotine polacrilex (NICORETTE) gum 2 mg, 2 mg, Oral, PRN, Doc Dave MD    polyethylene glycol (GLYCOLAX) packet 17 g, 17 g, Oral, Daily PRN, Doc Dave MD    lisinopril (PRINIVIL;ZESTRIL) tablet 20 mg, 20 mg, Oral, Daily, Cynthia Sol MD, 20 mg at 01/09/21 0816    haloperidol lactate (HALDOL) injection 5 mg, 5 mg, Intramuscular, Q4H PRN **OR** haloperidol (HALDOL) tablet 5 mg, 5 mg, Oral, Q4H PRN, Cynthia Sol MD, 5 mg at 12/31/20 1159    LORazepam (ATIVAN) tablet 1 mg, 1 mg, Oral, Q4H PRN, 1 mg at 12/31/20 1159 **OR** LORazepam (ATIVAN) injection 1 mg, 1 mg, Intramuscular, Q4H PRN, Cynthia Sol MD      Examination:  BP (!) 144/85   Pulse 68   Temp 98.6 °F (37 °C)   Resp 14   SpO2 96%   Gait - steady  Medication side effects(SE): none. Mental Status Examination:    Level of consciousness:  within normal limits   Appearance: Personal attire, with fair grooming and hygiene  Behavior/Motor: Hostile, no motor agitation  Attitude toward examiner: Intense eye contact and hostile  Speech:  hyperverbal and interrupting   Mood: irritable  Affect:  angry  Thought processes:  rapid, loose associations  Thought content:  Homicidal ideation - none  Suicidal Ideation:  denies suicidal ideation  Delusions:  persecutory and grandiose  Perceptual Disturbance:  denies any perceptual disturbance  Cognition:  oriented to person, place, and time   Concentration distractible  Insight poor   Judgement poor     ASSESSMENT:   Patient symptoms are:  [] Well controlled  [] Improving  [] Worsening  [x] No change      Diagnosis:   Active Problems:    Kalie ([facility] [address]  Resolved Problems:    * No resolved hospital problems. *      LABS:    No results for input(s): WBC, HGB, PLT in the last 72 hours. No results for input(s): NA, K, CL, CO2, BUN, CREATININE, GLUCOSE in the last 72 hours.   No results for input(s): BILITOT, ALKPHOS, AST, ALT in the last 72 hours. No results found for: Beau David, LABBENZ, CANNAB, COCAINESCRN, LABMETH, OPIATESCREENURINE, PHENCYCLIDINESCREENURINE, PPXUR, ETOH  Lab Results   Component Value Date    TSH 1.06 01/01/2021     No results found for: LITHIUM  No results found for: VALPROATE, CBMZ    RISK ASSESSMENT: Low risk of harm to self, Low risk of harm to others. Low to moderate risk of aggression. Treatment Plan:  Reviewed current Medications with the patient. Monitor for need and use of as needed medications    Risks, benefits, side effects, drug-to-drug interactions and alternatives to treatment were discussed. The patient has consented to treatment. Encourage patient to attend group and other milieu activities. Discharge planning discussed with the patient and treatment team.  Follow-up daily while inpatient  Attempt made to contact patient's partners Trivoli Jackhorn no answer at [phone]    PSYCHOTHERAPY/COUNSELING:  [] Therapeutic interview  [x] Supportive  [] CBT  [] Ongoing  [] Other    [x] Patient continues to need, on a daily basis, active treatment furnished directly by or requiring the supervision of inpatient psychiatric personnel      Anticipated Length of stay: Indeterminate                                       --Roxy Shaffer, BETTY - CNP on 1/9/2021 at 12:47 PM    An electronic signature was used to authenticate this note. I independently saw and evaluated the patient. I reviewed the midlevel provider's documentation above. Any additional comments or changes to the midlevel provider's documentation are stated below otherwise agree with assessment. The patient ascribes some of his problems to his partner who he believes is mentally ill. The patient states that the current combination of medication has worked well for him in the past.  He believes that became sick because he was started on risperidone.      Patient's mood remains

## 2021-01-09 NOTE — GROUP NOTE
Group Therapy Note    Date: 1/9/2021    Group Start Time: 1000  Group End Time: 7048  Group Topic: Psychotherapy    OSMEL Priest [facility]        Group Therapy Note    Attendees: 8/14       Patient's Goal: develop stress management / identify triggers and safety planning / discharge planning/ community resources / family support system     Notes:  participated in group      Status After Intervention:  Improved     Participation Level:  Active Listener and Interactive, intrusive at times      Participation Quality: Appropriate, Attentive and Sharing     Speech:  Pressured at times     Thought Process/Content: Linear, preoccupied at times     Affective Functioning: Constricted     Mood: Anxious     Level of consciousness:  Alert, Oriented x4 and Attentive     Response to Learning: Able to retain information, Capable of insight, Able to change behavior and Progressing to goal     Endings: None Reported     Modes of Intervention: Support, Exploration, Clarifying and Problem-solving     Discipline Responsible: Licensed Professional Clinical Counselor     Signature:  [facility], [address], [facility]

## 2021-01-09 NOTE — GROUP NOTE
Group Therapy Note    Date: 1/9/2021    Group Start Time: 0900  Group End Time: 0915  Group Topic: Community Meeting    [facility]    [place], [place]        Group Therapy Note    Attendees: 7/14           Patient's Goal:  To increase interpersonal interaction. Notes:  Pt attended and participated in group. Status After Intervention:  Improved    Participation Level:  Active Listener and Interactive    Participation Quality: Appropriate and Attentive      Speech:  normal      Thought Process/Content: Logical      Affective Functioning: Congruent      Mood: euthymic      Level of consciousness:  Alert and Attentive      Response to Learning: Able to verbalize current knowledge/experience, Able to retain information and Progressing to goal      Endings: None Reported    Modes of Intervention: Education, Support, Socialization, Clarifying and Reality-testing      Discipline Responsible: Psychoeducational Specialist      Signature:  Sully Scales

## 2021-01-09 NOTE — PLAN OF CARE
Problem: Altered Mood, Deterioration in Function:  Goal: Able to verbalize reality based thinking  Description: Able to verbalize reality based thinking  Outcome: Ongoing   Patient unable to verbalize reality based thinking. Patient is medication focused. Patient frequently making comments suing the hospital and the police. Patient frequently talks about the [employer] coming.

## 2021-01-09 NOTE — GROUP NOTE
Group Therapy Note    Date: 1/9/2021    Group Start Time: 1330  Group End Time: 4125  Group Topic: Cognitive Skills    [facility]    [place], [place]        Group Therapy Note    Attendees: 3/13    Pt did not attend RT skills group d/t resting in room despite staff invitation to attend. 1:1 talk time offered as alternative to group session, pt declined.

## 2021-01-09 NOTE — CARE COORDINATION
Counselor left  with Dr Khan team SW staff for patient,  he would like to see SW to work on his discharge plan.

## 2021-01-09 NOTE — PLAN OF CARE
Problem: Altered Mood, Deterioration in Function:  Goal: Able to verbalize reality based thinking  Description: Able to verbalize reality based thinking  1/9/2021 1513 by Scott Flores LPN  Outcome: Ongoing  Pt denies thoughts of self harm and is agreeable to seeking out should thoughts of self harm arise. Safe environment maintained. Q15 minute checks for safety cont per unit policy. Will cont to monitor for safety and provides support and reassurance as needed.

## 2021-01-10 PROCEDURE — 6370000000 HC RX 637 (ALT 250 FOR IP): Performed by: PSYCHIATRY & NEUROLOGY

## 2021-01-10 PROCEDURE — 99232 SBSQ HOSP IP/OBS MODERATE 35: CPT | Performed by: PSYCHIATRY & NEUROLOGY

## 2021-01-10 PROCEDURE — 1240000000 HC EMOTIONAL WELLNESS R&B

## 2021-01-10 RX ADMIN — DICLOFENAC SODIUM 2 G: 10 GEL TOPICAL at 20:49

## 2021-01-10 RX ADMIN — ACETAMINOPHEN 650 MG: 325 TABLET, FILM COATED ORAL at 08:50

## 2021-01-10 RX ADMIN — LISINOPRIL 20 MG: 20 TABLET ORAL at 08:46

## 2021-01-10 RX ADMIN — ARIPIPRAZOLE 30 MG: 30 TABLET ORAL at 08:46

## 2021-01-10 RX ADMIN — LAMOTRIGINE 200 MG: 100 TABLET ORAL at 08:45

## 2021-01-10 RX ADMIN — HYDROXYZINE HYDROCHLORIDE 50 MG: 50 TABLET, FILM COATED ORAL at 01:00

## 2021-01-10 ASSESSMENT — PAIN DESCRIPTION - ORIENTATION: ORIENTATION: RIGHT

## 2021-01-10 ASSESSMENT — PAIN SCALES - GENERAL
PAINLEVEL_OUTOF10: 0
PAINLEVEL_OUTOF10: 3
PAINLEVEL_OUTOF10: 6

## 2021-01-10 NOTE — GROUP NOTE
Group Therapy Note    Date: January 10th, 2021     Group Start Time: 1000                     Group End Time: 6662     Group Topic: Psychoeducation     David Cheatham, CRC, [facility]     Group Therapy Note     Attendees: 8/15    Patient's Goal: develop stress management / identify triggers and safety planning / discharge planning/ community resources / family support system     Notes:  participated in group      Status After Intervention:  unchanged     Participation Level:  Active Listener and Interactive     Participation Quality: intrusive / monopolizing     Speech:  Pressured / fast paced      Thought Process/Content: Flights of Ideas / Bizarre     Affective Functioning: Unstable     Mood: Anxious / Hyperactive     Level of consciousness:  Alert, Oriented x4     Response to Learning: not progressing     Endings: None Reported     Modes of Intervention: Support, Exploration, Clarifying and Problem-solving     Discipline Responsible: Licensed Professional Clinical Counselor     Signature:  Louis HARIRS, CRC, [facility]

## 2021-01-10 NOTE — PROGRESS NOTES
BEHAVIORAL HEALTH FOLLOW-UP NOTE     1/10/2021     Patient was seen and examined in person, Chart reviewed   Patient's case discussed with staff/team    Chief Complaint: Acute psychosis    Interim History:   The patient appeared to be less irritable today. He continues to be euphoric. He was appreciative of the care he is receiving. He has been compliant with medications. He believes that 30 mg is the right dose of Abilify for him. He reports that he has benefited from this in the past.  He continues to be unwilling to add any mood stabilizing medication to Abilify. He denies any side effects from Abilify. He has been engaging with his peers and interacting appropriately with them. The patient was not loud or aggressive during the interaction with me today. Appetite:   [x] Normal/Unchanged  [] Increased  [] Decreased      Sleep:       [x] Normal/Unchanged  [] Fair       [] Poor              Energy:    [x] Normal/Unchanged  [] Increased  [] Decreased        Aggression:  [x] yes  [] no    Patient is [x] able  [] unable to CONTRACT FOR SAFETY ON THE UNIT    PAST MEDICAL/PSYCHIATRIC HISTORY:   Past Medical History:   Diagnosis Date    Arthritis     Bilateral posterior subcapsular polar cataract     [facility] ([facility] [address]     Brief psychotic disorder ([facility] [address]     Chronic pain of right knee     Hypertension     Tinnitus of both ears        FAMILY/SOCIAL HISTORY:  No family history on file.   Social History     Socioeconomic History    Marital status: Single     Spouse name: Not on file    Number of children: Not on file    Years of education: Not on file    Highest education level: Not on file   Occupational History    Not on file   Social Needs    Financial resource strain: Not on file    Food insecurity     Worry: Not on file     Inability: Not on file    Transportation needs     Medical: Not on file     Non-medical: Not on file   Tobacco Use    Smoking status: Former Smoker     Types: Cigars Quit date: 2020     Years since quittin.9    Smokeless tobacco: Never Used   Substance and Sexual Activity    Alcohol use: No    Drug use: No    Sexual activity: Never   Lifestyle    Physical activity     Days per week: Not on file     Minutes per session: Not on file    Stress: Not on file   Relationships    Social connections     Talks on phone: Not on file     Gets together: Not on file     Attends Spiritism service: Not on file     Active member of club or organization: Not on file     Attends meetings of clubs or organizations: Not on file     Relationship status: Not on file    Intimate partner violence     Fear of current or ex partner: Not on file     Emotionally abused: Not on file     Physically abused: Not on file     Forced sexual activity: Not on file   Other Topics Concern    Not on file   Social History Narrative    Not on file           ROS:  [x] All negative/unchanged except if checked.  Explain positive(checked items) below:  [] Constitutional  [] Eyes  [] Ear/Nose/Mouth/Throat  [] Respiratory  [] CV  [] GI  []   [x] Musculoskeletal  [] Skin/Breast  [] Neurological  [] Endocrine  [] Heme/Lymph  [] Allergic/Immunologic    Explanation: Joint ache    MEDICATIONS:    Current Facility-Administered Medications:     ARIPiprazole (ABILIFY) tablet 30 mg, 30 mg, Oral, Daily, Kavya Vallejo MD, 30 mg at 01/10/21 0846    diclofenac sodium (VOLTAREN) 1 % gel 2 g, 2 g, Topical, 4x Daily, Ralston Gitelman, APRN - CNP, 2 g at 21 2132    ibuprofen (ADVIL;MOTRIN) tablet 600 mg, 600 mg, Oral, Q6H PRN, Tabatha Avery APRN - CNP, 600 mg at 21 2157    lamoTRIgine (LAMICTAL) tablet 200 mg, 200 mg, Oral, Daily, Kavya Vallejo MD, 200 mg at 01/10/21 0845    acetaminophen (TYLENOL) tablet 650 mg, 650 mg, Oral, Q4H PRN, Kade Chong MD, 650 mg at 01/10/21 0850    aluminum & magnesium hydroxide-simethicone (MAALOX) 200-200-20 MG/5ML suspension 30 mL, 30 mL, Oral, Q6H PRN, Natasha Barroso BILITOT, ALKPHOS, AST, ALT in the last 72 hours. No results found for: [place], LABBENZ, CANNAB, COCAINESCRN, LABMETH, OPIATESCREENURINE, PHENCYCLIDINESCREENURINE, PPXUR, ETOH  Lab Results   Component Value Date    TSH 1.06 01/01/2021     No results found for: LITHIUM  No results found for: VALPROATE, CBMZ    RISK ASSESSMENT: Low risk of harm to self, Low risk of harm to others. Low to moderate risk of aggression. Treatment Plan:  Reviewed current Medications with the patient. He is tolerating Abilify 30 mg daily   monitor for need and use of as needed medications    Risks, benefits, side effects, drug-to-drug interactions and alternatives to treatment were discussed. The patient has consented to treatment. Encourage patient to attend group and other milieu activities. Discharge planning discussed with the patient and treatment team.  Follow-up daily while inpatient      PSYCHOTHERAPY/COUNSELING:  [] Therapeutic interview  [x] Supportive  [] CBT  [] Ongoing  [] Other    [x] Patient continues to need, on a daily basis, active treatment furnished directly by or requiring the supervision of inpatient psychiatric personnel      Anticipated Length of stay: Indeterminate                                       --Tomi Curran MD on 1/10/2021 at 1:59 PM    An electronic signature was used to authenticate this note.

## 2021-01-10 NOTE — PLAN OF CARE
Problem: Pain:  Goal: Pain level will decrease  Description: Pain level will decrease  Outcome: Ongoing  Pt. Denies current pain at this time. Problem: Altered Mood, Deterioration in Function:  Goal: Able to verbalize reality based thinking  Description: Able to verbalize reality based thinking  Outcome: Ongoing  Pt is grandiose and talks about [place]. Says he can control elections. Pt. Also says he is probating his significant other to a nursing home because he has Alzheimer's disease. Speech is often pressured. Pt. Is medication compliant. Appropriate self care. Pt did shave today. Denies suicidal thoughts. Problem: Altered Mood, Psychotic Behavior:  Goal: Able to verbalize decrease in frequency and intensity of hallucinations  Description: Able to verbalize decrease in frequency and intensity of hallucinations  Outcome: Ongoing  Pt denies hallucinations. Pt jumps topics frequently and rambles off many topics. No outbursts or agitation noted. Pt. Remains safe on the unit. Q 15 minute checks for safety maintained.

## 2021-01-10 NOTE — BH NOTE
Safety checks completed on unit. All rooms and areas of unit checked. Pt. Has no complaints of safety issues.

## 2021-01-10 NOTE — GROUP NOTE
Group Therapy Note    Date: 1/10/2021    Group Start Time: 1600  Group End Time: 6840  Group Topic: Healthy Living/Wellness    OSMEL Orellana        Group Therapy Note    Attendees: 9/15       Patient's Goal:  Participate in conversation    Notes:  [facility]    Status After Intervention:  Unchanged    Participation Level: Active Listener and Interactive    Participation Quality: Appropriate and Attentive      Speech:  normal      Thought Process/Content: Logical      Affective Functioning: Congruent      Mood: stable      Level of consciousness:  Alert and Attentive      Response to Learning: Able to verbalize current knowledge/experience and Progressing to goal      Endings: None Reported    Modes of Intervention: Education and Support      Discipline Responsible: Behavorial Health Tech      Signature:   Tri Orellana

## 2021-01-10 NOTE — PLAN OF CARE
Problem: Altered Mood, Deterioration in Function:  Goal: Able to verbalize reality based thinking  Description: Able to verbalize reality based thinking  1/9/2021 2032 by Darlene Bedoya LPN  Outcome: Ongoing  Note: Patient denies suicidal thoughts and hallucinations. He reports depression and anxiety are \"ok\". He is delusional in his thoughts at times, he stated \"I could be making $360 watching someone right now but they won't let me leave\" and he also believes the doctor gave him 2 medications that he doesn't need. He has been out in the milieu, social and calm. Q15min safety checks continue     Problem: Altered Mood, Psychotic Behavior:  Goal: Able to verbalize decrease in frequency and intensity of hallucinations  Description: Able to verbalize decrease in frequency and intensity of hallucinations  1/9/2021 2032 by Darlene Bedoya LPN  Outcome: Ongoing  Note: Patient denies suicidal thoughts and hallucinations. He reports depression and anxiety are \"ok\". He is delusional in his thoughts at times, he stated \"I could be making $360 watching someone right now but they won't let me leave\" and he also believes the doctor gave him 2 medications that he doesn't need. He has been out in the milieu, social and calm.  Q15min safety checks continue

## 2021-01-10 NOTE — GROUP NOTE
Group Therapy Note    Date: 1/10/2021    Group Start Time: 1330  Group End Time: 1440  Group Topic: Cognitive Skills    [facility]    [place], [place]        Group Therapy Note    Attendees: 7/15         Patient's Goal:  To increase interpersonal interaction. Notes:  Pt attended and participated in group. Status After Intervention:  Improved    Participation Level:  Active Listener and Interactive    Participation Quality: Appropriate, Attentive and Sharing      Speech:  normal      Thought Process/Content: Logical      Affective Functioning: Congruent      Mood: euthymic      Level of consciousness:  Alert and Attentive      Response to Learning: Progressing to goal      Endings: None Reported    Modes of Intervention: Socialization, Activity, Media and Reality-testing      Discipline Responsible: Psychoeducational Specialist      Signature:  Alicia Alvarado

## 2021-01-11 PROCEDURE — 99232 SBSQ HOSP IP/OBS MODERATE 35: CPT | Performed by: PSYCHIATRY & NEUROLOGY

## 2021-01-11 PROCEDURE — 6370000000 HC RX 637 (ALT 250 FOR IP): Performed by: PSYCHIATRY & NEUROLOGY

## 2021-01-11 PROCEDURE — 1240000000 HC EMOTIONAL WELLNESS R&B

## 2021-01-11 RX ADMIN — DICLOFENAC SODIUM 2 G: 10 GEL TOPICAL at 12:19

## 2021-01-11 RX ADMIN — DICLOFENAC SODIUM 2 G: 10 GEL TOPICAL at 16:48

## 2021-01-11 RX ADMIN — LAMOTRIGINE 200 MG: 100 TABLET ORAL at 08:13

## 2021-01-11 RX ADMIN — ARIPIPRAZOLE 30 MG: 30 TABLET ORAL at 08:13

## 2021-01-11 RX ADMIN — ACETAMINOPHEN 650 MG: 325 TABLET, FILM COATED ORAL at 12:18

## 2021-01-11 RX ADMIN — ACETAMINOPHEN 650 MG: 325 TABLET, FILM COATED ORAL at 06:05

## 2021-01-11 RX ADMIN — LISINOPRIL 20 MG: 20 TABLET ORAL at 08:15

## 2021-01-11 RX ADMIN — DICLOFENAC SODIUM 2 G: 10 GEL TOPICAL at 08:16

## 2021-01-11 RX ADMIN — ACETAMINOPHEN 650 MG: 325 TABLET, FILM COATED ORAL at 20:07

## 2021-01-11 RX ADMIN — HYDROXYZINE HYDROCHLORIDE 50 MG: 50 TABLET, FILM COATED ORAL at 12:24

## 2021-01-11 ASSESSMENT — PAIN SCALES - GENERAL
PAINLEVEL_OUTOF10: 3
PAINLEVEL_OUTOF10: 6
PAINLEVEL_OUTOF10: 4

## 2021-01-11 ASSESSMENT — PAIN DESCRIPTION - LOCATION: LOCATION: GENERALIZED

## 2021-01-11 NOTE — BH NOTE
Pt was agitated by evidence of hyperverbal, feeling anxious, pacing, wringing hands. Staff attempted 1 on 1 talk time and quiet time. PRN given.

## 2021-01-11 NOTE — GROUP NOTE
Group Therapy Note    Date: 1/11/2021    Group Start Time: 1330  Group End Time: 0067  Group Topic: Recreational    [facility] [place], [place]    Attendees: 9         Patient's Goal:  To demonstrate increased interpersonal skills. Notes:  Patient attended group and actively participated in task at hand. Patient conversed appropriately with peers and Vincent Arriola. Status After Intervention:  Improved    Participation Level:  Active Listener and Interactive    Participation Quality: Appropriate, Attentive and Sharing      Speech:  normal      Thought Process/Content: Flight of ideas      Affective Functioning: Congruent      Mood: anxious      Level of consciousness:  Alert, Oriented x4 and Attentive      Response to Learning: Able to verbalize current knowledge/experience, Able to verbalize/acknowledge new learning, Able to retain information, Capable of insight and Progressing to goal      Endings: None Reported       Modes of Intervention: Socialization, Exploration, Clarifying, Problem-solving, Activity, Limit-setting and Reality-testing      Discipline Responsible: Psychoeducational Specialist      Signature:  Marcio Quinonez

## 2021-01-11 NOTE — TELEPHONE ENCOUNTER
Roly Baker would be appropriate and would likely be able to get him seen sooner along with the wrap around services they offer.    Electronically signed by Radha Najera MD on 1/11/2021 at 1:17 PM

## 2021-01-11 NOTE — TELEPHONE ENCOUNTER
This writer left detailed message with provider's response with Joni Medina at [facility] [phone]

## 2021-01-11 NOTE — GROUP NOTE
Group Therapy Note    Date: 1/11/2021    Group Start Time: 0900  Group End Time: 0915  Group Topic: Community Meeting    [facility] [place], [address]    Patient refused to attend Goal Setting / Community Meeting Group at 0900 after encouragement from staff. 1:1 talk time offered.     Signature:  Freida Moscoso

## 2021-01-11 NOTE — GROUP NOTE
Group Therapy Note    Date: 1/11/2021    Group Start Time: 1100  Group End Time: 4778  Group Topic: Psychoeducation    [facility]    [place], [address]        Group Therapy Note    Attendees: 8/15         Patient's Goal:  To increase interpersonal interaction. Notes:  Pt attended and participated in group. Status After Intervention:  Improved    Participation Level:  Active Listener and Interactive    Participation Quality: Appropriate, Attentive and Sharing      Speech:  normal      Thought Process/Content: Logical      Affective Functioning: Congruent      Mood: euthymic      Level of consciousness:  Alert and Attentive      Response to Learning: Progressing to goal      Endings: None Reported    Modes of Intervention: Socialization, Problem-solving, Activity, Media and Reality-testing      Discipline Responsible: Psychoeducational Specialist      Signature:  Maine Ortiz

## 2021-01-11 NOTE — GROUP NOTE
Group Therapy Note    Date: 1/11/2021    Group Start Time: 1430  Group End Time: 1331  Group Topic: Cognitive Skills    [facility]    [place], [place]        Group Therapy Note    Attendees: 9/15      Pt did not attend RT skills group d/t resting in room despite staff invitation to attend. 1:1 talk time offered as alternative to group session, pt declined.

## 2021-01-11 NOTE — GROUP NOTE
Group Therapy Note    Date: 1/11/2021    Group Start Time: 1600  Group End Time: 1640  Group Topic: Healthy Living/Wellness    OSMEL Mcmillan LPN        Group Therapy Note    Attendees: 9/14             Status After Intervention:  Unchanged    Participation Level: Monopolizing    Participation Quality: Intrusive      Speech:  pressured      Thought Process/Content: Linear      Affective Functioning: Congruent      Mood: euthymic      Level of consciousness:  Oriented x4      Response to Learning: Resistant      Endings: None Reported    Modes of Intervention: Education      Discipline Responsible: Licensed Practical Nurse      Signature:  Jacky Mcmillan LPN

## 2021-01-11 NOTE — PLAN OF CARE
Problem: Altered Mood, Deterioration in Function:  Goal: Able to verbalize reality based thinking  Description: Able to verbalize reality based thinking  1/11/2021 0519 by Elisa Botello RN  Note: PT continues to states he is suing us because he is not getting the right medications. PT states he is angry because he is not sleeping. PT has flight of ideas and remains grandiose. PT aloof to self on unit. PT maintained on 15 min safety checks and rounds at irregular intervals. Pt only slept about 2 hours during the night. Problem: Altered Mood, Psychotic Behavior:  Goal: Able to verbalize decrease in frequency and intensity of hallucinations  Description: Able to verbalize decrease in frequency and intensity of hallucinations  1/11/2021 0519 by Elisa Botello RN  Note: Pt denies any hallucinations but is heard with  self talk and inappropriate laughter.

## 2021-01-11 NOTE — PLAN OF CARE
Problem: Altered Mood, Deterioration in Function:  Goal: Able to verbalize reality based thinking  Description: Able to verbalize reality based thinking  1/11/2021 1140 by Stephanie Townsend LPN  Outcome: Ongoing   Patient is grandiose and intrusive, focused on meds and keeping a tally of perceived slights. patient denies thoughts of self harm and remains free from self harm.  Support and encouragement provided

## 2021-01-12 PROCEDURE — 99232 SBSQ HOSP IP/OBS MODERATE 35: CPT | Performed by: PSYCHIATRY & NEUROLOGY

## 2021-01-12 PROCEDURE — 6370000000 HC RX 637 (ALT 250 FOR IP): Performed by: PSYCHIATRY & NEUROLOGY

## 2021-01-12 PROCEDURE — 6370000000 HC RX 637 (ALT 250 FOR IP): Performed by: NURSE PRACTITIONER

## 2021-01-12 PROCEDURE — 1240000000 HC EMOTIONAL WELLNESS R&B

## 2021-01-12 RX ORDER — ARIPIPRAZOLE 30 MG/1
30 TABLET ORAL DAILY
Qty: 30 TABLET | Refills: 3 | Status: SHIPPED | OUTPATIENT
Start: 2021-01-13

## 2021-01-12 RX ADMIN — DICLOFENAC SODIUM 2 G: 10 GEL TOPICAL at 00:25

## 2021-01-12 RX ADMIN — ACETAMINOPHEN 650 MG: 325 TABLET, FILM COATED ORAL at 10:55

## 2021-01-12 RX ADMIN — ARIPIPRAZOLE 30 MG: 30 TABLET ORAL at 07:54

## 2021-01-12 RX ADMIN — LAMOTRIGINE 200 MG: 100 TABLET ORAL at 07:53

## 2021-01-12 RX ADMIN — DICLOFENAC SODIUM 2 G: 10 GEL TOPICAL at 21:44

## 2021-01-12 RX ADMIN — LISINOPRIL 20 MG: 20 TABLET ORAL at 07:54

## 2021-01-12 RX ADMIN — IBUPROFEN 600 MG: 600 TABLET, FILM COATED ORAL at 18:16

## 2021-01-12 ASSESSMENT — PAIN DESCRIPTION - LOCATION: LOCATION: KNEE

## 2021-01-12 ASSESSMENT — PAIN SCALES - GENERAL
PAINLEVEL_OUTOF10: 10
PAINLEVEL_OUTOF10: 1
PAINLEVEL_OUTOF10: 10

## 2021-01-12 NOTE — PROGRESS NOTES
BEHAVIORAL HEALTH FOLLOW-UP NOTE     2021     Patient was seen and examined in person, Chart reviewed   Patient's case discussed with staff/team    Chief Complaint: Acute psychosis    Interim History:   The patient had a shower this morning. He had good grooming and good hygiene. He continues to be hyperverbal.  He also remains euphoric. He did not show any signs of irritability today. He has been compliant with medications and denies any side effects. He denies any suicidal or homicidal ideation        Appetite:   [x] Normal/Unchanged  [] Increased  [] Decreased      Sleep:       [x] Normal/Unchanged  [] Fair       [] Poor              Energy:    [x] Normal/Unchanged  [] Increased  [] Decreased        Aggression:  [x] yes  [] no    Patient is [x] able  [] unable to CONTRACT FOR SAFETY ON THE UNIT    PAST MEDICAL/PSYCHIATRIC HISTORY:   Past Medical History:   Diagnosis Date    Arthritis     Bilateral posterior subcapsular polar cataract     [facility]     Brief psychotic disorder ([facility]     Chronic pain of right knee     Hypertension     Tinnitus of both ears        FAMILY/SOCIAL HISTORY:  No family history on file.   Social History     Socioeconomic History    Marital status: Single     Spouse name: Not on file    Number of children: Not on file    Years of education: Not on file    Highest education level: Not on file   Occupational History    Not on file   Social Needs    Financial resource strain: Not on file    Food insecurity     Worry: Not on file     Inability: Not on file    Transportation needs     Medical: Not on file     Non-medical: Not on file   Tobacco Use    Smoking status: Former Smoker     Types: Cigars     Quit date: 2020     Years since quittin.9    Smokeless tobacco: Never Used   Substance and Sexual Activity    Alcohol use: No    Drug use: No    Sexual activity: Never   Lifestyle    Physical activity     Days per week: Not on file     Minutes Daily PRN, Madyson Gonzalez MD    lisinopril (PRINIVIL;ZESTRIL) tablet 20 mg, 20 mg, Oral, Daily, Mirlande Segovia MD, 20 mg at 01/11/21 0815    haloperidol lactate (HALDOL) injection 5 mg, 5 mg, Intramuscular, Q4H PRN **OR** haloperidol (HALDOL) tablet 5 mg, 5 mg, Oral, Q4H PRN, Mirlande Segovia MD, 5 mg at 12/31/20 1159    LORazepam (ATIVAN) tablet 1 mg, 1 mg, Oral, Q4H PRN, 1 mg at 12/31/20 1159 **OR** LORazepam (ATIVAN) injection 1 mg, 1 mg, Intramuscular, Q4H PRN, Mirlande Segovia MD      Examination:  /76   Pulse 73   Temp 98.1 °F (36.7 °C) (Oral)   Resp 14   SpO2 96%   Gait - steady  Medication side effects(SE): none. Mental Status Examination:    Level of consciousness:  within normal limits   Appearance: Showered in AM. Good grooming and good hygien  Behavior/Motor: No abnormalities noted   Attitude toward examiner: Pleasant and cooperative  Speech:  Overinclusive, pressured  Mood: Euphoric  Affect: Mood congruent  Thought processes:  rapid, loose associations  Thought content:  Homicidal ideation - none  Suicidal Ideation:  denies suicidal ideation  Delusions:  grandios  Perceptual Disturbance:  denies any perceptual disturbance  Cognition:  oriented to person, place, and time   Concentration distractible  Insight fair  Judgement poor     ASSESSMENT:   Patient symptoms are:  [] Well controlled  [x] Improving  [] Worsening  [] No change      Diagnosis:   Active Problems:    [facility]  Resolved Problems:    * No resolved hospital problems. *      LABS:    No results for input(s): WBC, HGB, PLT in the last 72 hours. No results for input(s): NA, K, CL, CO2, BUN, CREATININE, GLUCOSE in the last 72 hours. No results for input(s): BILITOT, ALKPHOS, AST, ALT in the last 72 hours.   No results found for: LABAMPH, BARBSCNU, LABBENZ, CANNAB, COCAINESCRN, LABMETH, OPIATESCREENURINE, PHENCYCLIDINESCREENURINE, PPXUR, ETOH  Lab Results   Component Value Date    TSH 1.06 01/01/2021     No results found for: LITHIUM  No results found for: VALPROATE, CBMZ    RISK ASSESSMENT: Low risk of harm to self, Low risk of harm to others. Low to moderate risk of aggression. Treatment Plan:  Reviewed current Medications with the patient. No changes  monitor for need and use of as needed medications    Risks, benefits, side effects, drug-to-drug interactions and alternatives to treatment were discussed. The patient has consented to treatment. Encourage patient to attend group and other milieu activities. Discharge planning discussed with the patient and treatment team.  Follow-up daily while inpatient      PSYCHOTHERAPY/COUNSELING:  [] Therapeutic interview  [x] Supportive  [] CBT  [] Ongoing  [] Other    [x] Patient continues to need, on a daily basis, active treatment furnished directly by or requiring the supervision of inpatient psychiatric personnel      Anticipated Length of stay: 1-2 days                                       --Marcelo Caicedo MD on 1/11/2021 at 7:14 PM    An electronic signature was used to authenticate this note.

## 2021-01-12 NOTE — GROUP NOTE
Group Therapy Note    Date: 1/12/2021    Group Start Time: 1000  Group End Time: 9514  Group Topic: Music Therapy    [facility] GALICIA    Marcia Pabon        Group Therapy Note    Attendees: 5/16         Patient's Goal:  Patient shared song a song from a song book provided by this writer. Patients then analyzed the lyrics of the song,and turned them into a positive affirmation statement. Goals to provide education about the DBT skill use of positive affirmations, increase sense of community, and increase self-esteem. Notes:  Patient attended and participated in group, choosing a song and engaging in conversations throughout. Patient made confusing comments throughout that were difficult to track, as well as put his mask on the top of his head and began waving his arms back and forth, and dancing in his chair. Status After Intervention:  Improved    Participation Level:  Active Listener and Interactive    Participation Quality: Appropriate, Attentive and Sharing      Speech:  normal      Thought Process/Content: Flight of ideas      Affective Functioning: Congruent      Mood: euthymic      Level of consciousness:  Alert, Attentive and Preoccupied at times      Response to Learning: Able to verbalize current knowledge/experience and Progressing to goal      Endings: None Reported    Modes of Intervention: Education, Support, Socialization, Exploration, Activity, Media and Reality-testing      Discipline Responsible: Psychoeducational Specialist      Signature:  Marcia Pabon

## 2021-01-12 NOTE — PLAN OF CARE
Problem: Altered Mood, Deterioration in Function:  Goal: Able to verbalize reality based thinking  Description: Able to verbalize reality based thinking  Note: PT is politically preoccupied this evening talking about extreme right sided and left sided people while he paces the halls. PT states to peers that he is in the middle. PT again states he is suing us because he should have Ambien. PT has very poor sleep only rests for very short intervals but states he sleeps great. PT maintained on 15 min safety checks and rounds at irregular intervals. Problem: Altered Mood, Psychotic Behavior:  Goal: Able to verbalize decrease in frequency and intensity of hallucinations  Description: Able to verbalize decrease in frequency and intensity of hallucinations  Note: PT denies any hallucinations but is heard with self talk and inappropriate laughter.

## 2021-01-12 NOTE — GROUP NOTE
Group Therapy Note    Date: 1/12/2021    Group Start Time: 1100  Group End Time: 4384  Group Topic: Psychotherapy    [facility] VALENC[facility]    SUMEET Lombardo, KRISTY        Group Therapy Note    Attendees: 3         Patient's Goal:  Pt will demonstrate increased interpersonal interaction.     Notes:  Pt could not focus during group and was tangential.     Status After Intervention:  Unchanged    Participation Level: Interactive    Participation Quality: Inappropriate      Speech:  normal      Thought Process/Content: Flight of ideas      Affective Functioning: Congruent      Mood: anxious      Level of consciousness:  Alert      Response to Learning: Progressing to goal      Endings: None Reported    Modes of Intervention: Support      Discipline Responsible: /Counselor      Signature:  SUMEET Lombardo, KRISTY

## 2021-01-12 NOTE — GROUP NOTE
Group Therapy Note    Date: 1/12/2021    Group Start Time: 1330  Group End Time: 0850  Group Topic: Recreational    [facility] [place], [address]    Attendees: 7         Patient's Goal:  To demonstrate increased interpersonal skills. Notes:  Patient was in and out of group. Patient conversed appropriately with peers and Anthony Sheridan.     Status After Intervention:  Unchanged    Participation Level: Minimal    Participation Quality: Appropriate and Sharing      Speech:  normal      Thought Process/Content: Logical      Affective Functioning: Flat      Mood: dysphoric      Level of consciousness:  Alert and Oriented x4      Response to Learning: Able to verbalize current knowledge/experience, Able to verbalize/acknowledge new learning, Able to retain information, Capable of insight and Progressing to goal      Endings: None Reported       Modes of Intervention: Socialization, Exploration, Clarifying, Problem-solving, Activity, Limit-setting and Reality-testing      Discipline Responsible: Psychoeducational Specialist      Signature:  Ariel Dejesus

## 2021-01-12 NOTE — SUICIDE SAFETY PLAN
SAFETY PLAN    A suicide Safety Plan is a document that supports someone when they are having thoughts of suicide. Warning Signs that indicate a suicidal crisis may be developing: What (situations, thoughts, feelings, body sensations, behaviors, etc.) do you experience that lets you know you are beginning to think about suicide? 1. Go off medications  2. Mood is depressed and start to feel sad, hopeless, helpless, guilty, decline in self-esteem, excess worry, no interest in doing any pleasurable activities, unable to concentrate  3. Begin to cry over the smallest of things  4. Not eating or sleeping as normal  5. Relationship issues start happening  6. I become angry and start a fight  7. When I dont listen or respond to people in a good, positive way  8. Increase drug use      Internal Coping Strategies:  What things can I do (relaxation techniques, hobbies, physical activities, etc.) to take my mind off my problems without contacting another person? 1. Go to hospital discharge appointments and follow-up with community mental health counseling  2. Talk with other people  3. Learn to identify and control your emotions by new ways  4. Think before you speak or act; walk away from the situation  5. Join a support group in person or on Social Media  6. Take a time-out  7. Take deep breaths; use relaxation techniques  8. Get some exercise; go for a walk  9. Read; listen to music; watch a funny movie    10. Coping skills/ strategies - journal/ listen to music/ go for a walk/ read a book/ watch a funny movie/show / crafts / video game   11.  Grounding techniques- eat a sour candy or hot cinnamon candy / focus on colors, sounds, smells, textures on things around you / drink some herbal tea / eat a piece of dark chocolate / take a hot bath or shower / essential oils for smelling / meditate / color / arts and crafts    People whom I can ask for help: Who can I call when I need help - for example, friends, family, clergy, someone else? 1. Friends and family member    Professionals or [facility] agencies I can contact during a crisis: Who can I call for help - for example, my doctor, my psychiatrist, my psychologist, a mental health provider, a suicide hotline? 1. [facility] 24/7 crisis line 5-296.889.5624  2. Suicide Prevention Lifeline: 0-902-814-TALK (8154)      [address] [place] [address] Emergency Services - for example, [address] suicide hotline,   1. [address], 8-845-676-508.554.9373  2. National Association of Mental Illness, 6-717-406-654.773.7645  3. [facility] Substance Abuse National Helpline, 6-775-078-HELP (2012)  4. Crisis Text Line, Text 4HOPE to 610393 to connect with a crisis counselor  5. [address] [place] [address], 4-872.904.8578  6. [facility] (Rape, [address]), 0-493.707.2205  7. [employer] (Alcohol / Drug help)  8. Call the Recovery Helpline at 11 802 649 (24 hours a day - 7 days a week)  9. COVID-19 Emotional Support Line: 353.889.9562    Making the environment safe: How can I make my environment (house/apartment/living space) safer? For example, can I remove guns, medications, and other items? 1. Remove unsafe objects  2. Keep Medications in safe and secure location  3.  Plan daily goals to help remember to stay on specific medications

## 2021-01-12 NOTE — PROGRESS NOTES
BEHAVIORAL HEALTH FOLLOW-UP NOTE     2021     Patient was seen and examined in person, Chart reviewed   Patient's case discussed with staff/team    Chief Complaint: Acute psychosis    Interim History:     The patient is discharge focused. However he is quite animated with pressured speech and flight of ideas. There is also some grandiose content. He believes that the [address] will have consequences if he is not released. The patient is irritable. Appetite:   [x] Normal/Unchanged  [] Increased  [] Decreased      Sleep:       [x] Normal/Unchanged  [] Fair       [] Poor              Energy:    [x] Normal/Unchanged  [] Increased  [] Decreased        Aggression:  [x] yes  [] no    Patient is [x] able  [] unable to CONTRACT FOR SAFETY ON THE UNIT    PAST MEDICAL/PSYCHIATRIC HISTORY:   Past Medical History:   Diagnosis Date    Arthritis     Bilateral posterior subcapsular polar cataract     [facility]     [facility] psychotic disorder ([facility]     Chronic pain of right knee     Hypertension     Tinnitus of both ears        FAMILY/SOCIAL HISTORY:  No family history on file.   Social History     Socioeconomic History    Marital status: Single     Spouse name: Not on file    Number of children: Not on file    Years of education: Not on file    Highest education level: Not on file   Occupational History    Not on file   Social Needs    Financial resource strain: Not on file    Food insecurity     Worry: Not on file     Inability: Not on file    Transportation needs     Medical: Not on file     Non-medical: Not on file   Tobacco Use    Smoking status: Former Smoker     Types: Cigars     Quit date: 2020     Years since quittin.9    Smokeless tobacco: Never Used   Substance and Sexual Activity    Alcohol use: No    Drug use: No    Sexual activity: Never   Lifestyle    Physical activity     Days per week: Not on file     Minutes per session: Not on file    Stress: Not on file   Relationships    Social connections     Talks on phone: Not on file     Gets together: Not on file     Attends [employer] service: Not on file     Active member of club or organization: Not on file     Attends meetings of clubs or organizations: Not on file     Relationship status: Not on file    Intimate partner violence     Fear of current or ex partner: Not on file     Emotionally abused: Not on file     Physically abused: Not on file     Forced sexual activity: Not on file   Other Topics Concern    Not on file   Social History Narrative    Not on file           ROS:  [x] All negative/unchanged except if checked.  Explain positive(checked items) below:  [] Constitutional  [] Eyes  [] Ear/Nose/Mouth/Throat  [] Respiratory  [] CV  [] GI  []   [x] Musculoskeletal  [] Skin/Breast  [] Neurological  [] Endocrine  [] Heme/Lymph  [] Allergic/Immunologic    Explanation: Joint ache    MEDICATIONS:    Current Facility-Administered Medications:     ARIPiprazole (ABILIFY) tablet 30 mg, 30 mg, Oral, Daily, Simi Quintanilla MD, 30 mg at 21 0754    diclofenac sodium (VOLTAREN) 1 % gel 2 g, 2 g, Topical, 4x Daily, BETTY Willett - CNP, 2 g at 21 0025    ibuprofen (ADVIL;MOTRIN) tablet 600 mg, 600 mg, Oral, Q6H PRN, Rocklin Apgar, APRN - CNP, 600 mg at 21 2157    lamoTRIgine (LAMICTAL) tablet 200 mg, 200 mg, Oral, Daily, Simi Quintanilla MD, 200 mg at 21 0753    acetaminophen (TYLENOL) tablet 650 mg, 650 mg, Oral, Q4H PRN, Bacilio Bah MD, 650 mg at 21 1055    aluminum & magnesium hydroxide-simethicone (MAALOX) 200-200-20 MG/5ML suspension 30 mL, 30 mL, Oral, Q6H PRN, Bacilio Bah MD    hydrOXYzine (ATARAX) tablet 50 mg, 50 mg, Oral, TID PRN, Bacilio Bah MD, 50 mg at 21 1224    nicotine polacrilex (NICORETTE) gum 2 mg, 2 mg, Oral, PRN, Bacilio Bah MD    polyethylene glycol (GLYCOLAX) packet 17 g, 17 g, Oral, Daily PRN, Bacilio Bah MD    lisinopril (PRINIVIL;ZESTRIL) tablet 20 mg, 20 mg, Oral, Daily, Marta Mendenhall MD, 20 mg at 01/12/21 0754    haloperidol lactate (HALDOL) injection 5 mg, 5 mg, Intramuscular, Q4H PRN **OR** haloperidol (HALDOL) tablet 5 mg, 5 mg, Oral, Q4H PRN, Marta Mendenhall MD, 5 mg at 12/31/20 1159    LORazepam (ATIVAN) tablet 1 mg, 1 mg, Oral, Q4H PRN, 1 mg at 12/31/20 1159 **OR** LORazepam (ATIVAN) injection 1 mg, 1 mg, Intramuscular, Q4H PRN, Marta Mendenhall MD      Examination:  /65   Pulse 102   Temp 97.3 °F (36.3 °C) (Oral)   Resp 14   SpO2 96%   Gait - steady  Medication side effects(SE): none. Mental Status Examination:    Level of consciousness:  within normal limits   Appearance: Well-kempt with good grooming and good hygiene   behavior/Motor: No abnormalities noted   Attitude toward examiner: Pleasant and cooperative  Speech: Hyperverbal and pressured   mood: Euphoric  Affect: Mood congruent  Thought processes:  rapid, loose associations  Thought content:  Homicidal ideation - none  Suicidal Ideation:  denies suicidal ideation  Delusions:  grandios  Perceptual Disturbance:  denies any perceptual disturbance  Cognition:  oriented to person, place, and time   Concentration distractible  Insight fair  Judgement poor     ASSESSMENT:   Patient symptoms are:  [] Well controlled  [x] Improving  [] Worsening  [] No change      Diagnosis:   Active Problems:    Kalie ([facility] [address]  Resolved Problems:    * No resolved hospital problems. *      LABS:    No results for input(s): WBC, HGB, PLT in the last 72 hours. No results for input(s): NA, K, CL, CO2, BUN, CREATININE, GLUCOSE in the last 72 hours. No results for input(s): BILITOT, ALKPHOS, AST, ALT in the last 72 hours.   No results found for: LABAMPH, BARBSCNU, LABBENZ, CANNAB, COCAINESCRN, LABMETH, OPIATESCREENURINE, PHENCYCLIDINESCREENURINE, PPXUR, ETOH  Lab Results   Component Value Date    TSH 1.06 01/01/2021     No results found for: LITHIUM  No results found for: VALPROATE, CBMZ    RISK ASSESSMENT: Low risk of harm to self, Low risk of harm to others. Low to moderate risk of aggression. Treatment Plan:  Reviewed current Medications with the patient. No changes  monitor for need and use of as needed medications    Risks, benefits, side effects, drug-to-drug interactions and alternatives to treatment were discussed. The patient has consented to treatment. Encourage patient to attend group and other milieu activities. Discharge planning discussed with the patient and treatment team.  Follow-up daily while inpatient      PSYCHOTHERAPY/COUNSELING:  [] Therapeutic interview  [x] Supportive  [] CBT  [] Ongoing  [] Other    [x] Patient continues to need, on a daily basis, active treatment furnished directly by or requiring the supervision of inpatient psychiatric personnel      Anticipated Length of stay: 1-2 days                                       --Dada Buchanan MD on 1/12/2021 at 12:50 PM    An electronic signature was used to authenticate this note.

## 2021-01-12 NOTE — GROUP NOTE
Group Therapy Note    Date: 1/12/2021    Group Start Time: 0900  Group End Time: 0920  Group Topic: Community Meeting    OSMEL Frazier, [address]    Attendees: 8         Patient's Goal:  To verbalize obtainable an short term goal pertaining to mental health and stability that can be achieved today. To be informed of programming schedule and reminded on unit rules / guidelines. 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 Status After Intervention:  Improved    Participation Level:  Active Listener and Interactive    Participation Quality: Appropriate, Attentive and Sharing      Speech:  normal      Thought Process/Content: Flight of ideas      Affective Functioning: Congruent      Mood: euthymic      Level of consciousness:  Alert, Oriented x4 and Attentive      Response to Learning: Able to verbalize current knowledge/experience, Able to verbalize/acknowledge new learning, Able to retain information, Capable of insight and Progressing to goal      Endings: None Reported       Modes of Intervention: Support, Socialization, Exploration, Clarifying, Problem-solving, Confrontation, Limit-setting and Reality-testing      Discipline Responsible: Psychoeducational Specialist      Signature:  Steve Lilly

## 2021-01-12 NOTE — PLAN OF CARE
Problem: Altered Mood, Psychotic Behavior:  Goal: Able to verbalize decrease in frequency and intensity of hallucinations  Description: Able to verbalize decrease in frequency and intensity of hallucinations  1/12/2021 0920 by Sarika Lawton RN  Outcome: Completed   1:1 with pt x ten minutes. Pt encouraged to attend unit programming and interact with peers and staff. Pt also encouraged to tend to hygiene and ADLs. Pt encouraged to discuss feelings with staff and feedback and reassurance provided. Pt denies thoughts of self harm and is agreeable to seeking out should thoughts of self harm arise. Safe environment maintained. Q15 minute checks for safety cont per unit policy. Will cont to monitor for safety and provides support and reassurance as needed. Pt is controlled in behaviors. Manic behaviors noted. Pt states he is not ready for discharge.

## 2021-01-12 NOTE — GROUP NOTE
Group Therapy Note    Date: 1/12/2021    Group Start Time: 1600  Group End Time: 7608  Group Topic: Group Documentation    OSMEL GALICIA    Umer Cabral        Group Therapy Note    Attendees: 10/14         Patient's Goal:  Attend and participate in wellness group    Notes: Distress Tolerance Skills: ACCEPTS  Status After Intervention:  Improved    Participation Level:  Active Listener and Interactive    Participation Quality: Appropriate, Attentive, Sharing and Supportive      Speech:  normal      Thought Process/Content: Logical      Affective Functioning: Congruent      Mood: anxious      Level of consciousness:  Alert, Oriented x4 and Attentive      Response to Learning: Able to verbalize current knowledge/experience, Able to verbalize/acknowledge new learning, Able to retain information and Capable of insight      Endings: None Reported    Modes of Intervention: Education, Support, Socialization, Exploration and Problem-solving      Discipline Responsible: [facility], [address], [employer]      Signature:  Umer Cabral

## 2021-01-12 NOTE — FLOWSHEET NOTE
*Patient participated in the Spirituality Group     01/12/21 1511   Encounter Summary   Services provided to: Patient   Referral/Consult From: Rounding   Complexity of Encounter Moderate   Length of Encounter 30 minutes   Spiritual Assessment Completed Yes   Spiritual/Scientology   Type Spiritual support   Assessment Calm; Approachable   Intervention Active listening   Outcome Receptive;Engaged in conversation

## 2021-01-13 PROCEDURE — 1240000000 HC EMOTIONAL WELLNESS R&B

## 2021-01-13 PROCEDURE — 6370000000 HC RX 637 (ALT 250 FOR IP): Performed by: PSYCHIATRY & NEUROLOGY

## 2021-01-13 PROCEDURE — 99232 SBSQ HOSP IP/OBS MODERATE 35: CPT | Performed by: PSYCHIATRY & NEUROLOGY

## 2021-01-13 RX ADMIN — ARIPIPRAZOLE 30 MG: 30 TABLET ORAL at 08:44

## 2021-01-13 RX ADMIN — LISINOPRIL 20 MG: 20 TABLET ORAL at 08:44

## 2021-01-13 RX ADMIN — ACETAMINOPHEN 650 MG: 325 TABLET, FILM COATED ORAL at 22:38

## 2021-01-13 RX ADMIN — DICLOFENAC SODIUM 2 G: 10 GEL TOPICAL at 18:11

## 2021-01-13 RX ADMIN — DICLOFENAC SODIUM 2 G: 10 GEL TOPICAL at 14:07

## 2021-01-13 RX ADMIN — LAMOTRIGINE 200 MG: 100 TABLET ORAL at 08:44

## 2021-01-13 RX ADMIN — DICLOFENAC SODIUM 2 G: 10 GEL TOPICAL at 22:38

## 2021-01-13 RX ADMIN — DICLOFENAC SODIUM 2 G: 10 GEL TOPICAL at 08:45

## 2021-01-13 RX ADMIN — ACETAMINOPHEN 650 MG: 325 TABLET, FILM COATED ORAL at 12:42

## 2021-01-13 ASSESSMENT — PAIN SCALES - GENERAL
PAINLEVEL_OUTOF10: 1
PAINLEVEL_OUTOF10: 2
PAINLEVEL_OUTOF10: 4

## 2021-01-13 ASSESSMENT — PAIN DESCRIPTION - LOCATION: LOCATION: GENERALIZED

## 2021-01-13 NOTE — GROUP NOTE
Group Therapy Note    Date: 1/13/2021    Group Start Time: 0900  Group End Time: 0915  Group Topic: Community Meeting    OSMEL Gay        Group Therapy Note    Attendees: 8/17       Patient's Goal:  To orient to unit and set a daily goal    Notes:  Patient attended and participated in group. Goal: cope with pain of arthirits. Throughout group patient was rubbing some kind of jelly on his right knee    Status After Intervention:  Improved    Participation Level:  Active Listener and Interactive    Participation Quality: Appropriate, Attentive and Sharing      Speech:  normal      Thought Process/Content: Logical  Linear      Affective Functioning: Congruent      Mood: euthymic      Level of consciousness:  Alert and Attentive      Response to Learning: Able to verbalize current knowledge/experience and Progressing to goal      Endings: None Reported    Modes of Intervention: Education, Support, Socialization, Exploration, Problem-solving and Reality-testing      Discipline Responsible: Psychoeducational Specialist      Signature:  Freddy Gay

## 2021-01-13 NOTE — PLAN OF CARE
Problem: Altered Mood, Depressive Behavior:  Goal: Able to verbalize and/or display a decrease in depressive symptoms  Description: Able to verbalize and/or display a decrease in depressive symptoms  Note: PT denies any hallucinations but is heard with self talk and innapropriate laughter. PT only rested for an hour in a half this evening. PT up pacing in room talking to self when staff rounds pt lays in bed and pretends to be a sleep and fake snores. PT was asked how his day was and pt states [employer] is changed on the first this place is going to charge me 2 million dollars, the only doctor I want is doctor Amber Harrington going to gris him, did you know about the [address] there responsible for more than you know [place] men are going to kill us all\" . Pt then pounded on chest and walked away from desk. PT came back to desk shortly after and apologies for getting loud. This morning pt sounds agitated pacing halls talking about Faith and double bypasses. Problem: Altered Mood, Depressive Behavior:  Goal: Ability to disclose and discuss suicidal ideas will improve  Description: Ability to disclose and discuss suicidal ideas will improve  Note: Pt denies thoughts of self harm and is agreeable to seeking out should thoughts of self harm arise. Safe environment maintained. Q15 minute checks for safety cont per unit policy. Will cont to monitor for safety and provides support and reassurance as needed.

## 2021-01-13 NOTE — GROUP NOTE
Group Therapy Note    Date: 1/13/2021    Group Start Time: 1430  Group End Time: 1520  Group Topic: Music Therapy    OSMEL GALICIA    Luis Waddell        Group Therapy Note    Attendees: 9/15       Patient's Goal:  Patients shared preferred songs, and connected them to mental health related quotes, as provided on a list by this writer. Goals to increase, self-expression, sense of community, motivation, and provide cognitive stimulation    Notes:  Patient attended and participated in group, engaging positively with peers and staff, and sharing a song and quote. During group, was occasionally waving arms back and forth in the air, in time to music. Status After Intervention:  Improved    Participation Level:  Active Listener and Interactive    Participation Quality: Appropriate, Attentive, Sharing and Supportive      Speech:  normal      Thought Process/Content: Logical  Linear      Affective Functioning: Congruent      Mood: euthymic      Level of consciousness:  Alert and Attentive      Response to Learning: Able to verbalize current knowledge/experience and Progressing to goal      Endings: None Reported    Modes of Intervention: Support, Socialization, Exploration, Activity, Media and Reality-testing      Discipline Responsible: Psychoeducational Specialist      Signature:  Luis Waddell

## 2021-01-13 NOTE — GROUP NOTE
Group Therapy Note    Date: 1/13/2021    Group Start Time: 1000  Group End Time: 3641  Group Topic: Psychoeducation    [facility]    [place], [place]        Group Therapy Note    Attendees: 8/17           Patient's Goal:  To increase interpersonal interaction. Notes:  PT attended and participated in group. Status After Intervention:  Improved    Participation Level:  Active Listener and Interactive    Participation Quality: Appropriate, Attentive and Sharing      Speech:  normal      Thought Process/Content: Logical      Affective Functioning: Congruent      Mood: euthymic      Level of consciousness:  Alert and Attentive      Response to Learning: Able to verbalize current knowledge/experience and Progressing to goal      Endings: None Reported    Modes of Intervention: Socialization, Problem-solving, Activity, Media and Reality-testing      Discipline Responsible: Psychoeducational Specialist      Signature:  Julio Cesar Jaime

## 2021-01-13 NOTE — PROGRESS NOTES
BEHAVIORAL HEALTH FOLLOW-UP NOTE     1/13/2021     Patient was seen and examined in person, Chart reviewed   Patient's case discussed with staff/team    Chief Complaint: Acute psychosis    Interim History:     The patient continues to refuse to take anything other than Abilify. He insists that he was told by his outpatient psychiatrist that he needs nothing other than Abilify and should not take any other medication. He invokes the [place] and other important people when talking about his complaints. He believes that a call to the governor's office can put me into trouble. The patient continues to have pressured of speech and flight of ideas. He is irritable. He was not agreeable to his change of medication to risperidone. I have offered to add Depakote. He has declined but we will offer it. Appetite:   [x] Normal/Unchanged  [] Increased  [] Decreased      Sleep:       [x] Normal/Unchanged  [] Fair       [] Poor              Energy:    [x] Normal/Unchanged  [] Increased  [] Decreased        Aggression:  [x] yes  [] no    Patient is [x] able  [] unable to CONTRACT FOR SAFETY ON THE UNIT    PAST MEDICAL/PSYCHIATRIC HISTORY:   Past Medical History:   Diagnosis Date    Arthritis     Bilateral posterior subcapsular polar cataract     [facility] ([facility] [address] [facility]     Brief psychotic disorder ([facility] [address] [facility]     Chronic pain of right knee     Hypertension     Tinnitus of both ears        FAMILY/SOCIAL HISTORY:  No family history on file.   Social History     Socioeconomic History    Marital status: Single     Spouse name: Not on file    Number of children: Not on file    Years of education: Not on file    Highest education level: Not on file   Occupational History    Not on file   Social Needs    Financial resource strain: Not on file    Food insecurity     Worry: Not on file     Inability: Not on file    Transportation needs     Medical: Not on file     Non-medical: Not on file   Tobacco Use    Smoking status: Former Smoker     Types: Cigars     Quit date: 2020     Years since quittin.9    Smokeless tobacco: Never Used   Substance and Sexual Activity    Alcohol use: No    Drug use: No    Sexual activity: Never   Lifestyle    Physical activity     Days per week: Not on file     Minutes per session: Not on file    Stress: Not on file   Relationships    Social connections     Talks on phone: Not on file     Gets together: Not on file     Attends Yazdanism service: Not on file     Active member of club or organization: Not on file     Attends meetings of clubs or organizations: Not on file     Relationship status: Not on file    Intimate partner violence     Fear of current or ex partner: Not on file     Emotionally abused: Not on file     Physically abused: Not on file     Forced sexual activity: Not on file   Other Topics Concern    Not on file   Social History Narrative    Not on file           ROS:  [x] All negative/unchanged except if checked.  Explain positive(checked items) below:  [] Constitutional  [] Eyes  [] Ear/Nose/Mouth/Throat  [] Respiratory  [] CV  [] GI  []   [x] Musculoskeletal  [] Skin/Breast  [] Neurological  [] Endocrine  [] Heme/Lymph  [] Allergic/Immunologic    Explanation: Joint ache    MEDICATIONS:    Current Facility-Administered Medications:     ARIPiprazole (ABILIFY) tablet 30 mg, 30 mg, Oral, Daily, Surya Navarro MD, 30 mg at 21 0844    diclofenac sodium (VOLTAREN) 1 % gel 2 g, 2 g, Topical, 4x Daily, Isreal Mobley APRN - CNP, 2 g at 21 1407    ibuprofen (ADVIL;MOTRIN) tablet 600 mg, 600 mg, Oral, Q6H PRN, Daniela Obregon APRN - CNP, 600 mg at 21 1816    lamoTRIgine (LAMICTAL) tablet 200 mg, 200 mg, Oral, Daily, Surya Navarro MD, 200 mg at 21 0844    acetaminophen (TYLENOL) tablet 650 mg, 650 mg, Oral, Q4H PRN, Karen Jamil MD, 650 mg at 21 1242    aluminum & magnesium hydroxide-simethicone (MAALOX) 526-801-97 MG/5ML suspension 30 mL, 30 mL, Oral, Q6H PRN, Karen Jamil MD    hydrOXYzine (ATARAX) tablet 50 mg, 50 mg, Oral, TID PRN, Karen Jamil MD, 50 mg at 01/11/21 1224    nicotine polacrilex (NICORETTE) gum 2 mg, 2 mg, Oral, PRN, Karen Jamil MD    polyethylene glycol (GLYCOLAX) packet 17 g, 17 g, Oral, Daily PRN, Karen Jamil MD    lisinopril (PRINIVIL;ZESTRIL) tablet 20 mg, 20 mg, Oral, Daily, Surya Navarro MD, 20 mg at 01/13/21 0844    haloperidol lactate (HALDOL) injection 5 mg, 5 mg, Intramuscular, Q4H PRN **OR** haloperidol (HALDOL) tablet 5 mg, 5 mg, Oral, Q4H PRN, Surya Navarro MD, 5 mg at 12/31/20 1159    LORazepam (ATIVAN) tablet 1 mg, 1 mg, Oral, Q4H PRN, 1 mg at 12/31/20 1159 **OR** LORazepam (ATIVAN) injection 1 mg, 1 mg, Intramuscular, Q4H PRN, Surya Navarro MD      Examination:  /77   Pulse 69   Temp 97.1 °F (36.2 °C) (Oral)   Resp 14   SpO2 96%   Gait - steady  Medication side effects(SE): none. Mental Status Examination:    Level of consciousness:  within normal limits   Appearance: Well-kempt with good grooming and good hygiene   behavior/Motor: No abnormalities noted   Attitude toward examiner: hostile and demanding  Speech: Hyperverbal and pressured and loud  mood: Euphoric and irritable  Affect: Mood congruent  Thought processes:  rapid, loose associations  Thought content:  Homicidal ideation - none  Suicidal Ideation:  denies suicidal ideation  Delusions:  grandiose  Perceptual Disturbance:  denies any perceptual disturbance  Cognition:  oriented to person, place, and time   Concentration distractible  Insight poor  Judgement poor     ASSESSMENT:   Patient symptoms are:  [] Well controlled  [x] Improving  [] Worsening  [] No change      Diagnosis:   Active Problems:    Kalie ([facility] [address]  Resolved Problems:    * No resolved hospital problems. *      LABS:    No results for input(s): WBC, HGB, PLT in the last 72 hours.   No results for input(s): NA, K, CL, CO2, BUN, CREATININE, GLUCOSE in the last 72 hours. No results for input(s): BILITOT, ALKPHOS, AST, ALT in the last 72 hours. No results found for: [place], LABBENZ, CANNAB, COCAINESCRN, LABMETH, OPIATESCREENURINE, PHENCYCLIDINESCREENURINE, PPXUR, ETOH  Lab Results   Component Value Date    TSH 1.06 01/01/2021     No results found for: LITHIUM  No results found for: VALPROATE, CBMZ    RISK ASSESSMENT: Low risk of harm to self, Low risk of harm to others. Low to moderate risk of aggression. Treatment Plan:  Reviewed current Medications with the patient. Start Depakote 500mg bid.   monitor for need and use of as needed medications    Risks, benefits, side effects, drug-to-drug interactions and alternatives to treatment were discussed. The patient has consented to treatment. Encourage patient to attend group and other milieu activities. Discharge planning discussed with the patient and treatment team.  Follow-up daily while inpatient      PSYCHOTHERAPY/COUNSELING:  [] Therapeutic interview  [x] Supportive  [] CBT  [] Ongoing  [] Other    [x] Patient continues to need, on a daily basis, active treatment furnished directly by or requiring the supervision of inpatient psychiatric personnel      Anticipated Length of stay: 3-5 days                                       --Etta Danielson MD on 1/13/2021 at 3:53 PM    An electronic signature was used to authenticate this note.

## 2021-01-13 NOTE — PLAN OF CARE
Problem: Altered Mood, Depressive Behavior:  Goal: Able to verbalize and/or display a decrease in depressive symptoms  Description: Able to verbalize and/or display a decrease in depressive symptoms  Patient denies depression symptoms at this time; responding to self; during 1:1 patient stated \"[employer] to me. I am the son of the [employer]. \"    Problem: Altered Mood, Depressive Behavior:  Goal: Ability to disclose and discuss suicidal ideas will improve  Description: Ability to disclose and discuss suicidal ideas will improve  Patient denies suicidal ideas at this time; 15-min safety checks continued; patient agrees to seek out staff if thoughts arise

## 2021-01-13 NOTE — PROGRESS NOTES
Pharmacy Med Education Group Note    Date: 1/13  Start Time: 36  End Time: 1700    Number Participants in Group:  8    Goal:  Patient will demonstrate an understanding of the medications intended purpose and possible adverse effects  Topic: [place] for Pharmacy Med Ed Group    Discipline Responsible:     OT  AT  [facility].  RT     X Other       Participation Level:     None  Minimal      X Active Listener    X Interactive    Monopolizing         Participation Quality:    X Appropriate  Inappropriate     X       Attentive        Intrusive          Sharing        Resistant          Supportive        Lethargic       Affective:     X Congruent  Incongruent  Blunted  Flat    Constricted  Anxious  Elated  Angry    Labile  Depressed  Other         Cognitive:    X Alert  Oriented PPTP     Concentration   X G  F  P   Attention Span   X G  F  P   Short-Term Memory   X G  F  P   Long-Term Memory  G  F  P   ProblemSolving/  Decision Making  G  F  P   Ability to Process  Information   X G  F  P      Contributing Factors             Delusional             Hallucinating             Flight of Ideas             Other:       Modes of Intervention:    X Education   X Support  Exploration    Clarifying  Problem Solving  Confrontation    Socialization  Limit Setting  Reality Testing    Activity  Movement  Media    Other:            Response to Learning:    X Able to verbalize current knowledge/experience    Able to verbalize/acknowledge new learning    Able to retain information    Capable of insight    Able to change behavior    Progressing to goal    Other:        Comments:   Patient was engaged in group, great job! Marcus Painting, PharmD.  [facility]) 1/13/2021 5:12 PM

## 2021-01-14 PROCEDURE — 6370000000 HC RX 637 (ALT 250 FOR IP): Performed by: PSYCHIATRY & NEUROLOGY

## 2021-01-14 PROCEDURE — 6370000000 HC RX 637 (ALT 250 FOR IP): Performed by: NURSE PRACTITIONER

## 2021-01-14 PROCEDURE — 1240000000 HC EMOTIONAL WELLNESS R&B

## 2021-01-14 PROCEDURE — 99232 SBSQ HOSP IP/OBS MODERATE 35: CPT | Performed by: PSYCHIATRY & NEUROLOGY

## 2021-01-14 RX ORDER — DIVALPROEX SODIUM 500 MG/1
500 TABLET, EXTENDED RELEASE ORAL 2 TIMES DAILY
Status: DISCONTINUED | OUTPATIENT
Start: 2021-01-14 | End: 2021-01-18 | Stop reason: HOSPADM

## 2021-01-14 RX ADMIN — DICLOFENAC SODIUM 2 G: 10 GEL TOPICAL at 08:35

## 2021-01-14 RX ADMIN — DICLOFENAC SODIUM 2 G: 10 GEL TOPICAL at 18:02

## 2021-01-14 RX ADMIN — DICLOFENAC SODIUM 2 G: 10 GEL TOPICAL at 14:30

## 2021-01-14 RX ADMIN — DICLOFENAC SODIUM 2 G: 10 GEL TOPICAL at 20:36

## 2021-01-14 RX ADMIN — ACETAMINOPHEN 650 MG: 325 TABLET, FILM COATED ORAL at 14:32

## 2021-01-14 RX ADMIN — ARIPIPRAZOLE 30 MG: 30 TABLET ORAL at 08:25

## 2021-01-14 RX ADMIN — LAMOTRIGINE 200 MG: 100 TABLET ORAL at 08:25

## 2021-01-14 RX ADMIN — LISINOPRIL 20 MG: 20 TABLET ORAL at 08:25

## 2021-01-14 RX ADMIN — IBUPROFEN 600 MG: 600 TABLET, FILM COATED ORAL at 18:05

## 2021-01-14 ASSESSMENT — PAIN SCALES - GENERAL
PAINLEVEL_OUTOF10: 0
PAINLEVEL_OUTOF10: 7

## 2021-01-14 NOTE — BH NOTE
Group Therapy Note     Date: 1/14/2021     Group Start Time: 1600  Group End Time: 1630  Group Topic: Wellness Group      OSMAN Lopez, RN      Patient refused to attend Wellness Group at 1600 after encouragement from staff.  1:1 talk time offered.     Signature: OSMAN Hernandez, RN

## 2021-01-14 NOTE — GROUP NOTE
Group Therapy Note    Date: 1/14/2021    Group Start Time: 1330  Group End Time: 6758  Group Topic: Psychoeducation    EMI Handley    Attendees: 7         Patient's Goal:  To learn more about BELLA services offered in the community through [employer]. To be informed of other local mental health community supports that can be utilized to help maintain stability, build supports and to learn more about mental illness. Notes:  Patient attended group and actively participated in task at hand. Patient conversed appropriately with peers and [employer]. Status After Intervention:  Improved    Participation Level:  Active Listener and Interactive    Participation Quality: Appropriate, Attentive and Sharing      Speech:  normal      Thought Process/Content: Logical      Affective Functioning: Congruent      Mood: euthymic      Level of consciousness:  Alert, Oriented x4 and Attentive      Response to Learning: Able to verbalize current knowledge/experience, Able to verbalize/acknowledge new learning, Able to retain information, Capable of insight and Progressing to goal      Endings: None Reported       Modes of Intervention: Education, Support, Socialization, Exploration, Clarifying, Media and Reality-testing      Discipline Responsible: Psychoeducational Specialist      Signature:  Mala Woodruff

## 2021-01-14 NOTE — PLAN OF CARE
Problem: Altered Mood, Depressive Behavior:  Goal: Able to verbalize and/or display a decrease in depressive symptoms  Description: Able to verbalize and/or display a decrease in depressive symptoms  Note: PT continues to pace mckoy with self talk. PT denies any hallucinations. PT only rested for a half an hour at a time for a total of 2 hours last night. Pt remains religiously and politically focused. PT grandiose at times. When asked how he was doing pt states \"I am waiting on social security to call me back because instead of sending my money to my account I want them to send to the hospital so I don't have a bill\" \"I need an escort to go and get a drivers license so you can legally know who I am\". PT then starts talking about  and representatives and walks away from [facility]. Problem: Altered Mood, Depressive Behavior:  Goal: Ability to disclose and discuss suicidal ideas will improve  Description: Ability to disclose and discuss suicidal ideas will improve  Note: Pt denies thoughts of self harm and is agreeable to seeking out should thoughts of self harm arise. Safe environment maintained. Q15 minute checks for safety cont per unit policy. Will cont to monitor for safety and provides support and reassurance as needed.

## 2021-01-14 NOTE — PROGRESS NOTES
BEHAVIORAL HEALTH FOLLOW-UP NOTE     2021     Patient was seen and examined in person, Chart reviewed   Patient's case discussed with staff/team    Chief Complaint: Acute psychosis    Interim History:   Noted that the patient declined to take his Depakote this morning. He has also not slept well last night. He has been irritable with staff. Yesterday the patient was expressing Yazdanism delusional beliefs of a grandiose nature    The patient was seen at bedside. He continues to be hyperverbal with pressured speech. He was less irritable this morning. The patient    Appetite:   [x] Normal/Unchanged  [] Increased  [] Decreased      Sleep:       [x] Normal/Unchanged  [] Fair       [] Poor              Energy:    [x] Normal/Unchanged  [] Increased  [] Decreased        Aggression:  [x] yes  [] no    Patient is [x] able  [] unable to CONTRACT FOR SAFETY ON THE UNIT    PAST MEDICAL/PSYCHIATRIC HISTORY:   Past Medical History:   Diagnosis Date    Arthritis     Bilateral posterior subcapsular polar cataract     [facility] 1 disorder ([facility] [address]     Brief psychotic disorder ([facility] [address]     Chronic pain of right knee     Hypertension     Tinnitus of both ears        FAMILY/SOCIAL HISTORY:  No family history on file.   Social History     Socioeconomic History    Marital status: Single     Spouse name: Not on file    Number of children: Not on file    Years of education: Not on file    Highest education level: Not on file   Occupational History    Not on file   Social Needs    Financial resource strain: Not on file    Food insecurity     Worry: Not on file     Inability: Not on file    Transportation needs     Medical: Not on file     Non-medical: Not on file   Tobacco Use    Smoking status: Former Smoker     Types: Cigars     Quit date: 2020     Years since quittin.9    Smokeless tobacco: Never Used   Substance and Sexual Activity    Alcohol use: No    Drug use: No    Sexual activity: Never   Lifestyle  Physical activity     Days per week: Not on file     Minutes per session: Not on file    Stress: Not on file   Relationships    Social connections     Talks on phone: Not on file     Gets together: Not on file     Attends Buddhist service: Not on file     Active member of club or organization: Not on file     Attends meetings of clubs or organizations: Not on file     Relationship status: Not on file    Intimate partner violence     Fear of current or ex partner: Not on file     Emotionally abused: Not on file     Physically abused: Not on file     Forced sexual activity: Not on file   Other Topics Concern    Not on file   Social History Narrative    Not on file           ROS:  [x] All negative/unchanged except if checked.  Explain positive(checked items) below:  [] Constitutional  [] Eyes  [] Ear/Nose/Mouth/Throat  [] Respiratory  [] CV  [] GI  []   [x] Musculoskeletal  [] Skin/Breast  [] Neurological  [] Endocrine  [] Heme/Lymph  [] Allergic/Immunologic    Explanation: Joint ache    MEDICATIONS:    Current Facility-Administered Medications:     divalproex (DEPAKOTE ER) extended release tablet 500 mg, 500 mg, Oral, BID, Mirlande Segovia MD    ARIPiprazole (ABILIFY) tablet 30 mg, 30 mg, Oral, Daily, Mirlande Segovia MD, 30 mg at 01/14/21 0825    diclofenac sodium (VOLTAREN) 1 % gel 2 g, 2 g, Topical, 4x Daily, BETTY Carbajal - CNP, 2 g at 01/14/21 1430    ibuprofen (ADVIL;MOTRIN) tablet 600 mg, 600 mg, Oral, Q6H PRN, BETTY Skinner - CNP, 600 mg at 01/12/21 1816    lamoTRIgine (LAMICTAL) tablet 200 mg, 200 mg, Oral, Daily, Mirlande Segovia MD, 200 mg at 01/14/21 0825    acetaminophen (TYLENOL) tablet 650 mg, 650 mg, Oral, Q4H PRN, Madyson Gonzalez MD, 650 mg at 01/14/21 1432    aluminum & magnesium hydroxide-simethicone (MAALOX) 200-200-20 MG/5ML suspension 30 mL, 30 mL, Oral, Q6H PRN, Madyson Gonzalez MD    hydrOXYzine (ATARAX) tablet 50 mg, 50 mg, Oral, TID PRN, Madyson Gonzalez MD, 50 mg at 01/11/21 1224    nicotine polacrilex (NICORETTE) gum 2 mg, 2 mg, Oral, PRN, Karen Jamil MD    polyethylene glycol (GLYCOLAX) packet 17 g, 17 g, Oral, Daily PRN, Karen Jamil MD    lisinopril (PRINIVIL;ZESTRIL) tablet 20 mg, 20 mg, Oral, Daily, Surya Navarro MD, 20 mg at 01/14/21 0825    haloperidol lactate (HALDOL) injection 5 mg, 5 mg, Intramuscular, Q4H PRN **OR** haloperidol (HALDOL) tablet 5 mg, 5 mg, Oral, Q4H PRN, Surya Navarro MD, 5 mg at 12/31/20 1159    LORazepam (ATIVAN) tablet 1 mg, 1 mg, Oral, Q4H PRN, 1 mg at 12/31/20 1159 **OR** LORazepam (ATIVAN) injection 1 mg, 1 mg, Intramuscular, Q4H PRN, Surya Navarro MD      Examination:  /76   Pulse 73   Temp 98.4 °F (36.9 °C) (Oral)   Resp 14   SpO2 96%   Gait - steady  Medication side effects(SE): none. Mental Status Examination:    Level of consciousness:  within normal limits   Appearance: Well-kempt with good grooming and good hygiene   behavior/Motor: No abnormalities noted   Attitude toward examiner: Cooperative  Speech: Hyperverbal and pressured, normal rhythm volume  mood: Irritable  Affect: Mood congruent  Thought processes: Loose associations   thought content:  Homicidal ideation - none  Suicidal Ideation:  denies suicidal ideation  Delusions:  grandiose  Perceptual Disturbance:  denies any perceptual disturbance  Cognition:  oriented to person, place, and time   Concentration distractible  Insight poor  Judgement poor     ASSESSMENT:   Patient symptoms are:  [] Well controlled  [x] Improving  [] Worsening  [] No change      Diagnosis:   Active Problems:    Kalie ([address]  Resolved Problems:    * No resolved hospital problems. *      LABS:    No results for input(s): WBC, HGB, PLT in the last 72 hours. No results for input(s): NA, K, CL, CO2, BUN, CREATININE, GLUCOSE in the last 72 hours. No results for input(s): BILITOT, ALKPHOS, AST, ALT in the last 72 hours.   No results found for: Yudelka Campi, Juan Carlos Childs, LABMETH, . [address], PHENCYCLIDINESCREENURINE, PPXUR, ETOH  Lab Results   Component Value Date    TSH 1.06 01/01/2021     No results found for: LITHIUM  No results found for: VALPROATE, CBMZ    RISK ASSESSMENT: Low risk of harm to self, Low risk of harm to others. Low to moderate risk of aggression. Treatment Plan:  Reviewed current Medications with the patient. The patient continues to refuse anything other than Abilify. We will continue with Abilify  monitor for need and use of as needed medications    Risks, benefits, side effects, drug-to-drug interactions and alternatives to treatment were discussed. The patient has consented to treatment. Encourage patient to attend group and other milieu activities. Discharge planning discussed with the patient and treatment team.  Follow-up daily while inpatient      PSYCHOTHERAPY/COUNSELING:  [] Therapeutic interview  [x] Supportive  [] CBT  [] Ongoing  [] Other    [x] Patient continues to need, on a daily basis, active treatment furnished directly by or requiring the supervision of inpatient psychiatric personnel      Anticipated Length of stay:1-2 days                                       --Karl Barroso MD on 1/14/2021 at 2:49 PM    An electronic signature was used to authenticate this note.

## 2021-01-14 NOTE — GROUP NOTE
Group Therapy Note    Date: 1/14/2021    Group Start Time: 1100  Group End Time: 4034  Group Topic: Psychoeducation    Vane Lion        Group Therapy Note    Attendees: 6/15         Patient's Goal:  To increase interpersonal interaction. Notes:  Pt attended and participated in group. Status After Intervention:  Improved    Participation Level:  Active Listener and Interactive    Participation Quality: Appropriate and Attentive      Speech:  normal      Thought Process/Content: Logical      Affective Functioning: Congruent      Mood: euthymic      Level of consciousness:  Alert and Attentive      Response to Learning: Progressing to goal      Endings: None Reported    Modes of Intervention: Socialization, Problem-solving, Activity, Media and Reality-testing      Discipline Responsible: Psychoeducational Specialist      Signature:  Abi Pleitez

## 2021-01-15 PROCEDURE — 99232 SBSQ HOSP IP/OBS MODERATE 35: CPT | Performed by: PSYCHIATRY & NEUROLOGY

## 2021-01-15 PROCEDURE — 6370000000 HC RX 637 (ALT 250 FOR IP): Performed by: NURSE PRACTITIONER

## 2021-01-15 PROCEDURE — 1240000000 HC EMOTIONAL WELLNESS R&B

## 2021-01-15 PROCEDURE — 6370000000 HC RX 637 (ALT 250 FOR IP): Performed by: PSYCHIATRY & NEUROLOGY

## 2021-01-15 RX ADMIN — DICLOFENAC SODIUM 2 G: 10 GEL TOPICAL at 21:06

## 2021-01-15 RX ADMIN — LISINOPRIL 20 MG: 20 TABLET ORAL at 08:13

## 2021-01-15 RX ADMIN — ACETAMINOPHEN 650 MG: 325 TABLET, FILM COATED ORAL at 21:05

## 2021-01-15 RX ADMIN — DICLOFENAC SODIUM 2 G: 10 GEL TOPICAL at 16:47

## 2021-01-15 RX ADMIN — DICLOFENAC SODIUM 2 G: 10 GEL TOPICAL at 12:30

## 2021-01-15 RX ADMIN — ACETAMINOPHEN 650 MG: 325 TABLET, FILM COATED ORAL at 09:26

## 2021-01-15 RX ADMIN — IBUPROFEN 600 MG: 600 TABLET, FILM COATED ORAL at 08:54

## 2021-01-15 RX ADMIN — LAMOTRIGINE 200 MG: 100 TABLET ORAL at 08:13

## 2021-01-15 RX ADMIN — DICLOFENAC SODIUM 2 G: 10 GEL TOPICAL at 08:14

## 2021-01-15 RX ADMIN — ARIPIPRAZOLE 30 MG: 30 TABLET ORAL at 08:13

## 2021-01-15 RX ADMIN — IBUPROFEN 600 MG: 600 TABLET, FILM COATED ORAL at 16:49

## 2021-01-15 ASSESSMENT — PAIN DESCRIPTION - LOCATION
LOCATION: KNEE
LOCATION: KNEE

## 2021-01-15 ASSESSMENT — PAIN SCALES - GENERAL: PAINLEVEL_OUTOF10: 5

## 2021-01-15 NOTE — GROUP NOTE
Group Therapy Note    Date: 1/15/2021    Group Start Time: 1600  Group End Time: 1700  Group Topic: Relaxation    OSMEL King LPN        Group Therapy Note    Attendees: 6/12         Patient's Goal: Deep relaxation skills      Status After Intervention:  Unchanged    Participation Level: Interactive    Participation Quality: Sharing      Speech:  normal and loud      Thought Process/Content: Flight of ideas      Affective Functioning: Congruent      Mood: irritable      Level of consciousness:  Alert      Response to Learning: Progressing to goal      Endings: None Reported    Modes of Intervention: Activity      Discipline Responsible: Licensed Practical Nurse      Signature:  Mckenzie King LPN

## 2021-01-15 NOTE — GROUP NOTE
Group Therapy Note    Date: 1/15/2021    Group Start Time: 1330  Group End Time: 1430  Group Topic: Cognitive Skills    [facility]    [place], [place]        Group Therapy Note    Attendees: 5/11           Patient's Goal:  To increase interpersonal interaction. Notes:  Pt attended and participated in group. Status After Intervention:  Improved    Participation Level:  Active Listener and Interactive    Participation Quality: Appropriate and Attentive      Speech:  normal      Thought Process/Content: Logical      Affective Functioning: Congruent      Mood: euthymic      Level of consciousness:  Alert and Attentive      Response to Learning: Progressing to goal      Endings: None Reported    Modes of Intervention: Education, Support, Exploration and Reality-testing      Discipline Responsible: Psychoeducational Specialist      Signature:  Silva Malhotra

## 2021-01-15 NOTE — GROUP NOTE
Group Therapy Note    Date: 1/15/2021    Group Start Time: 1000  Group End Time: 0470  Group Topic: Psychoeducation    [facility]    [place], [place]        Group Therapy Note    Attendees: 9/15         Patient's Goal:  To increase interpersonal interaction. Notes:  Pt attended and participated in group. Status After Intervention:  Improved    Participation Level:  Active Listener and Interactive    Participation Quality: Appropriate and Attentive      Speech:  normal      Thought Process/Content: Logical      Affective Functioning: Congruent      Mood: euthymic      Level of consciousness:  Alert and Attentive      Response to Learning: Progressing to goal      Endings: None Reported    Modes of Intervention: Socialization, Problem-solving, Activity, Media and Reality-testing      Discipline Responsible: Psychoeducational Specialist      Signature:  Kenya Childress

## 2021-01-15 NOTE — PROGRESS NOTES
BEHAVIORAL HEALTH FOLLOW-UP NOTE     1/15/2021     Patient was seen and examined in person, Chart reviewed   Patient's case discussed with staff/team    Chief Complaint: Acute psychosis    Interim History:   The patient was complaining that I do not spend much time with him to know what is going on with him. The patient started speaking about all kinds of complaints he had about the care he was receiving. He was difficult to interrupt. The patient has refused to take Depakote. He does not see the need for it. The patient has been treated with Ambien by his outpatient psychiatrist.  He said he has filed grievances. He is expecting a call from the office of [employer] [facility]. The patient then started clapping his hands and ended up with slapping his face a couple of times. The patient was difficult to redirect. The interview was terminated. Appetite:   [x] Normal/Unchanged  [] Increased  [] Decreased      Sleep:       [x] Normal/Unchanged  [x] Fair       [] Poor              Energy:    [x] Normal/Unchanged  [] Increased  [] Decreased        Aggression:  [x] yes  [] no    Patient is [x] able  [] unable to CONTRACT FOR SAFETY ON THE UNIT    PAST MEDICAL/PSYCHIATRIC HISTORY:   Past Medical History:   Diagnosis Date    Arthritis     Bilateral posterior subcapsular polar cataract     Bipolar 1 disorder ([address]     Brief psychotic disorder ([address]     Chronic pain of right knee     Hypertension     Tinnitus of both ears        FAMILY/SOCIAL HISTORY:  No family history on file.   Social History     Socioeconomic History    Marital status: Single     Spouse name: Not on file    Number of children: Not on file    Years of education: Not on file    Highest education level: Not on file   Occupational History    Not on file   Social Needs    Financial resource strain: Not on file    Food insecurity     Worry: Not on file     Inability: Not on file    Transportation needs     Medical: Not on file     Non-medical: Q4H PRN, Nelida Covarrubias MD, 650 mg at 01/15/21 0926    aluminum & magnesium hydroxide-simethicone (MAALOX) 200-200-20 MG/5ML suspension 30 mL, 30 mL, Oral, Q6H PRN, Nelida Covarrubias MD    hydrOXYzine (ATARAX) tablet 50 mg, 50 mg, Oral, TID PRN, Nelida Covarrubias MD, 50 mg at 01/11/21 1224    nicotine polacrilex (NICORETTE) gum 2 mg, 2 mg, Oral, PRN, Nelida Covarrubias MD    polyethylene glycol (GLYCOLAX) packet 17 g, 17 g, Oral, Daily PRN, Nelida Covarrubias MD    lisinopril (PRINIVIL;ZESTRIL) tablet 20 mg, 20 mg, Oral, Daily, Gina Archer MD, 20 mg at 01/15/21 0813    haloperidol lactate (HALDOL) injection 5 mg, 5 mg, Intramuscular, Q4H PRN **OR** haloperidol (HALDOL) tablet 5 mg, 5 mg, Oral, Q4H PRN, Gina Archer MD, 5 mg at 12/31/20 1159    LORazepam (ATIVAN) tablet 1 mg, 1 mg, Oral, Q4H PRN, 1 mg at 12/31/20 1159 **OR** LORazepam (ATIVAN) injection 1 mg, 1 mg, Intramuscular, Q4H PRN, Gina Archer MD      Examination:  /72   Pulse 70   Temp 97.8 °F (36.6 °C) (Oral)   Resp 14   SpO2 96%   Gait - steady  Medication side effects(SE): none.      Mental Status Examination:    Level of consciousness:  within normal limits   Appearance: Well-kempt with good grooming and good hygiene   behavior/Motor: Slapping himself in the face toward the end of the interview  Attitude toward examiner: Hostile and aggressive  Speech: Hyperverbal and pressured, difficult to interrupt and loude  mood: Irritable  Affect: Mood congruent  Thought processes: Loose associations   thought content:  Homicidal ideation - none  Suicidal Ideation:  denies suicidal ideation  Delusions:  grandiose  Perceptual Disturbance:  denies any perceptual disturbance  Cognition:  oriented to person, place, and time   Concentration distractible  Insight poor  Judgement poor     ASSESSMENT:   Patient symptoms are:  [] Well controlled  [x] Improving  [] Worsening  [] No change      Diagnosis:   Active Problems:    Kalie ([address]  Resolved Problems:    * No resolved hospital problems. *      LABS:    No results for input(s): WBC, HGB, PLT in the last 72 hours. No results for input(s): NA, K, CL, CO2, BUN, CREATININE, GLUCOSE in the last 72 hours. No results for input(s): BILITOT, ALKPHOS, AST, ALT in the last 72 hours. No results found for: Charlies Stands, LABBENZ, CANNAB, COCAINESCRN, LABMETH, OPIATESCREENURINE, PHENCYCLIDINESCREENURINE, PPXUR, ETOH  Lab Results   Component Value Date    TSH 1.06 01/01/2021     No results found for: LITHIUM  No results found for: VALPROATE, CBMZ    RISK ASSESSMENT: Low risk of harm to self, Low risk of harm to others. Low to moderate risk of aggression. Treatment Plan:  Reviewed current Medications with the patient. Add Ambien 5 mg nightly. Continue Abilify as above  monitor for need and use of as needed medications    Risks, benefits, side effects, drug-to-drug interactions and alternatives to treatment were discussed. The patient has consented to treatment. Encourage patient to attend group and other milieu activities. Discharge planning discussed with the patient and treatment team.  Follow-up daily while inpatient      PSYCHOTHERAPY/COUNSELING:  [] Therapeutic interview  [x] Supportive  [] CBT  [] Ongoing  [] Other    [x] Patient continues to need, on a daily basis, active treatment furnished directly by or requiring the supervision of inpatient psychiatric personnel      Anticipated Length of stay: 3-5 days                                       --Eldon Barroso MD on 1/15/2021 at 10:42 AM    An electronic signature was used to authenticate this note.

## 2021-01-15 NOTE — PLAN OF CARE
Problem: Altered Mood, Depressive Behavior:  Goal: Ability to disclose and discuss suicidal ideas will improve  Description: Ability to disclose and discuss suicidal ideas will improve  Outcome: Ongoing   Denies suicidal ideations.

## 2021-01-15 NOTE — SUICIDE SAFETY PLAN
SAFETY PLAN    A suicide Safety Plan is a document that supports someone when they are having thoughts of suicide. Warning Signs that indicate a suicidal crisis may be developing: What (situations, thoughts, feelings, body sensations, behaviors, etc.) do you experience that lets you know you are beginning to think about suicide? 1. Go off medications  2. Mood is depressed and start to feel sad, hopeless, helpless, guilty, decline in self-esteem, excess worry, no interest in doing any pleasurable activities, unable to concentrate  3. Begin to cry over the smallest of things  4. Not eating or sleeping as normal  5. Relationship issues start happening  6. I become angry and start a fight  7. When I dont listen or respond to people in a good, positive way  8. Increase drug use      Internal Coping Strategies:  What things can I do (relaxation techniques, hobbies, physical activities, etc.) to take my mind off my problems without contacting another person? 1. Go to hospital discharge appointments and follow-up with community mental health counseling  2. Talk with other people  3. Learn to identify and control your emotions by new ways  4. Think before you speak or act; walk away from the situation  5. Join a support group in person or on Social Media  6. Take a time-out  7. Take deep breaths; use relaxation techniques  8. Get some exercise; go for a walk  9. Read; listen to music; watch a funny movie    10. Coping skills/ strategies - journal/ listen to music/ go for a walk/ read a book/ watch a funny movie/show / crafts / video game   11.  Grounding techniques- eat a sour candy or hot cinnamon candy / focus on colors, sounds, smells, textures on things around you / drink some herbal tea / eat a piece of dark chocolate / take a hot bath or shower / essential oils for smelling / meditate / color / arts and crafts    People whom I can ask for help: Who can I call when I need help - for example, friends, family, clergy, someone else? 1. Friends and family    Professionals or [address] [facility] [address] I can contact during a crisis: Who can I call for help - for example, my doctor, my psychiatrist, my psychologist, a mental health provider, a suicide hotline? 1. [facility] 24/7 crisis line 7-815.461.3419  2. Suicide Prevention Lifeline: 5-409-586-TALK (9469)  3. [facility] Hotline: 2-1-1    [address] [place] [address] Emergency Services - for example, [address], [facility] suicide hotline,   1. [address] [facility], 4-144.440.4811  2. National Association of Mental Illness, 1-803.218.6721  3. [facility] Substance Abuse National Helpline, 6-110-688-HELP (5821)  4. Crisis Text Line, Text 4HOPE to 460234 to connect with a crisis counselor  5. [facility], 6-490.244.4988  6. [facility] ([place], [address]), 9-936.519.2992  7. [employer] (Alcohol / Drug help)  8. Call the Recovery Helpline at 14 878 519 (24 hours a day - 7 days a week)  9. COVID-19 Emotional Support Line: 253.779.6860    Making the environment safe: How can I make my environment (house/apartment/living space) safer? For example, can I remove guns, medications, and other items? 1. Remove unsafe objects  2. Keep Medications in safe and secure location  3.  Plan daily goals to help remember to stay on specific medications

## 2021-01-15 NOTE — PLAN OF CARE
Problem: Altered Mood, Depressive Behavior:  Goal: Able to verbalize and/or display a decrease in depressive symptoms  Description: Able to verbalize and/or display a decrease in depressive symptoms  1/15/2021 1247 by Giorgio Miller  Outcome: Ongoing   Pt. Again refused to take Depakote but took other meds. Pt is irritable at times. Tangential and at desk frequently demanding \"[facility]\" talk to him. Pt states he knows he is at [facility]. Pt is attending groups. Eats well/ Appropriate self care noted. Pt does redirect away from the desk when he gets loud and has pressured, loud speech. Pt does remain in control. Problem: Altered Mood, Depressive Behavior:  Goal: Ability to disclose and discuss suicidal ideas will improve  Description: Ability to disclose and discuss suicidal ideas will improve  1/15/2021 1247 by Giorgio Miller  Outcome: Ongoing   Pt denies suicidal thoughts. Focused on his Mother and says he is very concerned about her well being. Pt says he will call to check on her. Pt. Remains safe on the unit. Q 15 minute checks for safety maintained.

## 2021-01-15 NOTE — PLAN OF CARE
Irritable  Preception: [place] to Person, [place] to Time, [place] to Place  Attention:Normal: No  Attention: Hyperalert, Unable to Concentrate  Thought Processes: Tangential, Flt.of Ideas  Thought Content:Normal: No  Thought Content: Preoccupations, Obsessions  Hallucinations: None  Delusions: Yes  Delusions: Grandeur  Memory:Normal: Yes  Memory: Confabulation  Insight and Judgment: No  Insight and Judgment: Poor Judgment, Poor Insight  Present Suicidal Ideation: No  Present Homicidal Ideation: No    Daily Assessment Last Entry:   Daily Sleep (WDL): Within Defined Limits         Patient Currently in Pain: Yes  Daily Nutrition (WDL): Within Defined Limits    Patient Monitoring:  Frequency of Checks: 4 times per hour, close(Every 15 minute checks for safety)    Psychiatric Symptoms:   Depression Symptoms  Depression Symptoms: Impaired concentration  Anxiety Symptoms  Anxiety Symptoms: Generalized, Obsessions  Kalie Symptoms  Kalie Symptoms: Flight of ideas, Pressured speech, Rapid cycling     Psychosis Symptoms  Delusion Type: No problems reported or observed. Suicide Risk CSSR-S:  1) Within the past month, have you wished you were dead or wished you could go to sleep and not wake up? : (ZANA)  2) Have you actually had any thoughts of killing yourself? : (ZANA)  6) Have you ever done anything, started to do anything, or prepared to do anything to end your life?: (ZANA)  Change in Result NO Change in Plan of care NO    EDUCATION:   Learner Progress Toward Treatment Goals: Reviewed goals and plan of care    Method: Small group    Outcome: No evidence of Learning    PATIENT GOALS: Short-term: Call mom; Review financial situation; Take medications;  Go to groups  Long-term: Patient unable to identify at this time    PLAN/TREATMENT RECOMMENDATIONS UPDATE:   COPING SKILLS CONTINUE WITH GROUP THERAPIES POSITIVE INTERACTIONS, GOAL SETTING    SHORT-TERM GOALS UPDATE:  Time frame for Short-Term Goals: 1-2 WEEKS     LONG-TERM GOALS UPDATE:  Time frame for Long-Term Goals: 6 MONTHS  Members Present in Team Meeting: See Signature Sheet    Jez Poon

## 2021-01-15 NOTE — GROUP NOTE
Group Therapy Note    Date: 1/15/2021    Group Start Time: 0900  Group End Time: 0915  Group Topic: Community Meeting    OSMEL GALICIA    Natalya Norris        Group Therapy Note    Attendees: 9/15       Patient's Goal:  To orient to unit and set a daily goal    Notes:  Patient attended and participated in group. Daily goal:Have doctor call his sister and his partner; shave. When given the opportunity to talk, was tangential and having flights of ideas. Was easily redirectable during conversations    Status After Intervention:  Improved    Participation Level:  Active Listener and Interactive    Participation Quality: Appropriate, Attentive and Sharing      Speech:  normal      Thought Process/Content: Logical  Linear      Affective Functioning: Congruent      Mood: euthymic      Level of consciousness:  Alert and Attentive      Response to Learning: Able to verbalize current knowledge/experience and Progressing to goal      Endings: None Reported    Modes of Intervention: Education, Support, Socialization, Exploration, Problem-solving and Reality-testing      Discipline Responsible: Psychoeducational Specialist      Signature:  Natalya Norris

## 2021-01-16 PROCEDURE — 1240000000 HC EMOTIONAL WELLNESS R&B

## 2021-01-16 PROCEDURE — 99232 SBSQ HOSP IP/OBS MODERATE 35: CPT | Performed by: NURSE PRACTITIONER

## 2021-01-16 PROCEDURE — 6370000000 HC RX 637 (ALT 250 FOR IP): Performed by: PSYCHIATRY & NEUROLOGY

## 2021-01-16 PROCEDURE — 6370000000 HC RX 637 (ALT 250 FOR IP): Performed by: NURSE PRACTITIONER

## 2021-01-16 RX ORDER — ZOLPIDEM TARTRATE 5 MG/1
5 TABLET ORAL NIGHTLY
Status: DISCONTINUED | OUTPATIENT
Start: 2021-01-16 | End: 2021-01-18 | Stop reason: HOSPADM

## 2021-01-16 RX ADMIN — LAMOTRIGINE 200 MG: 100 TABLET ORAL at 08:06

## 2021-01-16 RX ADMIN — DICLOFENAC SODIUM 2 G: 10 GEL TOPICAL at 17:51

## 2021-01-16 RX ADMIN — DICLOFENAC SODIUM 2 G: 10 GEL TOPICAL at 08:06

## 2021-01-16 RX ADMIN — ZOLPIDEM TARTRATE 5 MG: 5 TABLET ORAL at 23:13

## 2021-01-16 RX ADMIN — IBUPROFEN 600 MG: 600 TABLET, FILM COATED ORAL at 11:19

## 2021-01-16 RX ADMIN — ARIPIPRAZOLE 30 MG: 30 TABLET ORAL at 08:05

## 2021-01-16 RX ADMIN — IBUPROFEN 600 MG: 600 TABLET, FILM COATED ORAL at 18:42

## 2021-01-16 RX ADMIN — LISINOPRIL 20 MG: 20 TABLET ORAL at 08:05

## 2021-01-16 ASSESSMENT — PAIN SCALES - GENERAL
PAINLEVEL_OUTOF10: 2
PAINLEVEL_OUTOF10: 6

## 2021-01-16 NOTE — PLAN OF CARE
Problem: Altered Mood, Depressive Behavior:  Goal: Able to verbalize and/or display a decrease in depressive symptoms  Description: Able to verbalize and/or display a decrease in depressive symptoms  1/15/2021 2139 by Yenny Burton RN  Outcome: Ongoing   States he is not depressed & does not need tx here.

## 2021-01-16 NOTE — PROGRESS NOTES
BEHAVIORAL HEALTH FOLLOW-UP NOTE     1/16/2021     Patient was seen and examined in person, Chart reviewed   Patient's case discussed with staff/team    Chief Complaint: Acute psychosis    Interim History:   Staff reports no overnight events, patient is selectively compliant with medication. He is refusing to take his Depakote. He is compliant with taking Lamictal and Abilify. According to yesterday's progress note Ambien 5 mg nightly was ordered. Patient was interviewed today in the common area. He reluctantly agrees to interview and is quite sarcastic throughout. He reports that our conversation is pointless because we are unwilling to put him on his home medications. He rarely responds appropriately to a question when asked, most often he responds with the question back to this author. When asked about group attendance patient reports that he is attending groups so that he can educate other patients in their rights. He reports that the groups are not beneficial to him because he does not need them. He was very focused on billing and believes that he, nor insurance, will be paying for his visit here and that the stay will come out of the provider pockets. He remains quite confrontational throughout attempted mental status exam, after multiple attempts the interview was terminated.       Appetite:   [x] Normal/Unchanged  [] Increased  [] Decreased      Sleep:       [] Normal/Unchanged  [x] Fair       [] Poor              Energy:    [x] Normal/Unchanged  [] Increased  [] Decreased        Aggression:  [x] yes  [] no    Patient is [x] able  [] unable to CONTRACT FOR SAFETY ON THE UNIT    PAST MEDICAL/PSYCHIATRIC HISTORY:   Past Medical History:   Diagnosis Date    Arthritis     Bilateral posterior subcapsular polar cataract     Bipolar 1 disorder ([address]     Brief psychotic disorder ([facility]     Chronic pain of right knee     Hypertension     Tinnitus of both ears        FAMILY/SOCIAL HISTORY:  No family history on file. Social History     Socioeconomic History    Marital status: Single     Spouse name: Not on file    Number of children: Not on file    Years of education: Not on file    Highest education level: Not on file   Occupational History    Not on file   Social Needs    Financial resource strain: Not on file    Food insecurity     Worry: Not on file     Inability: Not on file    Transportation needs     Medical: Not on file     Non-medical: Not on file   Tobacco Use    Smoking status: Former Smoker     Types: Cigars     Quit date: 2020     Years since quittin.9    Smokeless tobacco: Never Used   Substance and Sexual Activity    Alcohol use: No    Drug use: No    Sexual activity: Never   Lifestyle    Physical activity     Days per week: Not on file     Minutes per session: Not on file    Stress: Not on file   Relationships    Social connections     Talks on phone: Not on file     Gets together: Not on file     Attends Jainism service: Not on file     Active member of club or organization: Not on file     Attends meetings of clubs or organizations: Not on file     Relationship status: Not on file    Intimate partner violence     Fear of current or ex partner: Not on file     Emotionally abused: Not on file     Physically abused: Not on file     Forced sexual activity: Not on file   Other Topics Concern    Not on file   Social History Narrative    Not on file           ROS:  [x] All negative/unchanged except if checked.  Explain positive(checked items) below:  [] Constitutional  [] Eyes  [] Ear/Nose/Mouth/Throat  [] Respiratory  [] CV  [] GI  []   [x] Musculoskeletal  [] Skin/Breast  [] Neurological  [] Endocrine  [] Heme/Lymph  [] Allergic/Immunologic    Explanation: Joint ache    MEDICATIONS:    Current Facility-Administered Medications:     divalproex (DEPAKOTE ER) extended release tablet 500 mg, 500 mg, Oral, BID, Charisse Young MD    ARIPiprazole (ABILIFY) tablet 30 mg, 30 mg, Oral, Daily, Erin Cazares MD, 30 mg at 01/16/21 0805    diclofenac sodium (VOLTAREN) 1 % gel 2 g, 2 g, Topical, 4x Daily, Laura Goldman APRN - CNP, 2 g at 01/16/21 0806    ibuprofen (ADVIL;MOTRIN) tablet 600 mg, 600 mg, Oral, Q6H PRN, Bandar Cortes APRN - CNP, 600 mg at 01/16/21 1119    lamoTRIgine (LAMICTAL) tablet 200 mg, 200 mg, Oral, Daily, Erin Cazares MD, 200 mg at 01/16/21 0806    acetaminophen (TYLENOL) tablet 650 mg, 650 mg, Oral, Q4H PRN, Chapis Bright MD, 650 mg at 01/15/21 2105    aluminum & magnesium hydroxide-simethicone (MAALOX) 200-200-20 MG/5ML suspension 30 mL, 30 mL, Oral, Q6H PRN, Chapis Bright MD    hydrOXYzine (ATARAX) tablet 50 mg, 50 mg, Oral, TID PRN, Chapis Bright MD, 50 mg at 01/11/21 1224    nicotine polacrilex (NICORETTE) gum 2 mg, 2 mg, Oral, PRN, Chapis Bright MD    polyethylene glycol (GLYCOLAX) packet 17 g, 17 g, Oral, Daily PRN, Chapis Bright MD    lisinopril (PRINIVIL;ZESTRIL) tablet 20 mg, 20 mg, Oral, Daily, Erin Cazares MD, 20 mg at 01/16/21 0805    haloperidol lactate (HALDOL) injection 5 mg, 5 mg, Intramuscular, Q4H PRN **OR** haloperidol (HALDOL) tablet 5 mg, 5 mg, Oral, Q4H PRN, Erin Cazares MD, 5 mg at 12/31/20 1159    LORazepam (ATIVAN) tablet 1 mg, 1 mg, Oral, Q4H PRN, 1 mg at 12/31/20 1159 **OR** LORazepam (ATIVAN) injection 1 mg, 1 mg, Intramuscular, Q4H PRN, Erin Cazares MD      Examination:  /86   Pulse 68   Temp 98.7 °F (37.1 °C) (Oral)   Resp 14   SpO2 96%   Gait - steady  Medication side effects(SE): none.      Mental Status Examination:    Level of consciousness: Awake and alert  Appearance: Well-kempt with good grooming and good hygiene   Behavior/Motor: Sits with arms crossed over chest  Attitude toward examiner: Hostile and Sarcastic  Speech: Hyperverbal with normal volume and irritable tone  mood: Irritable  Affect: Mood congruent  Thought processes: Loose associations, difficult to redirect   thought content:  Homicidal ideation - none  Suicidal Ideation:  denies suicidal ideation  Delusions:  Very grandiose  Perceptual Disturbance:  denies any perceptual disturbance  Cognition:  oriented to person, place, and time   Concentration distractible  Insight poor  Judgement poor     ASSESSMENT:   Patient symptoms are:  [] Well controlled  [x] Improving  [] Worsening  [] No change      Diagnosis:   Active Problems:    Kalie ([address]  Resolved Problems:    * No resolved hospital problems. *      LABS:    No results for input(s): WBC, HGB, PLT in the last 72 hours. No results for input(s): NA, K, CL, CO2, BUN, CREATININE, GLUCOSE in the last 72 hours. No results for input(s): BILITOT, ALKPHOS, AST, ALT in the last 72 hours. No results found for: [place], LABBENZ, CANNAB, COCAINESCRN, LABMETH, OPIATESCREENURINE, PHENCYCLIDINESCREENURINE, PPXUR, ETOH  Lab Results   Component Value Date    TSH 1.06 01/01/2021     No results found for: LITHIUM  No results found for: VALPROATE, CBMZ    RISK ASSESSMENT: Low risk of harm to self, Low risk of harm to others. Low to moderate risk of aggression. Treatment Plan:  Reviewed current Medications with the patient. Behzad added per Dr. Thomson Began note. Continue all other previously prescribed medication and encourage medication compliance. Monitor for need and frequency of as needed medication    Risks, benefits, side effects, drug-to-drug interactions and alternatives to treatment were discussed. The patient has consented to treatment. Encourage patient to attend group and other milieu activities.   Discharge planning discussed with the patient and treatment team.  Follow-up daily while inpatient      PSYCHOTHERAPY/COUNSELING:  [] Therapeutic interview  [x] Supportive  [] CBT  [] Ongoing  [] Other    [x] Patient continues to need, on a daily basis, active treatment furnished directly by or requiring the supervision of inpatient psychiatric personnel      Anticipated Length of stay: 3-5 days                                       --Michael Briones, BETTY - CNP on 1/16/2021 at 12:50 PM    An electronic signature was used to authenticate this note.

## 2021-01-16 NOTE — PLAN OF CARE
Problem: Altered Mood, Depressive Behavior:  Goal: Ability to disclose and discuss suicidal ideas will improve  Description: Ability to disclose and discuss suicidal ideas will improve  Margo Salomon currently denies any thoughts of self harm. He's medication compliant and his behavior is well controlled. He seems brighter today. Anxiety decreasing & attends groups.    Outcome: Ongoing

## 2021-01-16 NOTE — GROUP NOTE
Group Therapy Note    Date: 1/16/2021    Group Start Time: 1011  Group End Time: 1905  Group Topic: Psychoeducation    OSMEL GALICIA    SUMEET Grewal LSW        Group Therapy Note    Attendees: 6/13         Patient's Goal:  Talk about goal setting and building new habits    Notes:  Therapeutic worksheet was provided    Status After Intervention:  Unchanged    Participation Level:  Active Listener, Interactive and Monopolizing    Participation Quality: Appropriate, Attentive, Sharing, Supportive and Intrusive      Speech:  normal      Thought Process/Content: Logical      Affective Functioning: Congruent      Mood: euthymic      Level of consciousness:  Alert, Oriented x4 and Attentive      Response to Learning: Able to verbalize current knowledge/experience      Endings: None Reported    Modes of Intervention: Education and Support      Discipline Responsible: /Counselor      Signature:  SUMEET Grewal LSW

## 2021-01-16 NOTE — GROUP NOTE
Group Therapy Note    Date: 1/16/2021    Group Start Time: 0900  Group End Time: 0915  Group Topic: Community Meeting    [place]    Attendees: 5         Patient's Goal:  To be informed of programming schedule for today and unit guidelines/rules. To verbalize obtainable an short term goal for today pertaining to mental health and stability that can be achieved by this evening. Notes:  Patient verbalized goal for today to talk with the doctor and to get his nails trimmed. Status After Intervention:  Improved    Participation Level:  Active Listener and Interactive    Participation Quality: Appropriate, Attentive and Sharing      Speech:  normal      Thought Process/Content: Logical      Affective Functioning: Congruent      Mood: euthymic      Level of consciousness:  Alert, Oriented x4 and Attentive      Response to Learning: Able to verbalize current knowledge/experience, Able to verbalize/acknowledge new learning, Able to retain information, Capable of insight and Progressing to goal      Endings: None Reported       Modes of Intervention: Support, Socialization, Exploration, Clarifying, Problem-solving, Confrontation, Limit-setting and Reality-testing      Discipline Responsible: Psychoeducational Specialist      Signature:  Freida Moscoso

## 2021-01-16 NOTE — GROUP NOTE
Group Therapy Note    Date: 1/16/2021    Group Start Time: 1600  Group End Time: 36  Group Topic: Healthy Living/Wellness    OSMEL Tapia LPN        Group Therapy Note     Attendees: 8          Patient's Goal: Stress Relief    Notes:      Status After Intervention:  Unchanged    Participation Level:  Active Listener    Participation Quality: Appropriate      Speech:  normal      Thought Process/Content: Logical      Affective Functioning: Congruent      Mood: elevated      Level of consciousness:  Alert      Response to Learning: Able to retain information      Endings: None Reported    Modes of Intervention: Education      Discipline Responsible: Licensed Practical Nurse      Signature:  Jason Armas LPN

## 2021-01-16 NOTE — GROUP NOTE
Group Therapy Note    Date: 1/16/2021    Group Start Time: 1330  Group End Time: 3833  Group Topic: Recreational    [facility]    Jennifer Spine, [address]    Attendees: 6         Patient's Goal:  To demonstrate increased interpersonal skills. Notes:  Patient attended group and actively participated in task at hand. Patient conversed appropriately with peers and Zaki Bell. Status After Intervention:  Improved    Participation Level:  Active Listener and Interactive    Participation Quality: Appropriate, Attentive and Sharing      Speech:  normal      Thought Process/Content: Logical      Affective Functioning: Congruent      Mood: euthymic      Level of consciousness:  Alert, Oriented x4 and Attentive      Response to Learning: Able to verbalize current knowledge/experience, Able to verbalize/acknowledge new learning, Able to retain information, Capable of insight and Progressing to goal      Endings: None Reported       Modes of Intervention: Socialization, Exploration, Clarifying, Problem-solving, Activity, Confrontation, Limit-setting and Reality-testing      Discipline Responsible: Psychoeducational Specialist      Signature:  Steve Lilly

## 2021-01-16 NOTE — GROUP NOTE
Group Therapy Note    Date: 1/16/2021    Group Start Time: 1100  Group End Time: 1120  Group Topic: Healthy Living/Wellness    [facility] Cheryle Gone, LPN        Group Therapy Note    Attendees: 6           Patient's Goal:  To manage stress & anxiety.     Notes:      Status After Intervention:  Unchanged    Participation Level: Interactive    Participation Quality: Attentive      Speech:  normal      Thought Process/Content: Logical      Affective Functioning: Blunted      Mood: anxious      Level of consciousness:  Oriented x4      Response to Learning: Able to verbalize/acknowledge new learning      Endings: Self-harm    Modes of Intervention: Support      Discipline Responsible: LPN      Signature:  Rosibel Rodriguez LPN

## 2021-01-16 NOTE — PROGRESS NOTES
Med refusal:  Refused Depakote with reasoning being a long obtuse response which was difficult to follow.

## 2021-01-17 PROCEDURE — 1240000000 HC EMOTIONAL WELLNESS R&B

## 2021-01-17 PROCEDURE — 99231 SBSQ HOSP IP/OBS SF/LOW 25: CPT | Performed by: NURSE PRACTITIONER

## 2021-01-17 PROCEDURE — 6370000000 HC RX 637 (ALT 250 FOR IP): Performed by: PSYCHIATRY & NEUROLOGY

## 2021-01-17 PROCEDURE — 6370000000 HC RX 637 (ALT 250 FOR IP): Performed by: NURSE PRACTITIONER

## 2021-01-17 RX ADMIN — ACETAMINOPHEN 650 MG: 325 TABLET, FILM COATED ORAL at 12:11

## 2021-01-17 RX ADMIN — IBUPROFEN 600 MG: 600 TABLET, FILM COATED ORAL at 18:14

## 2021-01-17 RX ADMIN — LISINOPRIL 20 MG: 20 TABLET ORAL at 08:32

## 2021-01-17 RX ADMIN — ARIPIPRAZOLE 30 MG: 30 TABLET ORAL at 08:32

## 2021-01-17 RX ADMIN — ZOLPIDEM TARTRATE 5 MG: 5 TABLET ORAL at 23:32

## 2021-01-17 RX ADMIN — LAMOTRIGINE 200 MG: 100 TABLET ORAL at 08:32

## 2021-01-17 RX ADMIN — DICLOFENAC SODIUM 2 G: 10 GEL TOPICAL at 17:36

## 2021-01-17 RX ADMIN — ACETAMINOPHEN 650 MG: 325 TABLET, FILM COATED ORAL at 23:32

## 2021-01-17 RX ADMIN — DICLOFENAC SODIUM 2 G: 10 GEL TOPICAL at 23:32

## 2021-01-17 RX ADMIN — DICLOFENAC SODIUM 2 G: 10 GEL TOPICAL at 08:32

## 2021-01-17 ASSESSMENT — PAIN SCALES - GENERAL
PAINLEVEL_OUTOF10: 4
PAINLEVEL_OUTOF10: 5
PAINLEVEL_OUTOF10: 6

## 2021-01-17 ASSESSMENT — PAIN DESCRIPTION - PAIN TYPE: TYPE: CHRONIC PAIN

## 2021-01-17 ASSESSMENT — PAIN DESCRIPTION - PROGRESSION: CLINICAL_PROGRESSION: GRADUALLY IMPROVING

## 2021-01-17 ASSESSMENT — PAIN DESCRIPTION - LOCATION: LOCATION: HEAD

## 2021-01-17 NOTE — GROUP NOTE
Group Therapy Note    Date: 1/17/2021    Group Start Time: 1330  Group End Time: 4360  Group Topic: Recreational    STMADELEINE Rodriguez Spine, [address]    Attendees: 3         Patient's Goal:  To demonstrate increased interpersonal skills. Notes:  Patient attended group and participated in task at hand, but needed redirection throughout due to being challenging and accusatory at times towards writer. Patient had labile mood and was happy and laughing, but would then begin to challenge writer and prove a non existent point and test limits. Patient had bizarre thought process and loose associations. Status After Intervention:  Improved    Participation Level:  Active Listener and Interactive    Participation Quality: Attentive and Sharing      Speech:  normal      Thought Process/Content: Flight of ideas      Affective Functioning: Congruent      Mood: labile      Level of consciousness:  Alert and Attentive      Response to Learning: Able to verbalize current knowledge/experience, Able to verbalize/acknowledge new learning, Able to retain information, Capable of insight and Progressing to goal      Endings: None Reported      Modes of Intervention: Socialization, Exploration, Clarifying, Problem-solving, Activity, Limit-setting and Reality-testing      Discipline Responsible: Psychoeducational Specialist      Signature:  Steve Lilly

## 2021-01-17 NOTE — CARE COORDINATION
SW engaged 1:1 at patient's request twice today. SW provided active listening to patient as he stated he felt angry about the lies told to him by staff and stated that the staff that have lied to him will have to answer to [facility] court. Patient also references billing Medicare and the government several times throughout the conversations. Patient makes no requests of SW each time.

## 2021-01-17 NOTE — PLAN OF CARE
Problem: Altered Mood, Depressive Behavior:  Goal: Able to verbalize and/or display a decrease in depressive symptoms  Description: Able to verbalize and/or display a decrease in depressive symptoms  Outcome: Ongoing     Problem: Altered Mood, Depressive Behavior:  Goal: Ability to disclose and discuss suicidal ideas will improve  Description: Ability to disclose and discuss suicidal ideas will improve  Outcome: Ongoing    Patients thoughts remain loose, disorganized, with a flight of ideas. Has angry moments, verbally yells at nurse practitioner, but walks away. Maintains physical control. Rambling speech at times, and multiple requests, needing limits set. Attends most groups. Takes medications.

## 2021-01-17 NOTE — FLOWSHEET NOTE
Patient called the [facility] office from the [facility] and had a rambling conversation with writer; writer unable to follow patient's train of thought and provided listening presence. Patient ended the conversation abruptly after expressing his thoughts.  Writer alerted patient's nurse, Loyda Banda of patient's call to [facility]; nurse stated patient not appropriate to visit at this time;     01/17/21 1530   Encounter Summary   Services provided to: Patient   Continue Visiting   (1/17/21)   Complexity of Encounter Moderate   Length of Encounter 15 minutes   Spiritual Assessment Completed Yes   Spiritual/Jewish   Type Spiritual support   Assessment Anxious   Intervention Active listening   Outcome Engaged in conversation

## 2021-01-17 NOTE — GROUP NOTE
Group Therapy Note    Date: 1/17/2021    Group Start Time: 1100  Group End Time: 3926  Group Topic: Group Documentation    OSMEL LIDIA GALICIA    Maicol Ray        Group Therapy Note    Attendees: 4/12         Patient's Goal:  Attend and participated in wellness group    Notes:  Identify ways to push through obstacles in life    Status After Intervention:  Improved    Participation Level:  Active Listener    Participation Quality: Appropriate, Attentive, Sharing and Supportive      Speech:  normal      Thought Process/Content: Logical      Affective Functioning: Congruent      Mood: anxious      Level of consciousness:  Alert, Oriented x4 and Attentive      Response to Learning: Able to verbalize current knowledge/experience, Able to verbalize/acknowledge new learning, Able to retain information and Capable of insight      Endings: None Reported    Modes of Intervention: Education, Support, Socialization, Exploration and Problem-solving      Discipline Responsible: [facility] [address], [facility]      Signature:  Maicol Ray

## 2021-01-17 NOTE — PLAN OF CARE
Problem: Altered Mood, Depressive Behavior:  Goal: Able to verbalize and/or display a decrease in depressive symptoms  Description: Able to verbalize and/or display a decrease in depressive symptoms  1/17/2021 0103 by Tristan Simon RN  Outcome: Ongoing   Pt is visible in the milieu social with staff and peers. He  eats well at snack and is selective with medication. He is loud, has pressured speech, and frequently attempts to engage staff in arguments. Problem: Altered Mood, Depressive Behavior:  Goal: Ability to disclose and discuss suicidal ideas will improve  Description: Ability to disclose and discuss suicidal ideas will improve  1/17/2021 0103 by Tristan Simon RN  Outcome: Ongoing   Pt denies thoughts of harming themself and verbally agrees to remain safe while on the unit.  No self harming behaviors are noted this shift

## 2021-01-17 NOTE — GROUP NOTE
Group Therapy Note    Date: 1/17/2021    Group Start Time: 1005  Group End Time: 0788  Group Topic: Psychoeducation    OSMEL GALICIA    Daniele Paul MSW, LSW        Group Therapy Note    Attendees: 4/15         Patient's Goal:  Discuss and learn coping skills    Notes:  Therapeutic worksheet was provided    Status After Intervention:  Unchanged    Participation Level:  Active Listener, Interactive and Monopolizing    Participation Quality: Sharing and Supportive      Speech:  normal      Thought Process/Content: Flight of ideas      Affective Functioning: Congruent      Mood: euphoric      Level of consciousness:  Alert, Oriented x4 and Attentive      Response to Learning: Able to verbalize current knowledge/experience      Endings: None Reported    Modes of Intervention: Education and Support      Discipline Responsible: /Counselor      Signature:  Daniele Paul, MSW, LSW

## 2021-01-17 NOTE — PROGRESS NOTES
BEHAVIORAL HEALTH FOLLOW-UP NOTE     1/17/2021     Patient was seen and examined in person, Chart reviewed   Patient's case discussed with staff/team    Chief Complaint: Acute psychosis    Interim History:   Staff reports no overnight events, patient is selectively compliant with medication. He continues to refuse Depakote. He is interviewed shortly after attending group in the hallway. When asked how he slept last night with the Ambien he states \"I got 6 hours but it was not the right dose\". When asked how group was he states \"it was just fine why don't you go ask someone\". Patient states Marly Tavera is your name? \"  This Marzette Field showed him my badge and reintroduced myself as the provider that saw him yesterday. He states \"this is unacceptable where is the physician? \"  He states \"they send you to do their bidding and they are violating my rights\". He begins yelling about not seeing a physician, he is pointing in this author's face and swinging his arms in the air and states \"fine, lets go, lets talk you can accept the liability for them not giving me the appropriate medication\". As the conversation continued he became more angry, yelling and foaming at the mouth. This Marzette Field ended the interview to avoid further escalation in behavior. He continued to yell across the hallway in a very angry tone to give him the correct medication.     Appetite:   [x] Normal/Unchanged  [] Increased  [] Decreased      Sleep:       [] Normal/Unchanged  [x] Fair, improved       [] Poor              Energy:    [x] Normal/Unchanged  [] Increased  [] Decreased        Aggression:  [x] yes  [] no    Patient is [x] able  [] unable to CONTRACT FOR SAFETY ON THE UNIT    PAST MEDICAL/PSYCHIATRIC HISTORY:   Past Medical History:   Diagnosis Date    Arthritis     Bilateral posterior subcapsular polar cataract     Bipolar 1 disorder ([address]     Brief psychotic disorder ([address]     Chronic pain of right knee     Hypertension     Tinnitus of both ears        FAMILY/SOCIAL HISTORY:  No family history on file. Social History     Socioeconomic History    Marital status: Single     Spouse name: Not on file    Number of children: Not on file    Years of education: Not on file    Highest education level: Not on file   Occupational History    Not on file   Social Needs    Financial resource strain: Not on file    Food insecurity     Worry: Not on file     Inability: Not on file    Transportation needs     Medical: Not on file     Non-medical: Not on file   Tobacco Use    Smoking status: Former Smoker     Types: Cigars     Quit date: 2020     Years since quittin.9    Smokeless tobacco: Never Used   Substance and Sexual Activity    Alcohol use: No    Drug use: No    Sexual activity: Never   Lifestyle    Physical activity     Days per week: Not on file     Minutes per session: Not on file    Stress: Not on file   Relationships    Social connections     Talks on phone: Not on file     Gets together: Not on file     Attends Restorationist service: Not on file     Active member of club or organization: Not on file     Attends meetings of clubs or organizations: Not on file     Relationship status: Not on file    Intimate partner violence     Fear of current or ex partner: Not on file     Emotionally abused: Not on file     Physically abused: Not on file     Forced sexual activity: Not on file   Other Topics Concern    Not on file   Social History Narrative    Not on file           ROS:  [x] All negative/unchanged except if checked.  Explain positive(checked items) below:  [] Constitutional  [] Eyes  [] Ear/Nose/Mouth/Throat  [] Respiratory  [] CV  [] GI  []   [x] Musculoskeletal  [] Skin/Breast  [] Neurological  [] Endocrine  [] Heme/Lymph  [] Allergic/Immunologic    Explanation: Joint ache    MEDICATIONS:    Current Facility-Administered Medications:     zolpidem (AMBIEN) tablet 5 mg, 5 mg, Oral, Nightly, BETTY Hightower CNP, 5 mg at 01/16/21 2313    divalproex (DEPAKOTE ER) extended release tablet 500 mg, 500 mg, Oral, BID, Erin Cazares MD    ARIPiprazole (ABILIFY) tablet 30 mg, 30 mg, Oral, Daily, Erin Cazares MD, 30 mg at 01/17/21 3406    diclofenac sodium (VOLTAREN) 1 % gel 2 g, 2 g, Topical, 4x Daily, Laura Goldman, APRN - CNP, 2 g at 01/17/21 2567    ibuprofen (ADVIL;MOTRIN) tablet 600 mg, 600 mg, Oral, Q6H PRN, Bandar Cortes APRN - CNP, 600 mg at 01/16/21 1842    lamoTRIgine (LAMICTAL) tablet 200 mg, 200 mg, Oral, Daily, Erin Cazares MD, 200 mg at 01/17/21 9625    acetaminophen (TYLENOL) tablet 650 mg, 650 mg, Oral, Q4H PRN, Chapis Bright MD, 650 mg at 01/17/21 1211    aluminum & magnesium hydroxide-simethicone (MAALOX) 200-200-20 MG/5ML suspension 30 mL, 30 mL, Oral, Q6H PRN, Chapis Bright MD    hydrOXYzine (ATARAX) tablet 50 mg, 50 mg, Oral, TID PRN, Chapis Bright MD, 50 mg at 01/11/21 1224    nicotine polacrilex (NICORETTE) gum 2 mg, 2 mg, Oral, PRN, Chapis Bright MD    polyethylene glycol (GLYCOLAX) packet 17 g, 17 g, Oral, Daily PRN, Chapis Bright MD    lisinopril (PRINIVIL;ZESTRIL) tablet 20 mg, 20 mg, Oral, Daily, Erin Cazares MD, 20 mg at 01/17/21 2798    haloperidol lactate (HALDOL) injection 5 mg, 5 mg, Intramuscular, Q4H PRN **OR** haloperidol (HALDOL) tablet 5 mg, 5 mg, Oral, Q4H PRN, Erin Cazares MD, 5 mg at 12/31/20 1159    LORazepam (ATIVAN) tablet 1 mg, 1 mg, Oral, Q4H PRN, 1 mg at 12/31/20 1159 **OR** LORazepam (ATIVAN) injection 1 mg, 1 mg, Intramuscular, Q4H PRN, Erin Cazares MD      Examination:  /73   Pulse 70   Temp 98.2 °F (36.8 °C) (Oral)   Resp 14   SpO2 96%   Gait - steady  Medication side effects(SE): none.      Mental Status Examination:    Level of consciousness: Awake and alert  Appearance: Well-kempt with good grooming and good hygiene   Behavior/Motor: Confrontational and demanding  Attitude toward examiner: Hostile, angry and sarcastic  Speech: Hyperverbal with loud and angry tone  mood: Irritable  Affect: Guarded, easily agitated  Thought processes: Loose associations, difficult to redirect   thought content: Medication focused, homicidal ideation - none  Suicidal Ideation:  denies suicidal ideation  Delusions:  Very grandiose  Perceptual Disturbance:  denies any perceptual disturbance  Cognition:  oriented to person, place, and time   Concentration distractible  Insight poor  Judgement poor     ASSESSMENT:   Patient symptoms are:  [] Well controlled  [] Improving  [] Worsening  [x] No change      Diagnosis:   Active Problems:    [facility]  Resolved Problems:    * No resolved hospital problems. *      LABS:    No results for input(s): WBC, HGB, PLT in the last 72 hours. No results for input(s): NA, K, CL, CO2, BUN, CREATININE, GLUCOSE in the last 72 hours. No results for input(s): BILITOT, ALKPHOS, AST, ALT in the last 72 hours. No results found for: Christen Frida, LABBENZ, CANNAB, COCAINESCRN, LABMETH, OPIATESCREENURINE, PHENCYCLIDINESCREENURINE, PPXUR, ETOH  Lab Results   Component Value Date    TSH 1.06 01/01/2021     No results found for: LITHIUM  No results found for: VALPROATE, CBMZ    RISK ASSESSMENT: Low risk of harm to self, Low risk of harm to others. Low to moderate risk of aggression. Treatment Plan:  Reviewed current Medications with the patient. Continue current medication regimen. Encourage medication compliance, specifically of Depakote. Monitor for need and frequency of as needed medication    Risks, benefits, side effects, drug-to-drug interactions and alternatives to treatment were discussed. The patient has consented to treatment. Encourage patient to continue attending group and other milieu activities.   Discharge planning discussed with the patient and treatment team.  Follow-up daily while inpatient      PSYCHOTHERAPY/COUNSELING:  [] Therapeutic interview  [x] Supportive  [] CBT  [] Ongoing  [] Other    [x] Patient continues to need, on a daily basis, active treatment furnished directly by or requiring the supervision of inpatient psychiatric personnel      Anticipated Length of stay: Undetermined at this time                                     --BETTY Tejada CNP on 1/17/2021 at 3:08 PM    An electronic signature was used to authenticate this note.

## 2021-01-17 NOTE — CARE COORDINATION
Patient provided SW with a note that contained his satellite phone number [phone] and asked for the note to be placed in his chart.

## 2021-01-17 NOTE — GROUP NOTE
Group Therapy Note    Date: 1/17/2021    Group Start Time: 0900  Group End Time: 0915  Group Topic: Community Meeting    [facility]    Franki Philip, [address]     Patient refused to attend Goal Setting / Community Meeting Group at 0900 after encouragement from staff. 1:1 talk time offered.     Signature:  Donovan Hale

## 2021-01-18 VITALS
TEMPERATURE: 97.8 F | DIASTOLIC BLOOD PRESSURE: 76 MMHG | RESPIRATION RATE: 16 BRPM | SYSTOLIC BLOOD PRESSURE: 116 MMHG | OXYGEN SATURATION: 96 % | HEART RATE: 65 BPM

## 2021-01-18 PROCEDURE — 6370000000 HC RX 637 (ALT 250 FOR IP): Performed by: PSYCHIATRY & NEUROLOGY

## 2021-01-18 PROCEDURE — 6370000000 HC RX 637 (ALT 250 FOR IP): Performed by: NURSE PRACTITIONER

## 2021-01-18 PROCEDURE — 99239 HOSP IP/OBS DSCHRG MGMT >30: CPT | Performed by: PSYCHIATRY & NEUROLOGY

## 2021-01-18 RX ORDER — ZOLPIDEM TARTRATE 5 MG/1
5 TABLET ORAL NIGHTLY
Qty: 30 TABLET | Refills: 0 | Status: SHIPPED | OUTPATIENT
Start: 2021-01-18 | End: 2021-02-17

## 2021-01-18 RX ADMIN — LISINOPRIL 20 MG: 20 TABLET ORAL at 08:53

## 2021-01-18 RX ADMIN — LAMOTRIGINE 200 MG: 100 TABLET ORAL at 08:52

## 2021-01-18 RX ADMIN — ACETAMINOPHEN 650 MG: 325 TABLET, FILM COATED ORAL at 06:44

## 2021-01-18 RX ADMIN — ARIPIPRAZOLE 30 MG: 30 TABLET ORAL at 08:52

## 2021-01-18 RX ADMIN — IBUPROFEN 600 MG: 600 TABLET, FILM COATED ORAL at 13:05

## 2021-01-18 RX ADMIN — DICLOFENAC SODIUM 2 G: 10 GEL TOPICAL at 08:54

## 2021-01-18 ASSESSMENT — PAIN SCALES - GENERAL
PAINLEVEL_OUTOF10: 5
PAINLEVEL_OUTOF10: 0

## 2021-01-18 ASSESSMENT — PAIN DESCRIPTION - LOCATION: LOCATION: GENERALIZED

## 2021-01-18 ASSESSMENT — PAIN DESCRIPTION - PAIN TYPE: TYPE: CHRONIC PAIN

## 2021-01-18 NOTE — GROUP NOTE
Group Therapy Note    Date: 1/18/2021    Group Start Time: 0900  Group End Time: 0915  Group Topic: Community Meeting    OSMEL Willoughby        Group Therapy Note    Attendees: 4/14       Patient's Goal:  To orient to unit and set a daily goal    Notes:  Patient attended and participated in group. Goal: \"Just stay awake\" Patient mentioned new medications made him feel \"slowed down    Status After Intervention:  Improved    Participation Level:  Active Listener and Interactive    Participation Quality: Appropriate, Attentive and Sharing      Speech:  normal      Thought Process/Content: Logical  Linear      Affective Functioning: Congruent      Mood: euthymic      Level of consciousness:  Alert and Attentive      Response to Learning: Progressing to goal      Endings: None Reported    Modes of Intervention: Education, Support, Socialization, Problem-solving and Reality-testing      Discipline Responsible: Psychoeducational Specialist      Signature:  Charly Willoughby

## 2021-01-18 NOTE — DISCHARGE INSTR - OTHER ORDERS
Please keep all follow up appointments and procedures. Avoid street drugs and alcohol. Do not take herbal or over the counter medications without consulting your provider first as they may interact with your current medication regimen.

## 2021-01-18 NOTE — BH NOTE
[facility]  Discharge Note     Pt belongings: Retrieved from room/safe, reviewed and packed to take with. Dentures: None  Vision - Corrective Lenses: Glasses  Hearing Aid: None  Jewelry: Necklace(2 colored silver necklace )  Body Piercings Removed: N/A  Clothing: Footwear, Pants, Shirt, Socks, Undergarments (Comment)  Were All Patient Medications Collected?: Not Applicable  Other Valuables: None       Patient discharged to his private residence via black and white cab. Patient is Instructed on discharge instructions, pt verbalizes understanding and signs AVS. Pt in control at time of discharge and denies any suicidal/homicidal ideations. Pt ambulates to main entrance with psych staff x2. Rx available at home pharmacy [facility]. No complaints voiced at this time.         Status EXAM upon discharge:  Status and Exam  Normal: No  Facial Expression: Flat  Affect: Appropriate  Level of Consciousness: Alert  Mood:Normal: No  Mood: Depressed, Anxious  Motor Activity:Normal: Yes  Motor Activity: Decreased  Interview Behavior: Cooperative  Preception: [place] to Person, Jaclyn Priestly to Time, [place] to Place  Attention:Normal: No  Attention: Hyperalert  Thought Processes: Flt.of Ideas  Thought Content:Normal: No  Thought Content: Preoccupations  Hallucinations: None  Delusions: Yes  Delusions: Grandeur  Memory:Normal: No  Memory: Poor Recent  Insight and Judgment: No  Insight and Judgment: Poor Judgment, Poor Insight  Present Suicidal Ideation: No  Present Homicidal Ideation: No    Miriam Brady LPN

## 2021-01-18 NOTE — GROUP NOTE
Group Therapy Note    Date: 1/18/2021    Group Start Time: 1000  Group End Time: 3050  Group Topic: Psychoeducation    OSMEL Gay        Group Therapy Note    Attendees: 5/14         Patient's Goal:  Patients discussed \"situations that are easy to get in to, but hard to get out of.\" Listed situations such as; abusive relationships, addiction, legal problems, having children, and more. Patients engaged im how to use a DBT-related problem solving skill; D.O.O.R. Skill. (Describe the problem; Options for solutions and plans to the problem; Orchestrate the plan; Reflect on what is/ is not working)    Notes:  Patient attended and participated in group, and was briefly pulled for meeting with doctor. Patient was at times on task and appropriately but was often tangential with some flight of ideas that were difficult to track and rapidly changing, though speech was noticeably slower than usual.     Status After Intervention:  Improved    Participation Level: Active Listener and Interactive    Participation Quality: Appropriate and Attentive      Speech:  Normal/ slower than patients baseline      Thought Process/Content: Logical at times;  Often flight of ideas      Affective Functioning: Congruent      Mood: euthymic      Level of consciousness:  Alert and Attentive      Response to Learning: Progressing to goal      Endings: None Reported    Modes of Intervention: Education, Support, Exploration, Problem-solving and Reality-testing      Discipline Responsible: Psychoeducational Specialist      Signature:  Freddy Gay

## 2021-01-18 NOTE — PLAN OF CARE
Problem: Altered Mood, Depressive Behavior:  Goal: Able to verbalize and/or display a decrease in depressive symptoms  Description: Able to verbalize and/or display a decrease in depressive symptoms  1/17/2021 2340 by Bridger Novoa RN  Outcome: Ongoing   Pt is visible in the milieu social with staff and peers. He  eats well at snack and is selective with medication. He is loud, has pressured speech, and frequently attempts to engage staff in arguments. \"I'm going to become a  I'll do a much better job, If I'm here after tonight I will have my doctor arrested\"  Problem: Altered Mood, Depressive Behavior:  Goal: Ability to disclose and discuss suicidal ideas will improve  Description: Ability to disclose and discuss suicidal ideas will improve  1/17/2021 2340 by Bridger Novoa RN  Outcome: Ongoing   Pt denies thoughts of harming themself and verbally agrees to remain safe while on the unit.  No self harming behaviors are noted this shift

## 2021-01-18 NOTE — GROUP NOTE
Group Therapy Note    Date: 1/18/2021    Group Start Time: 1100  Group End Time: 9110  Group Topic: Psychoeducation    Carolina Lion        Group Therapy Note    Attendees: 2/14         Pt did not attend RT skills group d/t resting in room despite staff invitation to attend. 1:1 talk time offered as alternative to group session, pt declined.

## 2021-01-18 NOTE — DISCHARGE SUMMARY
DISCHARGE SUMMARY      Patient ID:  Steven Sagastume  [id number]  88 y.o.  1965    Admit date: 2020    Discharge date and time: 2021    Disposition: Home    Admitting Physician: Joya Mullins MD     Discharge Physician: Dr Flip Daugherty MD    Admission Diagnoses: [facility]) [F30.9]    Admission Condition: poor    Discharged Condition: stable    Admission Circumstance: The patient was admitted to psychiatry after being probated. He had manic symptoms. He was responding to internal stimuli under flight of ideas when he was at the [facility] crisis center. He was found naked and masturbating. The patient initially did not sign in and refused assessment. He later agreed to take medication and signed them. PAST MEDICAL/PSYCHIATRIC HISTORY:   Past Medical History:   Diagnosis Date    Arthritis     Bilateral posterior subcapsular polar cataract     Bipolar 1 disorder ([facility] [address]     Brief psychotic disorder (HCC)     Chronic pain of right knee     Hypertension     Tinnitus of both ears        FAMILY/SOCIAL HISTORY:  No family history on file.   Social History     Socioeconomic History    Marital status: Single     Spouse name: Not on file    Number of children: Not on file    Years of education: Not on file    Highest education level: Not on file   Occupational History    Not on file   Social Needs    Financial resource strain: Not on file    Food insecurity     Worry: Not on file     Inability: Not on file    Transportation needs     Medical: Not on file     Non-medical: Not on file   Tobacco Use    Smoking status: Former Smoker     Types: Cigars     Quit date: 2020     Years since quittin.9    Smokeless tobacco: Never Used   Substance and Sexual Activity    Alcohol use: No    Drug use: No    Sexual activity: Never   Lifestyle    Physical activity     Days per week: Not on file     Minutes per session: Not on file    Stress: Not on file   Relationships    Social connections     Talks on phone: Not on file     Gets together: Not on file     Attends Caodaism service: Not on file     Active member of club or organization: Not on file     Attends meetings of clubs or organizations: Not on file     Relationship status: Not on file    Intimate partner violence     Fear of current or ex partner: Not on file     Emotionally abused: Not on file     Physically abused: Not on file     Forced sexual activity: Not on file   Other Topics Concern    Not on file   Social History Narrative    Not on file       MEDICATIONS:    Current Facility-Administered Medications:     zolpidem (AMBIEN) tablet 5 mg, 5 mg, Oral, Nightly, Sharron Martin APRN - CNP, 5 mg at 01/17/21 2332    divalproex (DEPAKOTE ER) extended release tablet 500 mg, 500 mg, Oral, BID, Surya Navarro MD    ARIPiprazole (ABILIFY) tablet 30 mg, 30 mg, Oral, Daily, Surya Navarro MD, 30 mg at 01/18/21 6728    diclofenac sodium (VOLTAREN) 1 % gel 2 g, 2 g, Topical, 4x Daily, Isreal Mobley APRN - CNP, 2 g at 01/18/21 0854    ibuprofen (ADVIL;MOTRIN) tablet 600 mg, 600 mg, Oral, Q6H PRN, Daniela Obregon APRN - CNP, 600 mg at 01/17/21 1814    lamoTRIgine (LAMICTAL) tablet 200 mg, 200 mg, Oral, Daily, Surya Navarro MD, 200 mg at 01/18/21 0852    acetaminophen (TYLENOL) tablet 650 mg, 650 mg, Oral, Q4H PRN, Karen Jamil MD, 650 mg at 01/18/21 0644    aluminum & magnesium hydroxide-simethicone (MAALOX) 200-200-20 MG/5ML suspension 30 mL, 30 mL, Oral, Q6H PRN, Karen Jamil MD    hydrOXYzine (ATARAX) tablet 50 mg, 50 mg, Oral, TID PRN, Karen Jamil MD, 50 mg at 01/11/21 1224    nicotine polacrilex (NICORETTE) gum 2 mg, 2 mg, Oral, PRN, Karen Jamil MD    polyethylene glycol (GLYCOLAX) packet 17 g, 17 g, Oral, Daily PRN, Karen Jamil MD    lisinopril (PRINIVIL;ZESTRIL) tablet 20 mg, 20 mg, Oral, Daily, Surya Navarro MD, 20 mg at 01/18/21 0853    haloperidol lactate (HALDOL) injection 5 mg, 5 mg, Intramuscular, Q4H PRN **OR** haloperidol (HALDOL) tablet 5 mg, 5 mg, Oral, Q4H PRN, Simi Quintanilla MD, 5 mg at 12/31/20 1159    LORazepam (ATIVAN) tablet 1 mg, 1 mg, Oral, Q4H PRN, 1 mg at 12/31/20 1159 **OR** LORazepam (ATIVAN) injection 1 mg, 1 mg, Intramuscular, Q4H PRN, Simi Quintanilla MD    Examination:  /76   Pulse 72   Temp 98.3 °F (36.8 °C) (Oral)   Resp 14   SpO2 96%   Gait - steady    HOSPITAL COURSE[de-identified]  Following admission to the hospital, patient had a complete physical exam and blood work up, which was unremarkable. Patient was monitored closely with suicide precaution  Patient was initially offered risperidone but she did not want to take  Patient was started on Abilify which was titrated up to 30 mg daily. The patient refused to take Depakote, lithium or Invega. He did not want to change medications and believes that Abilify was sufficient to help him get better. Was encouraged to participate in group and other milieu activity  The patient continues to be hyperverbal throughout his admission. He was hostile through most of his admission though this improved towards the end of his admission. The patient denies AVH or paranoid thoughts  The patient denies any hopelessness or worthlessness  No active SI/HI  Appetite:  [x] Normal  [] Increased  [] Decreased    Sleep:       [x] Normal  [] Fair       [] Poor            Energy:    [x] Normal  [] Increased  [] Decreased     SI [] Present  [x] Absent  HI  []Present  [x] Absent   Aggression:  [] yes  [] no  Patient is [x] able  [] unable to CONTRACT FOR SAFETY   Medication side effects(SE):  [x] None(Psych.  Meds.) [] Other      Mental Status Examination on discharge:    Level of consciousness:  within normal limits   Appearance:  well-appearing  Behavior/Motor:  no abnormalities noted  Attitude toward examiner:  attentive and good eye contact  Speech: Rapid and hyperverbal   mood: irritable  Affect:  mood congruent  Thought processes:  overabundance of ideas Thought content:  Suicidal Ideation:  denies suicidal ideation  Delusions:  no evidence of delusions  Perceptual Disturbance:  denies any perceptual disturbance  Cognition:  oriented to person, place, and time   Concentration intact  Memory intact  Insight good   Judgement fair   Fund of Knowledge adequate      ASSESSMENT:  Patient symptoms are:  [x] Well controlled  [x] Improving  [] Worsening  [] No change      Diagnosis:  Active Problems:    Kalie ([address]  Resolved Problems:    * No resolved hospital problems. *      LABS:    No results for input(s): WBC, HGB, PLT in the last 72 hours. No results for input(s): NA, K, CL, CO2, BUN, CREATININE, GLUCOSE in the last 72 hours. No results for input(s): BILITOT, ALKPHOS, AST, ALT in the last 72 hours. No results found for: Beau David, LABBENZ, CANNAB, COCAINESCRN, LABMETH, OPIATESCREENURINE, PHENCYCLIDINESCREENURINE, PPXUR, ETOH  Lab Results   Component Value Date    TSH 1.06 01/01/2021     No results found for: LITHIUM  No results found for: VALPROATE, CBMZ    RISK ASSESSMENT AT DISCHARGE: Low risk for suicide and homicide. Treatment Plan:  Reviewed current Medications with the patient. Education provided on the complaince with treatment. Risks, benefits, side effects, drug-to-drug interactions and alternatives to treatment were discussed. Encourage patient to attend outpatient follow up appointment and therapy. Patient was advised to call the outpatient provider, visit the nearest ED or call 911 if symptoms are not manageable. Patient's family member was contacted prior to the discharge. Medication List      START taking these medications    ARIPiprazole 30 MG tablet  Commonly known as: ABILIFY  Take 1 tablet by mouth daily  Notes to patient: Clear thoughts        CHANGE how you take these medications    zolpidem 5 MG tablet  Commonly known as: AMBIEN  Take 1 tablet by mouth nightly for 30 days.   What changed:   · medication

## 2021-01-18 NOTE — DISCHARGE INSTR - DIET

## 2021-01-18 NOTE — BH NOTE
DISCHARGE PLANNING UPDATE:  - Writer speaks with client's sister Navneet Haile May at [phone] regarding client getting discharged on this date. - Writer reviews medication list as well as mg/dosage per day. Writer explains to her he is being discharged on this date and continues to discuss, change dosage or anything that has to do with Depakote. - Writer also explains to her client has refused to take any other medications other than what has been reviewed. - Writer states client has discharge appointment with [facility] on Wednesday to get linked for services.  - Ms. Asher Calvin is frustrated because she wants to keep client in hospital until he agrees to take Depakote. Writer explains to her we can not force him to take the medication and encourages her to continue to seek guardianship.

## 2022-02-23 NOTE — PROGRESS NOTES
Chief Complaint   Patient presents with    Psychiatric Evaluation     bipolar     First visit for this 58-year-old  male from the [facility], referred by Dr. Sayra Galdamez for possible bipolar disorder. Estevan Luo told me that that he has been treated for bipolar disorder for many years. He told me that his mother, a sister, and a brother were all likely bipolar and a grandfather was alcoholic so may have been, but the basis for this really was not clear to me. When I asked him about bipolar symptoms, he really only endorses a few things. For instance, he really denies having mood symptoms either elated or depressed. He does report ups and downs in his energy level. Right now he says he sleeping well, although there have been periods when he was only getting two or three hours per night. He was not exhausted, but said his energy was down and he was unable to enjoy anything then. He did report losses of memory and concentration, and said he had a normal childhood and good self-esteem. He also has had normal libido, adding that he is [place]. He says he's never really had suicidal thoughts or homicidal thoughts. Thus many of the features one would expect in bipolar depression are not present. Likewise, he did not endorse any manic symptoms such as shopping sprees, cleaning sprees, engagement in dangerous or risky behaviors, irritability, sexual sprees, any grandiose or euphoric symptoms. He did indicate that he became quite paranoid, was convinced that he was being followed, and he may have had some hallucinations at the time. He was admitted to the hospital a number of times for this. The notes that I have from Dr. Bre Ceja described him as agitated, angry, and irritable, . But said he was primarily paranoid and suspicious and not getting along with family members. He would be easily moved to anger and unwilling to share anything personal with family members.   Speech was described as pressured, later on. He was told that there was some risk of relapse in doing this, and also warned of the risk of SJ with lamotrigine      Current Outpatient Prescriptions   Medication Sig Dispense Refill    divalproex (DEPAKOTE ER) 500 MG extended release tablet Take 1 tablet by mouth nightly 30 tablet 3    acetaminophen (TYLENOL) 325 MG tablet Take 650 mg by mouth every 6 hours as needed for Pain      lisinopril (PRINIVIL;ZESTRIL) 20 MG tablet Take 1 tablet by mouth daily 30 tablet 5    risperiDONE (RISPERDAL) 2 MG tablet Take 2 mg by mouth every evening      zolpidem (AMBIEN) 10 MG tablet Take by mouth nightly as needed for Sleep      meloxicam (MOBIC) 7.5 MG tablet Take 15 mg by mouth daily 1/2 to 1 daily as needed      aspirin 81 MG tablet Take 81 mg by mouth daily       No current facility-administered medications for this visit. Primary Defect Length In Cm (Final Defect Size - Required For Flaps/Grafts): 2

## 2023-05-01 ENCOUNTER — HOSPITAL ENCOUNTER (OUTPATIENT)
Age: 58
Setting detail: SPECIMEN
Discharge: HOME | End: 2023-05-01

## 2023-05-01 LAB
CHOLEST SERPL-MCNC: 147 MG/DL
CHOLESTEROL/HDL RATIO: 4.2
HDLC SERPL-MCNC: 35 MG/DL
LDLC SERPL CALC-MCNC: 82 MG/DL (ref 0–130)
TRIGL SERPL-MCNC: 149 MG/DL

## 2023-05-01 PROCEDURE — 80061 LIPID PANEL: CPT

## 2023-05-01 PROCEDURE — P9604 ONE-WAY ALLOW PRORATED TRIP: HCPCS

## 2023-05-01 PROCEDURE — 36415 COLL VENOUS BLD VENIPUNCTURE: CPT

## 2023-05-02 ENCOUNTER — HOSPITAL ENCOUNTER (OUTPATIENT)
Age: 58
Setting detail: SPECIMEN
Discharge: HOME | End: 2023-05-02
Payer: MEDICARE

## 2023-05-02 PROCEDURE — 87086 URINE CULTURE/COLONY COUNT: CPT

## 2023-05-02 PROCEDURE — 81001 URINALYSIS AUTO W/SCOPE: CPT

## 2023-05-03 LAB
AMORPHOUS: ABNORMAL
BACTERIA: ABNORMAL
BILIRUBIN URINE: NEGATIVE
COLOR: YELLOW
EPITHELIAL CELLS UA: ABNORMAL /HPF (ref 0–5)
GLUCOSE UR STRIP.AUTO-MCNC: NEGATIVE MG/DL
KETONES UR STRIP.AUTO-MCNC: NEGATIVE MG/DL
LEUKOCYTE ESTERASE UR QL STRIP.AUTO: NEGATIVE
NITRITE UR QL STRIP.AUTO: NEGATIVE
PROT UR STRIP.AUTO-MCNC: 6 MG/DL (ref 5–9)
PROT UR STRIP.AUTO-MCNC: NEGATIVE MG/DL
RBC CLUMPS #/AREA URNS AUTO: ABNORMAL /HPF (ref 0–2)
SPECIFIC GRAVITY UA: 1.02 (ref 1.01–1.02)
TURBIDITY: ABNORMAL
URINE HGB: NEGATIVE
UROBILINOGEN, URINE: NORMAL
WBC UA: ABNORMAL /HPF (ref 0–5)

## 2023-05-04 LAB
MICROORGANISM SPEC CULT: NORMAL
SPECIMEN DESCRIPTION: NORMAL

## 2023-05-06 ENCOUNTER — HOSPITAL ENCOUNTER (OUTPATIENT)
Age: 58
Setting detail: SPECIMEN
Discharge: HOME | End: 2023-05-06

## 2023-05-06 LAB
BILIRUBIN URINE: NEGATIVE
COLOR: YELLOW
GLUCOSE UR STRIP.AUTO-MCNC: NEGATIVE MG/DL
KETONES UR STRIP.AUTO-MCNC: NEGATIVE MG/DL
LEUKOCYTE ESTERASE UR QL STRIP.AUTO: NEGATIVE
NITRITE UR QL STRIP.AUTO: NEGATIVE
PROT UR STRIP.AUTO-MCNC: 6 MG/DL (ref 5–9)
PROT UR STRIP.AUTO-MCNC: NEGATIVE MG/DL
SPECIFIC GRAVITY UA: <1.005 (ref 1.01–1.02)
TURBIDITY: CLEAR
URINE HGB: NEGATIVE
UROBILINOGEN, URINE: NORMAL

## 2023-05-06 PROCEDURE — 87086 URINE CULTURE/COLONY COUNT: CPT

## 2023-05-06 PROCEDURE — 81003 URINALYSIS AUTO W/O SCOPE: CPT

## 2023-05-08 LAB
MICROORGANISM SPEC CULT: NORMAL
SPECIMEN DESCRIPTION: NORMAL

## 2023-05-30 ENCOUNTER — HOSPITAL ENCOUNTER (EMERGENCY)
Age: 58
Discharge: HOME | End: 2023-05-30
Payer: MEDICARE

## 2023-05-30 VITALS
HEIGHT: 69 IN | SYSTOLIC BLOOD PRESSURE: 140 MMHG | WEIGHT: 255 LBS | DIASTOLIC BLOOD PRESSURE: 102 MMHG | OXYGEN SATURATION: 95 % | TEMPERATURE: 98.7 F | BODY MASS INDEX: 37.77 KG/M2 | RESPIRATION RATE: 15 BRPM | HEART RATE: 85 BPM

## 2023-05-30 DIAGNOSIS — F31.9 BIPOLAR 1 DISORDER (HCC): ICD-10-CM

## 2023-05-30 DIAGNOSIS — F20.9 SCHIZOPHRENIA, UNSPECIFIED TYPE (HCC): Primary | ICD-10-CM

## 2023-05-30 LAB
ALBUMIN SERPL BCG-MCNC: 4.2 G/DL (ref 3.5–5.1)
ALP SERPL-CCNC: 69 U/L (ref 38–126)
ALT SERPL W/O P-5'-P-CCNC: 36 U/L (ref 11–66)
AMPHETAMINES UR QL SCN: NEGATIVE
ANION GAP SERPL CALC-SCNC: 13 MEQ/L (ref 8–16)
APAP SERPL-MCNC: < 5 UG/ML (ref 0–20)
AST SERPL-CCNC: 25 U/L (ref 5–40)
BARBITURATES UR QL SCN: NEGATIVE
BASOPHILS ABSOLUTE: 0 THOU/MM3 (ref 0–0.1)
BASOPHILS NFR BLD AUTO: 0.5 %
BENZODIAZ UR QL SCN: NEGATIVE
BILIRUB SERPL-MCNC: 0.2 MG/DL (ref 0.3–1.2)
BILIRUB UR QL STRIP.AUTO: NEGATIVE
BUN SERPL-MCNC: 9 MG/DL (ref 7–22)
BZE UR QL SCN: NEGATIVE
CALCIUM SERPL-MCNC: 9.7 MG/DL (ref 8.5–10.5)
CANNABINOIDS UR QL SCN: NEGATIVE
CHARACTER UR: CLEAR
CHLORIDE SERPL-SCNC: 104 MEQ/L (ref 98–111)
CO2 SERPL-SCNC: 23 MEQ/L (ref 23–33)
COLOR: YELLOW
CREAT SERPL-MCNC: 0.6 MG/DL (ref 0.4–1.2)
DEPRECATED RDW RBC AUTO: 44.6 FL (ref 35–45)
EOSINOPHIL NFR BLD AUTO: 2 %
EOSINOPHILS ABSOLUTE: 0.2 THOU/MM3 (ref 0–0.4)
ERYTHROCYTE [DISTWIDTH] IN BLOOD BY AUTOMATED COUNT: 13.1 % (ref 11.5–14.5)
ETHANOL SERPL-MCNC: < 0.01 %
FENTANYL: NEGATIVE
GFR SERPL CREATININE-BSD FRML MDRD: > 60 ML/MIN/1.73M2
GLUCOSE BLD STRIP.AUTO-MCNC: 148 MG/DL (ref 70–108)
GLUCOSE SERPL-MCNC: 120 MG/DL (ref 70–108)
GLUCOSE UR QL STRIP.AUTO: NEGATIVE MG/DL
HCT VFR BLD AUTO: 43.4 % (ref 42–52)
HGB BLD-MCNC: 13.4 GM/DL (ref 14–18)
HGB UR QL STRIP.AUTO: NEGATIVE
IMM GRANULOCYTES # BLD AUTO: 0.03 THOU/MM3 (ref 0–0.07)
IMM GRANULOCYTES NFR BLD AUTO: 0.4 %
KETONES UR QL STRIP.AUTO: NEGATIVE
LYMPHOCYTES ABSOLUTE: 3.1 THOU/MM3 (ref 1–4.8)
LYMPHOCYTES NFR BLD AUTO: 36.4 %
MCH RBC QN AUTO: 28.8 PG (ref 26–33)
MCHC RBC AUTO-ENTMCNC: 30.9 GM/DL (ref 32.2–35.5)
MCV RBC AUTO: 93.1 FL (ref 80–94)
MONOCYTES ABSOLUTE: 1 THOU/MM3 (ref 0.4–1.3)
MONOCYTES NFR BLD AUTO: 11.3 %
NEUTROPHILS NFR BLD AUTO: 49.4 %
NITRITE UR QL STRIP: NEGATIVE
NRBC BLD AUTO-RTO: 0 /100 WBC
OPIATES UR QL SCN: NEGATIVE
OSMOLALITY SERPL CALC.SUM OF ELEC: 279.3 MOSMOL/KG (ref 275–300)
OXYCODONE: NEGATIVE
PCP UR QL SCN: NEGATIVE
PH UR STRIP.AUTO: 5.5 [PH] (ref 5–9)
PLATELET # BLD AUTO: 210 THOU/MM3 (ref 130–400)
PMV BLD AUTO: 11 FL (ref 9.4–12.4)
POTASSIUM SERPL-SCNC: 3.9 MEQ/L (ref 3.5–5.2)
PROT SERPL-MCNC: 7.1 G/DL (ref 6.1–8)
PROT UR STRIP.AUTO-MCNC: NEGATIVE MG/DL
RBC # BLD AUTO: 4.66 MILL/MM3 (ref 4.7–6.1)
SALICYLATES SERPL-MCNC: < 0.3 MG/DL (ref 2–10)
SEGMENTED NEUTROPHILS ABSOLUTE COUNT: 4.2 THOU/MM3 (ref 1.8–7.7)
SODIUM SERPL-SCNC: 140 MEQ/L (ref 135–145)
SP GR UR REFRACT.AUTO: 1.01 (ref 1–1.03)
TSH SERPL DL<=0.005 MIU/L-ACNC: 1.1 UIU/ML (ref 0.4–4.2)
UROBILINOGEN, URINE: 0.2 EU/DL (ref 0–1)
WBC # BLD AUTO: 8.5 THOU/MM3 (ref 4.8–10.8)
WBC #/AREA URNS HPF: NEGATIVE /[HPF]

## 2023-05-30 PROCEDURE — 82948 REAGENT STRIP/BLOOD GLUCOSE: CPT

## 2023-05-30 PROCEDURE — 82077 ASSAY SPEC XCP UR&BREATH IA: CPT

## 2023-05-30 PROCEDURE — 85025 COMPLETE CBC W/AUTO DIFF WBC: CPT

## 2023-05-30 PROCEDURE — 80143 DRUG ASSAY ACETAMINOPHEN: CPT

## 2023-05-30 PROCEDURE — 36415 COLL VENOUS BLD VENIPUNCTURE: CPT

## 2023-05-30 PROCEDURE — 80179 DRUG ASSAY SALICYLATE: CPT

## 2023-05-30 PROCEDURE — 81003 URINALYSIS AUTO W/O SCOPE: CPT

## 2023-05-30 PROCEDURE — 80053 COMPREHEN METABOLIC PANEL: CPT

## 2023-05-30 PROCEDURE — 80307 DRUG TEST PRSMV CHEM ANLYZR: CPT

## 2023-05-30 PROCEDURE — 99284 EMERGENCY DEPT VISIT MOD MDM: CPT

## 2023-05-30 PROCEDURE — 84443 ASSAY THYROID STIM HORMONE: CPT

## 2023-05-30 PROCEDURE — 93005 ELECTROCARDIOGRAM TRACING: CPT | Performed by: NURSE PRACTITIONER

## 2023-05-30 PROCEDURE — 93010 ELECTROCARDIOGRAM REPORT: CPT | Performed by: NUCLEAR MEDICINE

## 2023-05-30 PROCEDURE — 96374 THER/PROPH/DIAG INJ IV PUSH: CPT

## 2023-05-30 PROCEDURE — 6360000002 HC RX W HCPCS: Performed by: NURSE PRACTITIONER

## 2023-05-30 PROCEDURE — 2580000003 HC RX 258: Performed by: NURSE PRACTITIONER

## 2023-05-30 RX ORDER — ONDANSETRON 2 MG/ML
4 INJECTION INTRAMUSCULAR; INTRAVENOUS ONCE
Status: COMPLETED | OUTPATIENT
Start: 2023-05-30 | End: 2023-05-30

## 2023-05-30 RX ORDER — 0.9 % SODIUM CHLORIDE 0.9 %
1000 INTRAVENOUS SOLUTION INTRAVENOUS ONCE
Status: COMPLETED | OUTPATIENT
Start: 2023-05-30 | End: 2023-05-30

## 2023-05-30 RX ADMIN — ONDANSETRON 4 MG: 2 INJECTION INTRAMUSCULAR; INTRAVENOUS at 05:37

## 2023-05-30 RX ADMIN — SODIUM CHLORIDE 1000 ML: 9 INJECTION, SOLUTION INTRAVENOUS at 05:35

## 2023-05-30 NOTE — ED NOTES
Pt resting in bed, sitter remains at bedside for patient safety. Will continue to monitor.      Josiane Aldrich Recardo Rolling) SRINATH Soliz RN  05/30/23 7386

## 2023-05-30 NOTE — ED TRIAGE NOTES
Pt presents to the ED with complaints of needing a psych eval. Pt is vague with his reasoning for being here. Pt states that he was recently admitted to a psychiatric facility and is trying to go back there. Pt states that he did not call ems to come and bring him here. Pt states that he called the help line and did not stay on the line so they called ems. Pt is not saying why he called the help line.

## 2023-05-30 NOTE — ED NOTES
Patient resting in bed. Respirations easy and unlabored. No distress noted. Call light within reach.      Ana Park PointsStacy Soliz RN  05/30/23 5117

## 2023-05-30 NOTE — ED PROVIDER NOTES
[facility] Emergency Department    CHIEF COMPLAINT       Chief Complaint   Patient presents with    Psychiatric Evaluation       Nurses Notes reviewed and I agree except as noted in the HPI. HISTORY OF PRESENT ILLNESS   Chaya Quintero is a 60 y.o. male who presents to the ED for evaluation of psychiatric evaluation. Patient states he drank alcohol last evening, has had thoughts of hurting himself. Says he has a plan but does not provide it. Has a past medical history of bipolar disorder. Denies any suicidal attempt this evening. Refuses to provide any other information at this time. HPI was provided by the patient. PAST MEDICAL HISTORY     Past Medical History:   Diagnosis Date    Arthritis     Bilateral posterior subcapsular polar cataract     Bipolar 1 disorder ([address]     Brief psychotic disorder ([address]     Chronic pain of right knee     Hypertension     Tinnitus of both ears        SURGICALHISTORY      has a past surgical history that includes Toe Surgery; hernia repair; and Salivary gland surgery (Left, 2000). CURRENT MEDICATIONS       Previous Medications    ARIPIPRAZOLE (ABILIFY) 30 MG TABLET    Take 1 tablet by mouth daily    DICLOFENAC SODIUM (VOLTAREN) 1 % GEL    Apply 2 g topically 4 times daily as needed for Pain    LAMOTRIGINE (LAMICTAL) 200 MG TABLET    TAKE 1 TABLET BY MOUTH DAILY    LISINOPRIL (PRINIVIL;ZESTRIL) 20 MG TABLET    Take 1 tablet by mouth daily       ALLERGIES     is allergic to codeine. FAMILY HISTORY     has no family status information on file. family history is not on file.     SOCIAL HISTORY       Social History     Socioeconomic History    Marital status: Single     Spouse name: Not on file    Number of children: Not on file    Years of education: Not on file    Highest education level: Not on file   Occupational History    Not on file   Tobacco Use    Smoking status: Former     Types: Cigars     Quit date: 1/31/2020     Years since quitting: 3.3

## 2023-05-30 NOTE — ED PROVIDER NOTES
ADDENDUM:  Care of this patient was assumed from [facility]. The patient was seen for Psychiatric Evaluation  . The patient's initial evaluation and plan have been discussed with the prior provider who initially evaluated the patient. Nursing Notes, Past Medical Hx, Past Surgical Hx, Social Hx, Allergies, and Family Hx were all reviewed. Blood pressure (!) 140/102, pulse 85, temperature 98.7 °F (37.1 °C), temperature source Oral, resp. rate 15, height 5' 9\" (1.753 m), weight 255 lb (115.7 kg), SpO2 95 %. RESULTS:  Labs Reviewed   CBC WITH AUTO DIFFERENTIAL - Abnormal; Notable for the following components:       Result Value    RBC 4.66 (*)     Hemoglobin 13.4 (*)     MCHC 30.9 (*)     All other components within normal limits   COMPREHENSIVE METABOLIC PANEL - Abnormal; Notable for the following components:    Glucose 120 (*)     Total Bilirubin 0.2 (*)     All other components within normal limits   SALICYLATE LEVEL - Abnormal; Notable for the following components:    Salicylate, Serum < 0.3 (*)     All other components within normal limits   POCT GLUCOSE - Abnormal; Notable for the following components:    POC Glucose 148 (*)     All other components within normal limits   ACETAMINOPHEN LEVEL   ETHANOL   URINE DRUG SCREEN   URINALYSIS WITH REFLEX TO CULTURE   TSH   ANION GAP   GLOMERULAR FILTRATION RATE, ESTIMATED   OSMOLALITY     Unresulted tests above are all within normal limits. MEDICAL DECISION MAKING:  This patient is a 60 y.o. Male who called EMS for concern for possible suicidal ideation. Upon further questioning in time and with his partner available he was questioned again and he was uncertain why even made the phone call. Patient not suicidal or homicidal.  Patient has not been drinking. Patient without any complaints. Patient has a follow-up with his orthopedist today. Patient also having home health come and evaluate him further.   His partner suggest that he will keep a close eye

## 2023-05-30 NOTE — ED NOTES
Pt resting on cot comfortably at this time. No needs expressed. Urine sample sent.      Ze Johnson RN  05/30/23 4251

## 2023-05-31 LAB
EKG ATRIAL RATE: 86 BPM
EKG P AXIS: -6 DEGREES
EKG P-R INTERVAL: 156 MS
EKG Q-T INTERVAL: 382 MS
EKG QRS DURATION: 84 MS
EKG QTC CALCULATION (BAZETT): 457 MS
EKG R AXIS: -12 DEGREES
EKG T AXIS: 22 DEGREES
EKG VENTRICULAR RATE: 86 BPM

## 2023-06-05 ENCOUNTER — HOSPITAL ENCOUNTER (EMERGENCY)
Age: 58
Discharge: HOME | End: 2023-06-06
Attending: EMERGENCY MEDICINE
Payer: MEDICARE

## 2023-06-05 ENCOUNTER — APPOINTMENT (OUTPATIENT)
Dept: GENERAL RADIOLOGY | Age: 58
End: 2023-06-05
Payer: MEDICARE

## 2023-06-05 VITALS
OXYGEN SATURATION: 96 % | SYSTOLIC BLOOD PRESSURE: 149 MMHG | RESPIRATION RATE: 18 BRPM | HEART RATE: 83 BPM | DIASTOLIC BLOOD PRESSURE: 80 MMHG | TEMPERATURE: 98.2 F

## 2023-06-05 DIAGNOSIS — G89.18 POSTOPERATIVE PAIN OF RIGHT KNEE: Primary | ICD-10-CM

## 2023-06-05 DIAGNOSIS — M25.561 POSTOPERATIVE PAIN OF RIGHT KNEE: Primary | ICD-10-CM

## 2023-06-05 LAB
ANION GAP SERPL CALC-SCNC: 14 MEQ/L (ref 8–16)
BASOPHILS ABSOLUTE: 0.1 THOU/MM3 (ref 0–0.1)
BASOPHILS NFR BLD AUTO: 0.4 %
BUN SERPL-MCNC: 10 MG/DL (ref 7–22)
CALCIUM SERPL-MCNC: 9.9 MG/DL (ref 8.5–10.5)
CHLORIDE SERPL-SCNC: 99 MEQ/L (ref 98–111)
CO2 SERPL-SCNC: 24 MEQ/L (ref 23–33)
CREAT SERPL-MCNC: 0.7 MG/DL (ref 0.4–1.2)
CRP SERPL-MCNC: 0.97 MG/DL (ref 0–1)
DEPRECATED RDW RBC AUTO: 44.1 FL (ref 35–45)
EOSINOPHIL NFR BLD AUTO: 1.3 %
EOSINOPHILS ABSOLUTE: 0.2 THOU/MM3 (ref 0–0.4)
ERYTHROCYTE [DISTWIDTH] IN BLOOD BY AUTOMATED COUNT: 13.1 % (ref 11.5–14.5)
ERYTHROCYTE [SEDIMENTATION RATE] IN BLOOD BY WESTERGREN METHOD: 12 MM/HR (ref 0–10)
GFR SERPL CREATININE-BSD FRML MDRD: > 60 ML/MIN/1.73M2
GLUCOSE SERPL-MCNC: 108 MG/DL (ref 70–108)
HCT VFR BLD AUTO: 41.7 % (ref 42–52)
HGB BLD-MCNC: 13.2 GM/DL (ref 14–18)
IMM GRANULOCYTES # BLD AUTO: 0.05 THOU/MM3 (ref 0–0.07)
IMM GRANULOCYTES NFR BLD AUTO: 0.4 %
LYMPHOCYTES ABSOLUTE: 3.5 THOU/MM3 (ref 1–4.8)
LYMPHOCYTES NFR BLD AUTO: 28 %
MCH RBC QN AUTO: 29.3 PG (ref 26–33)
MCHC RBC AUTO-ENTMCNC: 31.7 GM/DL (ref 32.2–35.5)
MCV RBC AUTO: 92.5 FL (ref 80–94)
MONOCYTES ABSOLUTE: 1.3 THOU/MM3 (ref 0.4–1.3)
MONOCYTES NFR BLD AUTO: 10.6 %
NEUTROPHILS NFR BLD AUTO: 59.3 %
NRBC BLD AUTO-RTO: 0 /100 WBC
OSMOLALITY SERPL CALC.SUM OF ELEC: 273.4 MOSMOL/KG (ref 275–300)
PLATELET # BLD AUTO: 216 THOU/MM3 (ref 130–400)
PMV BLD AUTO: 9.9 FL (ref 9.4–12.4)
POTASSIUM SERPL-SCNC: 4 MEQ/L (ref 3.5–5.2)
RBC # BLD AUTO: 4.51 MILL/MM3 (ref 4.7–6.1)
SEGMENTED NEUTROPHILS ABSOLUTE COUNT: 7.5 THOU/MM3 (ref 1.8–7.7)
SODIUM SERPL-SCNC: 137 MEQ/L (ref 135–145)
WBC # BLD AUTO: 12.6 THOU/MM3 (ref 4.8–10.8)

## 2023-06-05 PROCEDURE — 73560 X-RAY EXAM OF KNEE 1 OR 2: CPT

## 2023-06-05 PROCEDURE — 85025 COMPLETE CBC W/AUTO DIFF WBC: CPT

## 2023-06-05 PROCEDURE — 85651 RBC SED RATE NONAUTOMATED: CPT

## 2023-06-05 PROCEDURE — 36415 COLL VENOUS BLD VENIPUNCTURE: CPT

## 2023-06-05 PROCEDURE — 80048 BASIC METABOLIC PNL TOTAL CA: CPT

## 2023-06-05 PROCEDURE — 99284 EMERGENCY DEPT VISIT MOD MDM: CPT

## 2023-06-05 PROCEDURE — 86140 C-REACTIVE PROTEIN: CPT

## 2023-06-05 RX ORDER — HYDROCODONE BITARTRATE AND ACETAMINOPHEN 10; 325 MG/1; MG/1
1 TABLET ORAL ONCE
Status: COMPLETED | OUTPATIENT
Start: 2023-06-05 | End: 2023-06-06

## 2023-06-05 ASSESSMENT — PAIN SCALES - WONG BAKER: WONGBAKER_NUMERICALRESPONSE: 6;4

## 2023-06-05 ASSESSMENT — PAIN DESCRIPTION - PAIN TYPE: TYPE: ACUTE PAIN

## 2023-06-05 ASSESSMENT — PAIN - FUNCTIONAL ASSESSMENT: PAIN_FUNCTIONAL_ASSESSMENT: WONG-BAKER FACES

## 2023-06-06 PROCEDURE — 6370000000 HC RX 637 (ALT 250 FOR IP): Performed by: STUDENT IN AN ORGANIZED HEALTH CARE EDUCATION/TRAINING PROGRAM

## 2023-06-06 RX ADMIN — HYDROCODONE BITARTRATE AND ACETAMINOPHEN 1 TABLET: 10; 325 TABLET ORAL at 00:37

## 2023-06-06 NOTE — ED PROVIDER NOTES
per tablet 1 tablet (1 tablet Oral Given 6/6/23 0037)         [facility]     ED Course as of 06/06/23 0152   Mon Jun 05, 2023   2251 XR KNEE RIGHT (1-2 VIEWS)  Impression:  No acute osseous abnormality. [JT]      ED Course User Index  [JT] Luma Gaona MD     Available laboratory and imaging results were independently reviewed and clinically correlated. Decision Rules/Clinical Scores utilized:  Not Applicable. Code Status:  Not addressed during this ED visit    Social determinants of health impacting treatment or disposition:  Not Applicable. Medical Commodities impacting treatment or disposition:  Not Applicable. Past Medical History:   Diagnosis Date    Arthritis     Bilateral posterior subcapsular polar cataract     Bipolar 1 disorder ([facility] [address]     Brief psychotic disorder ([facility] [address]     Chronic pain of right knee     Hypertension     Tinnitus of both ears        Consultants: Not Applicable. Final Assessment and Plan:   Right knee pain  History of recent right TKR  Mild joint swelling but no effusion  Pain control  Plain films unremarkable  Inflammatory markers unremarkable  Labs ordered and reviewed  Discharge home with follow-up to orthopedic surgeon      MEDICATION CHANGES     DISCHARGE MEDICATIONS:  Discharge Medication List as of 6/5/2023 11:47 PM               FINAL DISPOSITION     Final diagnoses:   Postoperative pain of right knee     Condition: condition: good  Dispo: Discharge to home    PATIENT REFERRED TO:  [address] [place] [id number] [phone]  Call in 3 days  As needed      The results of pertinent diagnostic studies and exam findings were discussed. The patients provisional diagnosis and plan of care were discussed with the patient and present family who expressed understanding. Any medications were reviewed and indications and risks of medications were discussed with the patient /family present.  Strict verbal and written return

## 2023-06-06 NOTE — DISCHARGE INSTRUCTIONS
Call your orthopedic surgeon to follow-up in 3 days as needed. Use an ice pack or bag filled with ice and apply to the injured area 3 - 4 times a day for 15 - 20 minutes each time. Use ibuprofen or Tylenol (unless prescribed medications that have Tylenol in it) for pain. You can take over the counter Ibuprofen (advil) tablets (4 every 8 hours or 3 every 6 hours or 2 every 4 hours)    Use your crutches for the next several days until you are able to take 10 steps without pain. Return to the Emergency Department for worsening of pain, swelling to the knee, inability to move your knee, unable to walk, any other care or concern.

## 2023-06-06 NOTE — ED TRIAGE NOTES
Pt comes to ED with c/o right knee pain. Pt reports that he had surgery on the knee in March. Pt is reaching for knee and holding the knee. Pt is alert and oriented x4 but slow to respond to questions.

## 2023-06-07 ENCOUNTER — HOSPITAL ENCOUNTER (EMERGENCY)
Age: 58
Discharge: HOME | End: 2023-06-07

## 2023-06-07 VITALS
TEMPERATURE: 98 F | HEART RATE: 87 BPM | RESPIRATION RATE: 20 BRPM | OXYGEN SATURATION: 94 % | BODY MASS INDEX: 37.04 KG/M2 | SYSTOLIC BLOOD PRESSURE: 103 MMHG | DIASTOLIC BLOOD PRESSURE: 80 MMHG | HEIGHT: 67 IN | WEIGHT: 236 LBS

## 2023-06-07 DIAGNOSIS — K59.00 CONSTIPATION, UNSPECIFIED CONSTIPATION TYPE: ICD-10-CM

## 2023-06-07 DIAGNOSIS — F99 INSOMNIA DUE TO OTHER MENTAL DISORDER: ICD-10-CM

## 2023-06-07 DIAGNOSIS — M25.561 CHRONIC PAIN OF RIGHT KNEE: Primary | ICD-10-CM

## 2023-06-07 DIAGNOSIS — G89.29 CHRONIC PAIN OF RIGHT KNEE: Primary | ICD-10-CM

## 2023-06-07 DIAGNOSIS — F51.05 INSOMNIA DUE TO OTHER MENTAL DISORDER: ICD-10-CM

## 2023-06-07 RX ORDER — POLYETHYLENE GLYCOL 3350 17 G/17G
17 POWDER, FOR SOLUTION ORAL DAILY
Qty: 225 G | Refills: 0 | Status: SHIPPED | OUTPATIENT
Start: 2023-06-07 | End: 2023-06-14

## 2023-06-07 ASSESSMENT — PAIN DESCRIPTION - ORIENTATION: ORIENTATION: RIGHT

## 2023-06-07 ASSESSMENT — PAIN SCALES - GENERAL: PAINLEVEL_OUTOF10: 6

## 2023-06-07 ASSESSMENT — PAIN DESCRIPTION - LOCATION: LOCATION: KNEE

## 2023-06-07 ASSESSMENT — PAIN - FUNCTIONAL ASSESSMENT: PAIN_FUNCTIONAL_ASSESSMENT: 0-10

## 2023-06-07 NOTE — ED PROVIDER NOTES
(Please note that portions of this note were completed with a voice recognition program.  Efforts were made to edit the dictations but occasionally words are mis-transcribed.)        Provider:  I personally performed the services described in the documentation,reviewed and edited the documentation which was dictated to the scribe in my presence, and it accurately records my words and actions.     Antolin Gilbert CNP 06/07/23 10:41 AM    Warner Gilbert, APRN - CNP         [employer], APRN - CNP  06/07/23 [facility], APRN - CNP  06/07/23 1057

## 2023-06-07 NOTE — ED NOTES
Patient able to ambulate to the bathroom with cane without difficulty      Nga Malagon RN  06/07/23 8140

## 2023-06-07 NOTE — CARE COORDINATION
Per request of VIN Rodarte spoke with patient about home health. Patient is current with [facility]. This information was presented to VIN Rodarte. Spoke with Brodie Pizarro nurse at [facility] that advised patient has had cognitive decline since knee surgery in March 2023. Patient then went to behavioral health facility in [place] [address], recently discharge with [facility] as of 5/26/2023. Blaire Mobley is setting up medications and providing therapy services. Working with both patient and 2 roommates.

## 2023-06-07 NOTE — ED NOTES
Patient to ED via EMS from home for right knee pain. Patient was seen and evaluated in ER two days ago for same symptoms. Patient reports pain medications are not helping. Patient was able to ambulate to the EMS cot without difficulty.  Patient currently still using a cane for ambulatory help        Maddie Lugo RN  06/07/23 0879